# Patient Record
Sex: FEMALE | URBAN - METROPOLITAN AREA
[De-identification: names, ages, dates, MRNs, and addresses within clinical notes are randomized per-mention and may not be internally consistent; named-entity substitution may affect disease eponyms.]

---

## 2018-12-29 PROCEDURE — 84145 PROCALCITONIN (PCT): CPT

## 2018-12-30 ENCOUNTER — HOSPITAL ENCOUNTER (OUTPATIENT)
Age: 83
Discharge: HOME | End: 2019-01-01

## 2018-12-30 LAB — PROCALCITONIN: 0.53 NG/ML (ref 0–0.08)

## 2019-02-28 ENCOUNTER — HOSPITAL ENCOUNTER (OUTPATIENT)
Age: 84
Discharge: HOME | End: 2019-03-02

## 2019-02-28 LAB — PROCALCITONIN: 5.96 NG/ML (ref 0–0.08)

## 2019-02-28 PROCEDURE — 84145 PROCALCITONIN (PCT): CPT

## 2020-03-19 ENCOUNTER — HOSPITAL ENCOUNTER (OUTPATIENT)
Age: 85
Discharge: HOME | End: 2020-03-21

## 2020-03-19 PROCEDURE — 88311 DECALCIFY TISSUE: CPT

## 2020-03-19 PROCEDURE — 88307 TISSUE EXAM BY PATHOLOGIST: CPT

## 2022-04-06 ENCOUNTER — INITIAL CONSULT (OUTPATIENT)
Dept: SURGERY | Age: 87
End: 2022-04-06
Payer: COMMERCIAL

## 2022-04-06 VITALS
WEIGHT: 108 LBS | SYSTOLIC BLOOD PRESSURE: 129 MMHG | DIASTOLIC BLOOD PRESSURE: 84 MMHG | HEART RATE: 73 BPM | BODY MASS INDEX: 19.88 KG/M2 | HEIGHT: 62 IN | TEMPERATURE: 97.3 F

## 2022-04-06 DIAGNOSIS — K80.50 CHOLEDOCHOLITHIASIS: Primary | ICD-10-CM

## 2022-04-06 DIAGNOSIS — K83.8 DILATED CBD, ACQUIRED: ICD-10-CM

## 2022-04-06 PROCEDURE — 99203 OFFICE O/P NEW LOW 30 MIN: CPT | Performed by: SURGERY

## 2022-04-06 RX ORDER — A/C/E/ZINC/SOD SELENATE/COPPER 5000-60-30
1 TABLET ORAL DAILY
COMMUNITY

## 2022-04-06 RX ORDER — ACETAMINOPHEN 160 MG
TABLET,DISINTEGRATING ORAL
COMMUNITY

## 2022-04-06 RX ORDER — FLUTICASONE FUROATE AND VILANTEROL TRIFENATATE 100; 25 UG/1; UG/1
POWDER RESPIRATORY (INHALATION) DAILY
COMMUNITY

## 2022-04-06 RX ORDER — ASPIRIN 81 MG/1
81 TABLET, CHEWABLE ORAL DAILY
COMMUNITY

## 2022-04-06 RX ORDER — SIMVASTATIN 20 MG
20 TABLET ORAL NIGHTLY
COMMUNITY

## 2022-04-06 RX ORDER — CLONIDINE HYDROCHLORIDE 0.1 MG/1
0.1 TABLET ORAL 2 TIMES DAILY
COMMUNITY

## 2022-04-06 RX ORDER — OLMESARTAN MEDOXOMIL / AMLODIPINE BESYLATE / HYDROCHLOROTHIAZIDE 40; 10; 12.5 MG/1; MG/1; MG/1
TABLET, FILM COATED ORAL
COMMUNITY

## 2022-04-06 RX ORDER — POTASSIUM CHLORIDE 20 MEQ/1
20 TABLET, EXTENDED RELEASE ORAL 2 TIMES DAILY
COMMUNITY

## 2022-04-06 RX ORDER — LANSOPRAZOLE 30 MG/1
30 CAPSULE, DELAYED RELEASE ORAL DAILY
COMMUNITY

## 2022-04-08 PROBLEM — K83.8 DILATED CBD, ACQUIRED: Status: ACTIVE | Noted: 2022-04-08

## 2022-04-08 PROBLEM — K80.50 CHOLEDOCHOLITHIASIS: Status: ACTIVE | Noted: 2022-04-08

## 2022-04-08 NOTE — PROGRESS NOTES
General Surgery History and Physical  [facility]    Patient's Name/Date of Birth: Thomas Chandra / 1935    Date: April 8, 2022     Surgeon: Dianna Gandara MD    PCP: Josi Starks MD     Chief Complaint: dilated bile duct, choledocholithiasis    HPI:   Thomas Chandra is a 80 y.o. female who presents for evaluation of choledocholithiasis and dilated bile duct. She had 1 episode of RUQ pain last month. She was found to have normal liver enzymes however CTA abdomen and MRCP were performed and revealed retained CBD stone s/p lap oscar. Patient has been asymptomatic since then. She denies nausea, vomiting, constipation, diarrhea, headache, chest pain, shortness of breath, fevers, chills. There is no problem list on file for this patient. Past Medical History:   Diagnosis Date    Hypertension        History reviewed. No pertinent surgical history. No Known Allergies    The patient has a family history that is negative for severe cardiovascular or respiratory issues, negative for reaction to anesthesia. Time spent reviewing past medical, surgical, social and family history, vitals, nursing assessment and images. No changes from above documented history.     Social History     Socioeconomic History    Marital status: Unknown     Spouse name: Not on file    Number of children: Not on file    Years of education: Not on file    Highest education level: Not on file   Occupational History    Not on file   Tobacco Use    Smoking status: Former Smoker     Types: Cigarettes    Smokeless tobacco: Never Used   Substance and Sexual Activity    Alcohol use: Never    Drug use: Not on file    Sexual activity: Not on file   Other Topics Concern    Not on file   Social History Narrative    Not on file     Social Determinants of Health     Financial Resource Strain:     Difficulty of Paying Living Expenses: Not on file   Food Insecurity:     Worried About [address] in the Last Year: Not on file    Ran Out of Food in the Last Year: Not on file   Transportation Needs:     Lack of Transportation (Medical): Not on file    Lack of Transportation (Non-Medical): Not on file   Physical Activity:     Days of Exercise per Week: Not on file    Minutes of Exercise per Session: Not on file   Stress:     Feeling of Stress : Not on file   Social Connections:     Frequency of Communication with Friends and Family: Not on file    Frequency of Social Gatherings with Friends and Family: Not on file    Attends Yazidism Services: Not on file    Active Member of [address] [place] [address] or Organizations: Not on file    Attends Club or Organization Meetings: Not on file    Marital Status: Not on file   Intimate Partner Violence:     Fear of Current or Ex-Partner: Not on file    Emotionally Abused: Not on file    Physically Abused: Not on file    Sexually Abused: Not on file   Housing Stability:     Unable to Pay for Housing in the Last Year: Not on file    Number of [place] in the Last Year: Not on file    Unstable Housing in the Last Year: Not on file       I have reviewed relevant labs from this admission and interpretation is included in my assessment and plan    Review of Systems    A complete 10 system review was performed and are otherwise negative unless mentioned in the above HPI. Specific negatives are listed below but may not include all those reviewed.     General ROS: negative obtundation, AMS  ENT ROS: negative rhinorrhea, epistaxis  Allergy and Immunology ROS: negative itchy/watery eyes or nasal congestion  Hematological and Lymphatic ROS: negative spontaneous bleeding or bruising  Endocrine ROS: negative  lethargy, mood swings, palpitations or polydipsia/polyuria  Respiratory ROS: negative sputum changes, stridor, tachypnea or wheezing  Cardiovascular ROS: negative for - loss of consciousness, murmur or orthopnea  Gastrointestinal ROS: negative for - hematochezia or hematemesis  Genito-Urinary ROS: negative for -  genital discharge or hematuria  Musculoskeletal ROS: negative for - focal weakness, gangrene  Psych/Neuro ROS: negative for - visual or auditory hallucinations, suicidal ideation    Physical exam:   /84   Pulse 73   Temp 97.3 °F (36.3 °C)   Ht 5' 2\" (1.575 m)   Wt 108 lb (49 kg)   BMI 19.75 kg/m²   General appearance:  NAD, appears stated age  Head: NCAT, PERRLA, EOMI, red conjunctiva  Neck: supple, no masses, trachea midline  Lungs: Equal chest rise bilateral, no retractions, no wheezing  Heart: Reg rate  Abdomen: soft, nontender, nondistended  Skin; warm and dry, no cyanosis  Gu: no cva tenderness  Extremities: atraumatic, no focal motor deficits, no open wounds  Psych: No tremor, visual hallucinations      Radiology: I reviewed relevant abdominal imaging from this admission and that available in the EMR including CT abd/pel and MRI abdomen from 3/11/22. My assessment is choledocholithiasis    Assessment:  Carol Pulse is a 80 y.o. female with dilated CBD post cholecystectomy and choledocholithiasis, non obstructive  There is no problem list on file for this patient.         Plan:  Patient would like to hold off on ERCP at this time since she is asymptomatic and of advanced age  We discussed the signs and symptoms of biliary obstruction and I counseled her to return if she saw any of the signs  Patient and her daughter verbalized understanding  Follow-up with me as needed        Ramin Chavez MD  5:51 AM

## 2022-12-16 ENCOUNTER — OFFICE VISIT (OUTPATIENT)
Dept: SURGERY | Age: 87
End: 2022-12-16
Payer: MEDICARE

## 2022-12-16 VITALS
BODY MASS INDEX: 18.88 KG/M2 | HEART RATE: 100 BPM | SYSTOLIC BLOOD PRESSURE: 118 MMHG | DIASTOLIC BLOOD PRESSURE: 54 MMHG | OXYGEN SATURATION: 92 % | TEMPERATURE: 96.8 F | HEIGHT: 61 IN | WEIGHT: 100 LBS

## 2022-12-16 DIAGNOSIS — C44.629 SQUAMOUS CELL CANCER OF SKIN OF LEFT FOREARM: Primary | ICD-10-CM

## 2022-12-16 PROCEDURE — 99204 OFFICE O/P NEW MOD 45 MIN: CPT | Performed by: PLASTIC SURGERY

## 2022-12-16 PROCEDURE — 1123F ACP DISCUSS/DSCN MKR DOCD: CPT | Performed by: PLASTIC SURGERY

## 2022-12-16 RX ORDER — FUROSEMIDE 20 MG/1
20 TABLET ORAL DAILY
COMMUNITY

## 2022-12-16 RX ORDER — FORMOTEROL FUMARATE 20 UG/2ML
20 SOLUTION RESPIRATORY (INHALATION) 2 TIMES DAILY
COMMUNITY

## 2023-01-09 ENCOUNTER — TELEPHONE (OUTPATIENT)
Dept: SURGERY | Age: 88
End: 2023-01-09

## 2023-01-09 NOTE — TELEPHONE ENCOUNTER
Pt daughter called stating that pt tested positive for Covid on 12/25 and on 1/5 was put on Prednisone. I told daughter that since that was 2+ weeks ago we can still see her for her [facility] excision as long as she feels up to it.

## 2023-01-12 ENCOUNTER — PROCEDURE VISIT (OUTPATIENT)
Dept: SURGERY | Age: 88
End: 2023-01-12
Payer: MEDICARE

## 2023-01-12 DIAGNOSIS — C44.629 SQUAMOUS CELL CANCER OF SKIN OF LEFT FOREARM: Primary | ICD-10-CM

## 2023-01-12 PROCEDURE — 12032 INTMD RPR S/A/T/EXT 2.6-7.5: CPT | Performed by: PLASTIC SURGERY

## 2023-01-12 PROCEDURE — 11402 EXC TR-EXT B9+MARG 1.1-2 CM: CPT | Performed by: PLASTIC SURGERY

## 2023-01-12 RX ORDER — LIDOCAINE HYDROCHLORIDE AND EPINEPHRINE 10; 10 MG/ML; UG/ML
3 INJECTION, SOLUTION INFILTRATION; PERINEURAL ONCE
Status: COMPLETED | OUTPATIENT
Start: 2023-01-12 | End: 2023-01-12

## 2023-01-12 RX ADMIN — LIDOCAINE HYDROCHLORIDE AND EPINEPHRINE 3 ML: 10; 10 INJECTION, SOLUTION INFILTRATION; PERINEURAL at 12:43

## 2023-01-12 NOTE — PROGRESS NOTES
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 Denies fever, nausea, vomiting, leg pain or swelling. The patient voices understanding of the procedure they are having today and would like to proceed. Objective: There were no vitals taken for this visit. Squamous Cell Cancer of left forearm-  15mm x 15mm,  pink in color, irregular border, notraised, no signs of bleeding,drainage or infection. Non tender to palpation  Lesion- 15 mm x 15mm  Margin- 4 mm  Defect- 20 mm x 20 mm  Scar- 40 mm         Assessment:    Patient Active Problem List   Diagnosis    Choledocholithiasis    Dilated cbd, acquired       Plan:       Diagnosis  -) Squamous cell carcinoma of the left forearm      -The risks, benefits and options were discussed with the pt. The risks included but not limited to pain, bleeding, infection, heavy scarring, damage to surrounding structures, fluid collections, asymmetry, and need for further procedures. All of Her questions were answered to their satisfaction and She agrees to proceed with the procedure.    -After consent was obtained, the area was cleansed with an alcohol swab. Local anesthesia consisting of 1% lidocaine with 1:100,000 epinepherine was injected  surrounding the area. The local was allowed to work. Using a 10-blade the lesion was excised with 4 mm margins,  repair was performed. The wound(s) were closed with 4-0 monocryl for the deep dermal layer in a simple interrupted fashion and 3-0 prolene was used to close the skin via horizontal mattress sutures. Hemostasis was obtained and a bacitracin and gauze dressing was placed over the wound. The procedure was performed by Dr Jose De Jesus Herman and Hao Ellison, PGY-3. Please schedule an appointment to be seen on Follow-up with our office in 7-14 days  Diet: regular diet  Activity: no heavy lifting for 7 days. Shower/Bathing: OK to shower over the wound site in 48 hours.   No baths, hot tubs or soaking of the wound site at this time. Pt voices understanding. Wound care:   Bacitracin will need to be placed over the wound site twice daily and covered with a gauze pad. The gauze pad can be changed as needed for saturation    Medications: As prescribed  Educated to contact physician with concerns or signs of infection (redness, increasing pain, discharge, odor, fever). The entirety of the procedure was performed by Dr. Salvatore Barnes with first assistance by Allan Bautista, PGY-3    Please note that the medical decision making for the patient as well as the subjective, objective, assessment and plan were reviewed and performed by Dr Salvatore Barnes    Electronically signed by Dianne Velazco DO on 1/12/2023 at 11:39 AM      Attending Physician Statement  I have discussed the case, including pertinent history and exam findings with the resident. I have seen and examined the patient and the key elements of all parts of the encounter have been performed by me. I agree with the assessment, exam, plan and orders as documented by the resident. I attest that the patient was seen and examined by me, and concur with the documentation above. I agree with the assessment and the plan outlined. This document is generated, in part, by voice recognition software and thus  syntax and grammatical errors are possible.     Shanti Aponte

## 2023-01-25 ENCOUNTER — OFFICE VISIT (OUTPATIENT)
Dept: SURGERY | Age: 88
End: 2023-01-25

## 2023-01-25 VITALS — TEMPERATURE: 97.1 F | OXYGEN SATURATION: 88 % | HEART RATE: 78 BPM

## 2023-01-25 DIAGNOSIS — C44.629 SQUAMOUS CELL CANCER OF SKIN OF LEFT FOREARM: Primary | ICD-10-CM

## 2023-01-25 PROCEDURE — 99024 POSTOP FOLLOW-UP VISIT: CPT | Performed by: PHYSICIAN ASSISTANT

## 2023-01-25 NOTE — PROGRESS NOTES
Subjective: Follow up today from vision of left forearm squamous cell carcinoma. Denies fever, nausea, vomiting, leg pain or swelling, pain is absent. The pt states that they have  kept the wound site(s) covered with bacitracin. The patient states that they have  finished their antibiotic. They state that their pain is absent. Objective: There were no vitals taken for this visit. Left forearm wound: Clean, dry, and intact, no drainage. No signs of infection, wound healing well  Sutures intact  Neuro- Sensation  intact to carol incisional site with light touch .      Assessment:    Patient Active Problem List   Diagnosis    Choledocholithiasis    Dilated cbd, acquired       Labs: CBC: No results found for: WBC, RBC, HGB, HCT, MCV, MCH, MCHC, RDW, PLT, MPV  BMP:  No results found for: NA, K, CL, CO2, BUN, LABALBU, CREATININE, CALCIUM, GFRAA, LABGLOM, GLUCOSE, GLU      Pathology Report- [address] [facility] [address]      [address], [address] [place] [address]               FINAL SURGICAL PATHOLOGY REPORT     NAME:            Gopal Sharma           Date of       01/12/2023                                            Collection:   Medical Record   [id number]              Date of       01/13/2023   Number:                                  Receipt:   Age:  80 Y        Sex:  F                Date          01/19/2023 10:40                                            Reported:   YOB: 1935   Financial        [id number]            Admitting     JOHNNY GUEVARA   Number:                                  Physician:   Patient          Henry Whittington Steven GUEVARA   Location:                                Physician:     Accession Number:  [id number]                                          Additional Physicians:ANGEL STEWART       Diagnosis:   Skin, left forearm: Cicatrix and adjacent actinic keratosis and   incidental solar lentigo. Negative for invasive or in situ carcinoma                                               Jean Olivares M.D.                                        (Electronic Signature)     Plan:     Educated the pt on their pathology report. Pt voices understanding      Dressing -discontinue bacitracin, sutures removed today. Showering at this time -ok    No Restrictions    Pt was instructed on scar massage  Scar Care Instructions      Sunscreen Recommendations for Scars  We recommend that all patients use a sunscreen when outside but especially on new scars (that are exposed to sunlight) for a year after their procedure. The SPF should be at least 28. Follow the application directions on the bottle. Why is it so Important to Use Sunscreen on Scars? A scar is new and more fragile than the surrounding skin. If you do not use sunscreen, the scar line will react differently to the sun than the surrounding skin. If you don't use sunscreen, the scar tissue will become darker than surrounding skin. This is a hyper-pigmented scar and will remain darker than the other skin. After one year, the scar and surrounding skin should react equally to sun. Superficial Scar Massage  Scar massage desensitizes and reduces scar adhesions so skin glides freely. Rub in a circular motion on and around the scar with firm, even pressure for 5 minutes four times per day   You can start scar massage once incision is completely healed and strong enough to handle the motion (usually 10 - 14 days post operatively). You use lotion to do the scar massage to allow ease with motion over the scar and prevent friction at the area. Patient had no further questions. F/U PRN  Call office with concerns or signs of infection.     Judd Garcia   10:49 AM  1/25/2023

## 2023-07-14 ENCOUNTER — TELEPHONE (OUTPATIENT)
Dept: ADMINISTRATIVE | Age: 88
End: 2023-07-14

## 2023-07-21 ENCOUNTER — OFFICE VISIT (OUTPATIENT)
Dept: CARDIOLOGY CLINIC | Age: 88
End: 2023-07-21
Payer: MEDICARE

## 2023-07-21 VITALS
HEART RATE: 80 BPM | WEIGHT: 104.2 LBS | DIASTOLIC BLOOD PRESSURE: 58 MMHG | SYSTOLIC BLOOD PRESSURE: 138 MMHG | HEIGHT: 60 IN | RESPIRATION RATE: 16 BRPM | OXYGEN SATURATION: 96 % | BODY MASS INDEX: 20.46 KG/M2

## 2023-07-21 DIAGNOSIS — R01.1 HEART MURMUR: Primary | ICD-10-CM

## 2023-07-21 PROCEDURE — 1123F ACP DISCUSS/DSCN MKR DOCD: CPT | Performed by: INTERNAL MEDICINE

## 2023-07-21 PROCEDURE — 93000 ELECTROCARDIOGRAM COMPLETE: CPT | Performed by: INTERNAL MEDICINE

## 2023-07-21 PROCEDURE — 99203 OFFICE O/P NEW LOW 30 MIN: CPT | Performed by: INTERNAL MEDICINE

## 2023-07-21 RX ORDER — ACETAMINOPHEN AND CODEINE PHOSPHATE 300; 30 MG/1; MG/1
1 TABLET ORAL PRN
COMMUNITY
Start: 2023-07-20

## 2023-07-21 RX ORDER — DILTIAZEM HYDROCHLORIDE 60 MG/1
2 TABLET, FILM COATED ORAL 2 TIMES DAILY
COMMUNITY
Start: 2023-07-05

## 2023-07-21 ASSESSMENT — ENCOUNTER SYMPTOMS
NAUSEA: 0
DIARRHEA: 0
SHORTNESS OF BREATH: 0
WHEEZING: 0
BACK PAIN: 0
ABDOMINAL PAIN: 0
CONSTIPATION: 0
COUGH: 0
BLOOD IN STOOL: 0
VOMITING: 0

## 2023-07-21 NOTE — PROGRESS NOTES
OUTPATIENT CARDIOLOGY CONSULT    Name: Sean De    Age: 80 y.o. Date of Service: 7/21/2023    Reason for Consultation:   Chief Complaint   Patient presents with    Heart Murmur     Consult, per Dr Emily Philippe, d/t heart murmur. No cardiac complaints. Referring Physician: Abi Darby MD    History of Present Illness:  80-year-old ex-smoker female who is referred for cardiac evaluation due to heart murmur. Patient is accompanied by her daughter and her granddaughter who works in our office nuclear stress lab. 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 Patient lives alone. She takes care of her house. She can go up 12 steps once or twice a day with no chest discomfort or dyspnea on exertion. She denies palpitations, dizziness or syncope. She denies lower extremity edema. EKG done today reviewed sinus rhythm at 80 bpm, left atrial enlargement, left anterior fascicular block, low voltage frontal leads, poor R wave progression and artifacts. Review of Systems:  Review of Systems   Constitutional:  Negative for chills, fatigue and fever. HENT:  Negative for nosebleeds. Respiratory:  Negative for cough, shortness of breath and wheezing. Gastrointestinal:  Negative for abdominal pain, blood in stool, constipation, diarrhea, nausea and vomiting. GERD. Genitourinary:  Negative for dysuria and hematuria. Musculoskeletal:  Negative for back pain, joint swelling and myalgias. Aching attributed to arthritis. Neurological:  Negative for syncope and light-headedness. Psychiatric/Behavioral:  The patient is not nervous/anxious.          Past Medical History:  Past Medical History:   Diagnosis Date    Asthma     COVID 12/2022    Hearing aid worn     Hyperlipidemia     Hypertension        Past Surgical History:  Past Surgical History:

## 2023-07-28 ENCOUNTER — TELEPHONE (OUTPATIENT)
Dept: CARDIOLOGY CLINIC | Age: 88
End: 2023-07-28

## 2023-07-28 RX ORDER — CARVEDILOL 3.12 MG/1
3.12 TABLET ORAL 2 TIMES DAILY WITH MEALS
COMMUNITY
End: 2023-07-28 | Stop reason: SDUPTHER

## 2023-07-28 RX ORDER — CARVEDILOL 3.12 MG/1
3.12 TABLET ORAL 2 TIMES DAILY WITH MEALS
Qty: 180 TABLET | Refills: 3 | Status: SHIPPED | OUTPATIENT
Start: 2023-07-28

## 2023-07-28 NOTE — TELEPHONE ENCOUNTER
Patient's daughter called with patient's BP results since office visit. She says they have been quite higher than usual. She states she has taken the Clonidine but still elevated.  Some readings as follows  179/97   171/84   149/71   158/87   Please advise

## 2023-07-30 ENCOUNTER — HOSPITAL ENCOUNTER (EMERGENCY)
Age: 88
Discharge: HOME | End: 2023-07-31
Attending: STUDENT IN AN ORGANIZED HEALTH CARE EDUCATION/TRAINING PROGRAM
Payer: MEDICARE

## 2023-07-30 DIAGNOSIS — K80.50 CALCULUS OF BILE DUCT WITHOUT CHOLECYSTITIS AND WITHOUT OBSTRUCTION: ICD-10-CM

## 2023-07-30 DIAGNOSIS — R03.0 ELEVATED BLOOD PRESSURE READING: Primary | ICD-10-CM

## 2023-07-30 DIAGNOSIS — J43.9 PULMONARY EMPHYSEMA, UNSPECIFIED EMPHYSEMA TYPE (HCC): ICD-10-CM

## 2023-07-30 DIAGNOSIS — Z99.81 OXYGEN DEPENDENT: ICD-10-CM

## 2023-07-30 LAB
ALBUMIN SERPL-MCNC: 4.4 G/DL (ref 3.5–5.2)
ALP SERPL-CCNC: 168 U/L (ref 35–104)
ALT SERPL-CCNC: 21 U/L (ref 0–32)
ANION GAP SERPL CALCULATED.3IONS-SCNC: 13 MMOL/L (ref 7–16)
AST SERPL-CCNC: 28 U/L (ref 0–31)
BASOPHILS # BLD: 0.03 K/UL (ref 0–0.2)
BASOPHILS NFR BLD: 0 % (ref 0–2)
BILIRUB SERPL-MCNC: 0.4 MG/DL (ref 0–1.2)
BUN SERPL-MCNC: 18 MG/DL (ref 6–23)
CALCIUM SERPL-MCNC: 9.5 MG/DL (ref 8.6–10.2)
CHLORIDE SERPL-SCNC: 99 MMOL/L (ref 98–107)
CO2 SERPL-SCNC: 30 MMOL/L (ref 22–29)
CREAT SERPL-MCNC: 0.8 MG/DL (ref 0.5–1)
EOSINOPHIL # BLD: 0.19 K/UL (ref 0.05–0.5)
EOSINOPHILS RELATIVE PERCENT: 2 % (ref 0–6)
ERYTHROCYTE [DISTWIDTH] IN BLOOD BY AUTOMATED COUNT: 13.8 % (ref 11.5–15)
GFR SERPL CREATININE-BSD FRML MDRD: >60 ML/MIN/1.73M2
GLUCOSE SERPL-MCNC: 119 MG/DL (ref 74–99)
HCT VFR BLD AUTO: 47 % (ref 34–48)
HGB BLD-MCNC: 15.4 G/DL (ref 11.5–15.5)
IMM GRANULOCYTES # BLD AUTO: <0.03 K/UL (ref 0–0.58)
IMM GRANULOCYTES NFR BLD: 0 % (ref 0–5)
LYMPHOCYTES NFR BLD: 1.73 K/UL (ref 1.5–4)
LYMPHOCYTES RELATIVE PERCENT: 22 % (ref 20–42)
MCH RBC QN AUTO: 30.7 PG (ref 26–35)
MCHC RBC AUTO-ENTMCNC: 32.8 G/DL (ref 32–34.5)
MCV RBC AUTO: 93.6 FL (ref 80–99.9)
MONOCYTES NFR BLD: 0.72 K/UL (ref 0.1–0.95)
MONOCYTES NFR BLD: 9 % (ref 2–12)
NEUTROPHILS NFR BLD: 66 % (ref 43–80)
NEUTS SEG NFR BLD: 5.11 K/UL (ref 1.8–7.3)
PLATELET # BLD AUTO: 187 K/UL (ref 130–450)
PMV BLD AUTO: 9.9 FL (ref 7–12)
POTASSIUM SERPL-SCNC: 4 MMOL/L (ref 3.5–5)
PROT SERPL-MCNC: 7.2 G/DL (ref 6.4–8.3)
RBC # BLD AUTO: 5.02 M/UL (ref 3.5–5.5)
SODIUM SERPL-SCNC: 142 MMOL/L (ref 132–146)
TROPONIN I SERPL HS-MCNC: 13 NG/L (ref 0–9)
WBC OTHER # BLD: 7.8 K/UL (ref 4.5–11.5)

## 2023-07-30 PROCEDURE — 85027 COMPLETE CBC AUTOMATED: CPT

## 2023-07-30 PROCEDURE — 83880 ASSAY OF NATRIURETIC PEPTIDE: CPT

## 2023-07-30 PROCEDURE — 93005 ELECTROCARDIOGRAM TRACING: CPT | Performed by: STUDENT IN AN ORGANIZED HEALTH CARE EDUCATION/TRAINING PROGRAM

## 2023-07-30 PROCEDURE — 84484 ASSAY OF TROPONIN QUANT: CPT

## 2023-07-30 PROCEDURE — 80053 COMPREHEN METABOLIC PANEL: CPT

## 2023-07-30 PROCEDURE — 87636 SARSCOV2 & INF A&B AMP PRB: CPT

## 2023-07-30 PROCEDURE — 99285 EMERGENCY DEPT VISIT HI MDM: CPT

## 2023-07-30 ASSESSMENT — ENCOUNTER SYMPTOMS
NAUSEA: 0
COUGH: 0
DIARRHEA: 0
VOMITING: 0
BACK PAIN: 0
COLOR CHANGE: 0
ABDOMINAL PAIN: 0
SHORTNESS OF BREATH: 0

## 2023-07-30 ASSESSMENT — LIFESTYLE VARIABLES
HOW OFTEN DO YOU HAVE A DRINK CONTAINING ALCOHOL: NEVER
HOW MANY STANDARD DRINKS CONTAINING ALCOHOL DO YOU HAVE ON A TYPICAL DAY: PATIENT DOES NOT DRINK

## 2023-07-31 ENCOUNTER — APPOINTMENT (OUTPATIENT)
Dept: CT IMAGING | Age: 88
End: 2023-07-31
Payer: MEDICARE

## 2023-07-31 ENCOUNTER — APPOINTMENT (OUTPATIENT)
Dept: GENERAL RADIOLOGY | Age: 88
End: 2023-07-31
Payer: MEDICARE

## 2023-07-31 VITALS
TEMPERATURE: 98.6 F | RESPIRATION RATE: 16 BRPM | SYSTOLIC BLOOD PRESSURE: 172 MMHG | OXYGEN SATURATION: 93 % | DIASTOLIC BLOOD PRESSURE: 94 MMHG | HEART RATE: 91 BPM

## 2023-07-31 LAB
BNP SERPL-MCNC: 1021 PG/ML (ref 0–450)
EKG ATRIAL RATE: 103 BPM
EKG ATRIAL RATE: 93 BPM
EKG P AXIS: 57 DEGREES
EKG P AXIS: 67 DEGREES
EKG P-R INTERVAL: 186 MS
EKG P-R INTERVAL: 186 MS
EKG Q-T INTERVAL: 318 MS
EKG Q-T INTERVAL: 354 MS
EKG QRS DURATION: 66 MS
EKG QRS DURATION: 68 MS
EKG QTC CALCULATION (BAZETT): 416 MS
EKG QTC CALCULATION (BAZETT): 440 MS
EKG R AXIS: -33 DEGREES
EKG R AXIS: -38 DEGREES
EKG T AXIS: 51 DEGREES
EKG T AXIS: 59 DEGREES
EKG VENTRICULAR RATE: 103 BPM
EKG VENTRICULAR RATE: 93 BPM
FLUAV RNA RESP QL NAA+PROBE: NOT DETECTED
FLUBV RNA RESP QL NAA+PROBE: NOT DETECTED
SARS-COV-2 RNA RESP QL NAA+PROBE: NOT DETECTED
SOURCE: NORMAL
SPECIMEN DESCRIPTION: NORMAL
TROPONIN I SERPL HS-MCNC: 12 NG/L (ref 0–9)

## 2023-07-31 PROCEDURE — 71045 X-RAY EXAM CHEST 1 VIEW: CPT

## 2023-07-31 PROCEDURE — 84484 ASSAY OF TROPONIN QUANT: CPT

## 2023-07-31 PROCEDURE — 70450 CT HEAD/BRAIN W/O DYE: CPT

## 2023-07-31 PROCEDURE — 71275 CT ANGIOGRAPHY CHEST: CPT

## 2023-07-31 PROCEDURE — 6360000004 HC RX CONTRAST MEDICATION: Performed by: RADIOLOGY

## 2023-07-31 PROCEDURE — 93010 ELECTROCARDIOGRAM REPORT: CPT | Performed by: INTERNAL MEDICINE

## 2023-07-31 RX ADMIN — IOPAMIDOL 75 ML: 755 INJECTION, SOLUTION INTRAVENOUS at 02:02

## 2023-07-31 ASSESSMENT — PAIN - FUNCTIONAL ASSESSMENT: PAIN_FUNCTIONAL_ASSESSMENT: NONE - DENIES PAIN

## 2023-07-31 NOTE — DISCHARGE INSTRUCTIONS
Use your oxygen as needed. Continue taking all medications as prescribed. If anything changes if you develop chest pain shortness of breath fevers please return back to emergency department.

## 2023-07-31 NOTE — ED NOTES
Patient ambulated to restroom. Upon returning to room, SpO2 77% on RA  & patient c/o SOB . Per patient she had no SOB prior to this. Patient placed on 2 lpm NC, SpO2 increased to 98% DAYNE Schreiber notified.       Padmini [facility]  07/30/23 3246

## 2023-07-31 NOTE — ED PROVIDER NOTES
HPI   63-year-old female patient presents to emergency department for evaluation of elevated blood pressure. Patient on multiple antihypertensive medications including Coreg, olmesartan, atenolol and clonidine as needed. Patient has been compliant taking all medications as scheduled. She was recently started seeing cardiology due to history of aortic stenosis. Over the last 3 days blood pressure has been very high in the 170s and 180s. No chest pain or shortness of breath but she has been having intermittent headaches behind her eyes they are coming and going. Headache restarted again once she had arrived. Not sudden onset, not maximal intensity at time of onset. It is currently mild no associated blurred vision no numbness or weakness no slurred speech. No chest pain or shortness of breath. Review of Systems   Constitutional:  Negative for chills and fever. HENT:  Negative for congestion. Respiratory:  Negative for cough and shortness of breath. Cardiovascular:  Negative for chest pain. Gastrointestinal:  Negative for abdominal pain, diarrhea, nausea and vomiting. Genitourinary:  Negative for difficulty urinating, dysuria and hematuria. Musculoskeletal:  Negative for back pain. Skin:  Negative for color change. Neurological:  Positive for headaches. All other systems reviewed and are negative. Physical Exam  Vitals and nursing note reviewed. Constitutional:       Appearance: Normal appearance. HENT:      Head: Normocephalic and atraumatic. Nose: Nose normal. No congestion. Mouth/Throat:      Mouth: Mucous membranes are moist.      Pharynx: Oropharynx is clear. Eyes:      Conjunctiva/sclera: Conjunctivae normal.      Pupils: Pupils are equal, round, and reactive to light. Cardiovascular:      Rate and Rhythm: Normal rate and regular rhythm. Pulses: Normal pulses. Heart sounds: Murmur heard.    Pulmonary:      Effort: Pulmonary effort is normal. No

## 2023-08-16 ENCOUNTER — HOSPITAL ENCOUNTER (OUTPATIENT)
Dept: CARDIOLOGY | Age: 88
Discharge: HOME | End: 2023-08-16
Attending: INTERNAL MEDICINE
Payer: MEDICARE

## 2023-08-16 DIAGNOSIS — R01.1 HEART MURMUR: ICD-10-CM

## 2023-08-16 LAB
LV EF: 70 %
LVEF MODALITY: NORMAL

## 2023-08-16 PROCEDURE — 93306 TTE W/DOPPLER COMPLETE: CPT

## 2023-08-22 ENCOUNTER — TELEPHONE (OUTPATIENT)
Dept: CARDIOLOGY CLINIC | Age: 88
End: 2023-08-22

## 2023-08-22 NOTE — TELEPHONE ENCOUNTER
Please inform patient that her echocardiogram revealed significant amount of calcium on the mitral valve with mild mitral valve stenosis and mild aortic valve stenosis with significant leaking of the tricuspid valve but an ejection fraction of more than 70%. The stenosis of the mitral and aortic valve at her age is not uncommon.

## 2023-08-30 RX ORDER — CARVEDILOL 3.12 MG/1
6.25 TABLET ORAL 2 TIMES DAILY WITH MEALS
Qty: 180 TABLET | Refills: 3 | Status: CANCELLED | OUTPATIENT
Start: 2023-08-30

## 2023-08-31 RX ORDER — CARVEDILOL 6.25 MG/1
6.25 TABLET ORAL 2 TIMES DAILY
Qty: 180 TABLET | Refills: 3 | Status: SHIPPED | OUTPATIENT
Start: 2023-08-31

## 2024-04-18 ENCOUNTER — OFFICE VISIT (OUTPATIENT)
Dept: CARDIOLOGY CLINIC | Age: 89
End: 2024-04-18
Payer: MEDICARE

## 2024-04-18 VITALS
BODY MASS INDEX: 18.02 KG/M2 | HEIGHT: 60 IN | DIASTOLIC BLOOD PRESSURE: 62 MMHG | HEART RATE: 84 BPM | SYSTOLIC BLOOD PRESSURE: 116 MMHG | RESPIRATION RATE: 16 BRPM | WEIGHT: 91.8 LBS

## 2024-04-18 DIAGNOSIS — R01.1 HEART MURMUR: Primary | ICD-10-CM

## 2024-04-18 PROCEDURE — 93000 ELECTROCARDIOGRAM COMPLETE: CPT | Performed by: INTERNAL MEDICINE

## 2024-04-18 PROCEDURE — 99214 OFFICE O/P EST MOD 30 MIN: CPT | Performed by: INTERNAL MEDICINE

## 2024-04-18 PROCEDURE — 1123F ACP DISCUSS/DSCN MKR DOCD: CPT | Performed by: INTERNAL MEDICINE

## 2024-04-18 RX ORDER — LOSARTAN POTASSIUM 25 MG/1
25 TABLET ORAL DAILY
COMMUNITY

## 2024-04-18 RX ORDER — APIXABAN 2.5 MG/1
2.5 TABLET, FILM COATED ORAL 2 TIMES DAILY
COMMUNITY
Start: 2024-03-29

## 2024-04-18 ASSESSMENT — ENCOUNTER SYMPTOMS
ABDOMINAL PAIN: 0
BACK PAIN: 0
SHORTNESS OF BREATH: 0
BLOOD IN STOOL: 0
DIARRHEA: 0
COUGH: 0
WHEEZING: 0
NAUSEA: 0
CONSTIPATION: 0
VOMITING: 0

## 2024-04-18 NOTE — PROGRESS NOTES
the lungs in the morning and 2 puffs in the evening.      acetaminophen-codeine (TYLENOL #3) 300-30 MG per tablet Take 1 tablet by mouth as needed.      furosemide (LASIX) 20 MG tablet Take 1 tablet by mouth daily      formoterol (PERFOROMIST) 20 MCG/2ML nebulizer solution Take 20 mcg by nebulization in the morning and at bedtime (Patient not taking: Reported on 7/21/2023)      Acetaminophen (TYLENOL) 325 MG CAPS Take 2 tablets by mouth daily      simvastatin (ZOCOR) 20 MG tablet Take 1 tablet by mouth nightly      cloNIDine (CATAPRES) 0.1 MG tablet Take 1 tablet by mouth daily as needed      fluticasone-vilanterol (BREO ELLIPTA) 100-25 MCG/INH AEPB inhaler Inhale into the lungs daily (Patient not taking: Reported on 12/16/2022)      Cholecalciferol (VITAMIN D3) 50 MCG (2000 UT) CAPS Take by mouth daily      Multiple Vitamins-Minerals (PROSIGHT) TABS Take 1 tablet by mouth daily      Probiotic Product (PROBIOTIC-10 PO) Take by mouth daily      olmesartan-amLODIPine-HCTZ 40-10-12.5 MG TABS Take 20 mg by mouth daily      albuterol (PROVENTIL) (2.5 MG/3ML) 0.083% nebulizer solution Take 3 mLs by nebulization in the morning and at bedtime      atenolol (TENORMIN) 25 MG tablet Take 1 tablet by mouth daily       No current facility-administered medications for this visit.       Physical Exam:  Ht 1.524 m (5')   BMI 20.35 kg/m²   Wt Readings from Last 3 Encounters:   07/21/23 47.3 kg (104 lb 3.2 oz)   12/16/22 45.4 kg (100 lb)   04/06/22 49 kg (108 lb)       Physical Exam  Constitutional:       General: She is not in acute distress.     Appearance: She is well-developed.   HENT:      Head: Normocephalic and atraumatic.   Neck:      Vascular: No carotid bruit or JVD.   Cardiovascular:      Rate and Rhythm: Normal rate and regular rhythm.      Heart sounds: No murmur heard.     No friction rub. No gallop.      Comments: 2/6 systolic murmur heard all over the precordial area.  Pulmonary:      Breath sounds: No wheezing or

## 2024-05-06 ENCOUNTER — TELEPHONE (OUTPATIENT)
Dept: CARDIOLOGY CLINIC | Age: 89
End: 2024-05-06

## 2024-05-06 NOTE — TELEPHONE ENCOUNTER
Please inform patient daughter to increase her Lasix to 40 mg for few days then cut down back to 20 mg.  Patient is to have low-salt diet and leg elevation.  She was already referred to see Dr. Chavira due to her permanent pacemaker.

## 2024-05-06 NOTE — TELEPHONE ENCOUNTER
Dr. Tripathi,    Patients daughter is calling because swelling mid calf area down into her feet, she is also experiencing extreme hair loss?   Patient is also inquiring about a referral for Dr. Chavira?     Please Advise.

## 2024-05-13 ENCOUNTER — TELEPHONE (OUTPATIENT)
Dept: OTHER | Age: 89
End: 2024-05-13

## 2024-05-13 ENCOUNTER — TELEPHONE (OUTPATIENT)
Dept: CARDIOLOGY CLINIC | Age: 89
End: 2024-05-13

## 2024-05-13 DIAGNOSIS — R60.0 LOWER EXTREMITY EDEMA: Primary | ICD-10-CM

## 2024-05-13 RX ORDER — POTASSIUM CHLORIDE 20 MEQ/1
20 TABLET, EXTENDED RELEASE ORAL DAILY
Qty: 30 TABLET | Refills: 5 | Status: SHIPPED | OUTPATIENT
Start: 2024-05-13

## 2024-05-13 NOTE — TELEPHONE ENCOUNTER
Received consult from 's office. Called and spoke with patient's daughter Luzmaria to schedule consult appointment. Patient scheduled for May 29th and notified Kierra Rivera of patient's scheduled appointment and asked Kierra to please make sure  is ok with appointment since it is a few weeks away.

## 2024-05-13 NOTE — TELEPHONE ENCOUNTER
Please inform patient daughter that the lower extremity edema is probably due to severe leaking of the tricuspid valve and moderate elevation of the patient to her lungs.  I increase her Lasix to 40 mg twice daily for the next few days then cut down to 40 mg daily  I will send to her pharmacy potassium supplementation to take with Lasix  She will need to have her kidney function and electrolytes checked in 3 days.  I will arrange for her to be followed at the congestive heart failure clinic in a week.

## 2024-05-13 NOTE — TELEPHONE ENCOUNTER
Dr. Tripathi,    Patients daughter called about her mothers' legs still swelling. The left leg seems to swell more than the right leg, up to mid calf and leg looks shiny because of how swollen it gets. For the past week, she has been taking the 40mg of lasix and also elevated her feet as much as possible.    Please Advise.

## 2024-05-16 ENCOUNTER — HOSPITAL ENCOUNTER (OUTPATIENT)
Age: 89
Discharge: HOME | End: 2024-05-16
Payer: MEDICARE

## 2024-05-16 DIAGNOSIS — R60.0 LOWER EXTREMITY EDEMA: ICD-10-CM

## 2024-05-16 LAB
ANION GAP SERPL CALCULATED.3IONS-SCNC: 11 MMOL/L (ref 7–16)
BNP SERPL-MCNC: 482 PG/ML (ref 0–450)
BUN SERPL-MCNC: 17 MG/DL (ref 6–23)
CALCIUM SERPL-MCNC: 9.4 MG/DL (ref 8.6–10.2)
CHLORIDE SERPL-SCNC: 97 MMOL/L (ref 98–107)
CO2 SERPL-SCNC: 33 MMOL/L (ref 22–29)
CREAT SERPL-MCNC: 0.7 MG/DL (ref 0.5–1)
GFR, ESTIMATED: 77 ML/MIN/1.73M2
GLUCOSE SERPL-MCNC: 82 MG/DL (ref 74–99)
POTASSIUM SERPL-SCNC: 4 MMOL/L (ref 3.5–5)
SODIUM SERPL-SCNC: 141 MMOL/L (ref 132–146)

## 2024-05-16 PROCEDURE — 83880 ASSAY OF NATRIURETIC PEPTIDE: CPT

## 2024-05-16 PROCEDURE — 36415 COLL VENOUS BLD VENIPUNCTURE: CPT

## 2024-05-16 PROCEDURE — 80048 BASIC METABOLIC PNL TOTAL CA: CPT

## 2024-05-24 ENCOUNTER — TELEPHONE (OUTPATIENT)
Dept: CARDIOLOGY CLINIC | Age: 89
End: 2024-05-24

## 2024-05-24 NOTE — TELEPHONE ENCOUNTER
PER JASON I ASKED DR HOFFMAN IF [facility] LABS WERE OK AND IF SHE STILL NEEDED TO TAKE THE POTASSIUM.    PER DR HOFFMAN NO ISSUES WITH THE LAB AND YES SHE SHOULD REMAIN ON THE POTASSIUM.    I CALLED JASON BACK AND GAVE HER THE ABOVE INFORMATION.  RLL

## 2024-05-28 RX ORDER — APIXABAN 2.5 MG/1
2.5 TABLET, FILM COATED ORAL 2 TIMES DAILY
Qty: 60 TABLET | Refills: 2 | Status: SHIPPED | OUTPATIENT
Start: 2024-05-28

## 2024-05-30 ENCOUNTER — HOSPITAL ENCOUNTER (EMERGENCY)
Age: 89
Discharge: HOME | End: 2024-05-30
Payer: MEDICARE

## 2024-05-30 VITALS
TEMPERATURE: 97.5 F | HEART RATE: 66 BPM | SYSTOLIC BLOOD PRESSURE: 129 MMHG | OXYGEN SATURATION: 99 % | RESPIRATION RATE: 18 BRPM | DIASTOLIC BLOOD PRESSURE: 52 MMHG

## 2024-05-30 DIAGNOSIS — Z51.89 ENCOUNTER FOR POST-TRAUMATIC WOUND CHECK: Primary | ICD-10-CM

## 2024-05-30 PROCEDURE — 99284 EMERGENCY DEPT VISIT MOD MDM: CPT

## 2024-05-30 RX ORDER — DOXYCYCLINE HYCLATE 100 MG
100 TABLET ORAL 2 TIMES DAILY
Qty: 20 TABLET | Refills: 0 | Status: SHIPPED | OUTPATIENT
Start: 2024-05-30 | End: 2024-06-09

## 2024-05-30 ASSESSMENT — PAIN SCALES - GENERAL: PAINLEVEL_OUTOF10: 0

## 2024-05-30 ASSESSMENT — PAIN - FUNCTIONAL ASSESSMENT: PAIN_FUNCTIONAL_ASSESSMENT: NONE - DENIES PAIN

## 2024-05-30 NOTE — ED PROVIDER NOTES
Normal.        ED Triage Vitals [05/30/24 1056]   BP Temp Temp Source Pulse Respirations SpO2 Height Weight   (!) 129/52 97.5 °F (36.4 °C) Oral 74 18 (!) 89 % -- --         Constitutional:  Alert, development consistent with age.  HEENT:  NC/NT.  Airway patent.  Neck:  Normal ROM.  Supple.  Physical Exam  Right Lower Extremity(s): anterior lower leg              Tenderness:  mild.               Swelling: None.        Calf:  No evidence of DVT seen on physical exam..             Deformity: no deformity observed/palpated.               ROM: full range of motion.               Skin:  see wound photo below.     Neurovascular:               Motor deficit: none.               Sensory deficit: none.                Pulse deficit: none.               Capillary refill: normal.  Gait:  normal.  Lymphatics: No lymphangitis or adenopathy noted.  Neurological:  Oriented x3.  Motor functions intact.    Lab / Imaging Results   (All laboratory and radiology results have been personally reviewed by myself)  Labs:  No results found for this visit on 05/30/24.  Imaging:  All Radiology results interpreted by Radiologist unless otherwise noted.  No orders to display     ED Course / Medical Decision Making   Medications - No data to display       MDM:   Patient is well-appearing, afebrile presents to ED today for a wound check.  Approximately 1 week ago she was scratched by a dog.  Exam she has a large skin tear with what appears to be a hematoma that has spontaneously drained there is no surrounding erythema or purulent drainage.  She has no lymphatic streaking no underlying bony tenderness.  Therefore we will start doxycycline for infection prophylaxis and patient was referred to the wound care center as this likely needs debridement.  Patient and daughter advised on home wound care outpatient follow-up as well as return precautions, verbalized understanding and agreeable with plan of care.    Plan of Care/Counseling:  Kerrie Underwood

## 2024-06-03 ENCOUNTER — HOSPITAL ENCOUNTER (INPATIENT)
Age: 89
LOS: 8 days | Discharge: NURSING, REHAB OR LONG-TERM CARE | DRG: 854 | End: 2024-06-11
Attending: EMERGENCY MEDICINE | Admitting: INTERNAL MEDICINE
Payer: MEDICARE

## 2024-06-03 ENCOUNTER — APPOINTMENT (OUTPATIENT)
Dept: GENERAL RADIOLOGY | Age: 89
DRG: 854 | End: 2024-06-03
Payer: MEDICARE

## 2024-06-03 ENCOUNTER — HOSPITAL ENCOUNTER (OUTPATIENT)
Dept: WOUND CARE | Age: 89
Discharge: HOME | End: 2024-06-03
Payer: MEDICARE

## 2024-06-03 VITALS
BODY MASS INDEX: 17.58 KG/M2 | SYSTOLIC BLOOD PRESSURE: 125 MMHG | WEIGHT: 90 LBS | HEART RATE: 63 BPM | DIASTOLIC BLOOD PRESSURE: 52 MMHG | RESPIRATION RATE: 20 BRPM | TEMPERATURE: 98.1 F

## 2024-06-03 DIAGNOSIS — L03.115 CELLULITIS OF RIGHT LEG: Primary | ICD-10-CM

## 2024-06-03 DIAGNOSIS — S81.801A WOUND OF RIGHT LOWER EXTREMITY, INITIAL ENCOUNTER: Primary | ICD-10-CM

## 2024-06-03 DIAGNOSIS — L03.90 CELLULITIS, UNSPECIFIED CELLULITIS SITE: ICD-10-CM

## 2024-06-03 DIAGNOSIS — L03.115 CELLULITIS OF RIGHT LOWER EXTREMITY: ICD-10-CM

## 2024-06-03 LAB
ANION GAP SERPL CALCULATED.3IONS-SCNC: 11 MMOL/L (ref 7–16)
BASOPHILS # BLD: 0.05 K/UL (ref 0–0.2)
BASOPHILS NFR BLD: 1 % (ref 0–2)
BUN SERPL-MCNC: 17 MG/DL (ref 6–23)
CALCIUM SERPL-MCNC: 9.4 MG/DL (ref 8.6–10.2)
CHLORIDE SERPL-SCNC: 99 MMOL/L (ref 98–107)
CO2 SERPL-SCNC: 30 MMOL/L (ref 22–29)
CREAT SERPL-MCNC: 0.8 MG/DL (ref 0.5–1)
EOSINOPHIL # BLD: 0.25 K/UL (ref 0.05–0.5)
EOSINOPHILS RELATIVE PERCENT: 3 % (ref 0–6)
ERYTHROCYTE [DISTWIDTH] IN BLOOD BY AUTOMATED COUNT: 13.2 % (ref 11.5–15)
GFR, ESTIMATED: 75 ML/MIN/1.73M2
GLUCOSE SERPL-MCNC: 82 MG/DL (ref 74–99)
HCT VFR BLD AUTO: 40.4 % (ref 34–48)
HGB BLD-MCNC: 12.8 G/DL (ref 11.5–15.5)
IMM GRANULOCYTES # BLD AUTO: <0.03 K/UL (ref 0–0.58)
IMM GRANULOCYTES NFR BLD: 0 % (ref 0–5)
LYMPHOCYTES NFR BLD: 2.37 K/UL (ref 1.5–4)
LYMPHOCYTES RELATIVE PERCENT: 29 % (ref 20–42)
MCHC RBC AUTO-ENTMCNC: 31.7 G/DL (ref 32–34.5)
MCV RBC AUTO: 93.5 FL (ref 80–99.9)
MONOCYTES NFR BLD: 0.75 K/UL (ref 0.1–0.95)
MONOCYTES NFR BLD: 9 % (ref 2–12)
NEUTROPHILS NFR BLD: 58 % (ref 43–80)
NEUTS SEG NFR BLD: 4.77 K/UL (ref 1.8–7.3)
PLATELET # BLD AUTO: 231 K/UL (ref 130–450)
PMV BLD AUTO: 10.1 FL (ref 7–12)
POTASSIUM SERPL-SCNC: 3.9 MMOL/L (ref 3.5–5)
RBC # BLD AUTO: 4.32 M/UL (ref 3.5–5.5)
SODIUM SERPL-SCNC: 140 MMOL/L (ref 132–146)
WBC OTHER # BLD: 8.2 K/UL (ref 4.5–11.5)

## 2024-06-03 PROCEDURE — 99285 EMERGENCY DEPT VISIT HI MDM: CPT

## 2024-06-03 PROCEDURE — 87070 CULTURE OTHR SPECIMN AEROBIC: CPT

## 2024-06-03 PROCEDURE — 99213 OFFICE O/P EST LOW 20 MIN: CPT

## 2024-06-03 PROCEDURE — 85025 COMPLETE CBC W/AUTO DIFF WBC: CPT

## 2024-06-03 PROCEDURE — 6360000002 HC RX W HCPCS: Performed by: EMERGENCY MEDICINE

## 2024-06-03 PROCEDURE — 80048 BASIC METABOLIC PNL TOTAL CA: CPT

## 2024-06-03 PROCEDURE — 1200000000 HC SEMI PRIVATE

## 2024-06-03 PROCEDURE — 96374 THER/PROPH/DIAG INJ IV PUSH: CPT

## 2024-06-03 PROCEDURE — 73590 X-RAY EXAM OF LOWER LEG: CPT

## 2024-06-03 PROCEDURE — 2580000003 HC RX 258: Performed by: EMERGENCY MEDICINE

## 2024-06-03 PROCEDURE — 6370000000 HC RX 637 (ALT 250 FOR IP): Performed by: EMERGENCY MEDICINE

## 2024-06-03 PROCEDURE — 87205 SMEAR GRAM STAIN: CPT

## 2024-06-03 PROCEDURE — 87077 CULTURE AEROBIC IDENTIFY: CPT

## 2024-06-03 PROCEDURE — 87040 BLOOD CULTURE FOR BACTERIA: CPT

## 2024-06-03 RX ORDER — BUDESONIDE 0.25 MG/2ML
0.25 INHALANT ORAL
Status: DISCONTINUED | OUTPATIENT
Start: 2024-06-04 | End: 2024-06-11 | Stop reason: HOSPADM

## 2024-06-03 RX ORDER — BUDESONIDE AND FORMOTEROL FUMARATE DIHYDRATE 80; 4.5 UG/1; UG/1
2 AEROSOL RESPIRATORY (INHALATION)
Status: DISCONTINUED | OUTPATIENT
Start: 2024-06-03 | End: 2024-06-03 | Stop reason: CLARIF

## 2024-06-03 RX ORDER — LIDOCAINE 50 MG/G
OINTMENT TOPICAL ONCE
OUTPATIENT
Start: 2024-06-03 | End: 2024-06-03

## 2024-06-03 RX ORDER — LIDOCAINE HYDROCHLORIDE 40 MG/ML
SOLUTION TOPICAL ONCE
OUTPATIENT
Start: 2024-06-03 | End: 2024-06-03

## 2024-06-03 RX ORDER — POLYETHYLENE GLYCOL 3350 17 G/17G
17 POWDER, FOR SOLUTION ORAL DAILY PRN
Status: DISCONTINUED | OUTPATIENT
Start: 2024-06-03 | End: 2024-06-11 | Stop reason: HOSPADM

## 2024-06-03 RX ORDER — IBUPROFEN 200 MG
TABLET ORAL ONCE
OUTPATIENT
Start: 2024-06-03 | End: 2024-06-03

## 2024-06-03 RX ORDER — TRIAMCINOLONE ACETONIDE 1 MG/G
OINTMENT TOPICAL ONCE
OUTPATIENT
Start: 2024-06-03 | End: 2024-06-03

## 2024-06-03 RX ORDER — SODIUM CHLOR/HYPOCHLOROUS ACID 0.033 %
SOLUTION, IRRIGATION IRRIGATION ONCE
OUTPATIENT
Start: 2024-06-03 | End: 2024-06-03

## 2024-06-03 RX ORDER — LIDOCAINE HYDROCHLORIDE 20 MG/ML
JELLY TOPICAL ONCE
OUTPATIENT
Start: 2024-06-03 | End: 2024-06-03

## 2024-06-03 RX ORDER — GLUCAGON 1 MG/ML
1 KIT INJECTION PRN
Status: DISCONTINUED | OUTPATIENT
Start: 2024-06-03 | End: 2024-06-04

## 2024-06-03 RX ORDER — POTASSIUM CHLORIDE 7.45 MG/ML
10 INJECTION INTRAVENOUS PRN
Status: DISCONTINUED | OUTPATIENT
Start: 2024-06-03 | End: 2024-06-11 | Stop reason: HOSPADM

## 2024-06-03 RX ORDER — ENOXAPARIN SODIUM 100 MG/ML
40 INJECTION SUBCUTANEOUS DAILY
Status: DISCONTINUED | OUTPATIENT
Start: 2024-06-04 | End: 2024-06-03

## 2024-06-03 RX ORDER — CLOBETASOL PROPIONATE 0.5 MG/G
OINTMENT TOPICAL ONCE
OUTPATIENT
Start: 2024-06-03 | End: 2024-06-03

## 2024-06-03 RX ORDER — GENTAMICIN SULFATE 1 MG/G
OINTMENT TOPICAL ONCE
OUTPATIENT
Start: 2024-06-03 | End: 2024-06-03

## 2024-06-03 RX ORDER — LIDOCAINE 40 MG/G
CREAM TOPICAL ONCE
OUTPATIENT
Start: 2024-06-03 | End: 2024-06-03

## 2024-06-03 RX ORDER — GINSENG 100 MG
CAPSULE ORAL ONCE
OUTPATIENT
Start: 2024-06-03 | End: 2024-06-03

## 2024-06-03 RX ORDER — METOPROLOL TARTRATE 50 MG/1
50 TABLET, FILM COATED ORAL 2 TIMES DAILY PRN
Status: DISCONTINUED | OUTPATIENT
Start: 2024-06-03 | End: 2024-06-04

## 2024-06-03 RX ORDER — METOPROLOL TARTRATE 50 MG/1
50 TABLET, FILM COATED ORAL 2 TIMES DAILY PRN
Status: ON HOLD | COMMUNITY

## 2024-06-03 RX ORDER — DEXTROSE MONOHYDRATE 100 MG/ML
INJECTION, SOLUTION INTRAVENOUS CONTINUOUS PRN
Status: DISCONTINUED | OUTPATIENT
Start: 2024-06-03 | End: 2024-06-04

## 2024-06-03 RX ORDER — DOCUSATE SODIUM 100 MG/1
100 CAPSULE, LIQUID FILLED ORAL NIGHTLY
Status: ON HOLD | COMMUNITY

## 2024-06-03 RX ORDER — BETAMETHASONE DIPROPIONATE 0.5 MG/G
CREAM TOPICAL ONCE
OUTPATIENT
Start: 2024-06-03 | End: 2024-06-03

## 2024-06-03 RX ORDER — ACETAMINOPHEN AND CODEINE PHOSPHATE 300; 30 MG/1; MG/1
1 TABLET ORAL ONCE
Status: COMPLETED | OUTPATIENT
Start: 2024-06-03 | End: 2024-06-03

## 2024-06-03 RX ORDER — ARFORMOTEROL TARTRATE 15 UG/2ML
15 SOLUTION RESPIRATORY (INHALATION)
Status: DISCONTINUED | OUTPATIENT
Start: 2024-06-04 | End: 2024-06-11 | Stop reason: HOSPADM

## 2024-06-03 RX ORDER — SODIUM CHLORIDE 0.9 % (FLUSH) 0.9 %
5-40 SYRINGE (ML) INJECTION EVERY 12 HOURS SCHEDULED
Status: DISCONTINUED | OUTPATIENT
Start: 2024-06-03 | End: 2024-06-11 | Stop reason: HOSPADM

## 2024-06-03 RX ORDER — POTASSIUM CHLORIDE 20 MEQ/1
40 TABLET, EXTENDED RELEASE ORAL PRN
Status: DISCONTINUED | OUTPATIENT
Start: 2024-06-03 | End: 2024-06-11 | Stop reason: HOSPADM

## 2024-06-03 RX ORDER — SODIUM CHLORIDE 0.9 % (FLUSH) 0.9 %
5-40 SYRINGE (ML) INJECTION PRN
Status: DISCONTINUED | OUTPATIENT
Start: 2024-06-03 | End: 2024-06-11 | Stop reason: HOSPADM

## 2024-06-03 RX ORDER — ACETAMINOPHEN 650 MG/1
650 SUPPOSITORY RECTAL EVERY 6 HOURS PRN
Status: DISCONTINUED | OUTPATIENT
Start: 2024-06-03 | End: 2024-06-11 | Stop reason: HOSPADM

## 2024-06-03 RX ORDER — ACETAMINOPHEN 325 MG/1
650 TABLET ORAL EVERY 6 HOURS PRN
Status: DISCONTINUED | OUTPATIENT
Start: 2024-06-03 | End: 2024-06-11 | Stop reason: HOSPADM

## 2024-06-03 RX ORDER — SODIUM CHLORIDE 9 MG/ML
INJECTION, SOLUTION INTRAVENOUS PRN
Status: DISCONTINUED | OUTPATIENT
Start: 2024-06-03 | End: 2024-06-11 | Stop reason: HOSPADM

## 2024-06-03 RX ORDER — LIDOCAINE HYDROCHLORIDE 40 MG/ML
SOLUTION TOPICAL ONCE
Status: COMPLETED | OUTPATIENT
Start: 2024-06-03 | End: 2024-06-03

## 2024-06-03 RX ORDER — MAGNESIUM SULFATE IN WATER 40 MG/ML
2000 INJECTION, SOLUTION INTRAVENOUS PRN
Status: DISCONTINUED | OUTPATIENT
Start: 2024-06-03 | End: 2024-06-11 | Stop reason: HOSPADM

## 2024-06-03 RX ORDER — BACITRACIN ZINC AND POLYMYXIN B SULFATE 500; 1000 [USP'U]/G; [USP'U]/G
OINTMENT TOPICAL ONCE
OUTPATIENT
Start: 2024-06-03 | End: 2024-06-03

## 2024-06-03 RX ADMIN — LIDOCAINE HYDROCHLORIDE 10 ML: 40 SOLUTION TOPICAL at 15:28

## 2024-06-03 RX ADMIN — WATER 1000 MG: 1 INJECTION INTRAMUSCULAR; INTRAVENOUS; SUBCUTANEOUS at 18:54

## 2024-06-03 RX ADMIN — ACETAMINOPHEN AND CODEINE PHOSPHATE 1 TABLET: 300; 30 TABLET ORAL at 20:59

## 2024-06-03 ASSESSMENT — PAIN DESCRIPTION - ORIENTATION
ORIENTATION: RIGHT
ORIENTATION: RIGHT

## 2024-06-03 ASSESSMENT — PAIN SCALES - GENERAL
PAINLEVEL_OUTOF10: 5
PAINLEVEL_OUTOF10: 6

## 2024-06-03 ASSESSMENT — PAIN DESCRIPTION - LOCATION
LOCATION: LEG;GENERALIZED
LOCATION: LEG

## 2024-06-03 ASSESSMENT — PAIN DESCRIPTION - DESCRIPTORS
DESCRIPTORS: ACHING
DESCRIPTORS: THROBBING

## 2024-06-03 NOTE — ED PROVIDER NOTES
[facility] EMERGENCY DEPARTMENT  EMERGENCY DEPARTMENT ENCOUNTER        Pt Name: Shauna Gaines  MRN: [id number]  Birthdate 1935  Date of evaluation: 6/3/2024  Provider: Ross Ellison DO  PCP: Ross Mercer MD  Note Started: 5:31 PM EDT 6/3/24    CHIEF COMPLAINT       Chief Complaint   Patient presents with    Wound Check     Sent from admission by wound care clinic       HISTORY OF PRESENT ILLNESS: 1 or more Elements   History From: patient    Limitations to history : None    Shauna Gaines is a 89 y.o. female who presents to the ED for evaluation of a wound on her right lower extremity.  She states the dog had brushed up against her and caused a skin tear approximately week ago.  She states that she was not bitten by the dog.  She was started on antibiotics and has been following with the outpatient wound clinic.  She was seen today for the wound not progressing.  Was advised to come here for further treatment including IV antibiotics.  Patient denies any fevers or chills.  She has noted increased pain as well as swelling and increased redness around the wound.  Patient states she has been taking antibiotics as prescribed.    Nursing Notes were all reviewed and agreed with or any disagreements were addressed in the HPI.      REVIEW OF EXTERNAL NOTE :       Chart review shows that patient was seen at the wound clinic today by Dr. Dexter.  Patient has a wound on right lower extremity that has been present for a week.  Treated with outpatient antibiotics.  Patient was seen for wound not healing.  Patient currently on doxycycline    REVIEW OF SYSTEMS :           Positives and Pertinent negatives as per HPI.     SURGICAL HISTORY     Past Surgical History:   Procedure Laterality Date    APPENDECTOMY      BACK SURGERY      x3    CHOLECYSTECTOMY      HERNIA REPAIR  2021    HIP ARTHROPLASTY Bilateral     PACEMAKER INSERTION  03/18/2024    TONSILLECTOMY         CURRENTMEDICATIONS

## 2024-06-03 NOTE — PROGRESS NOTES
Wound Healing Center  History and Physical/Consultation  Podiatry    Referring Physician : Ross Mercer MD  Shauna Gaines  MEDICAL RECORD NUMBER:  [id number]  AGE: 89 y.o.   GENDER: female  : 1935  EPISODE DATE:  6/3/2024  Subjective:     Chief Complaint   Patient presents with    Wound Check     Right leg         HISTORY of PRESENT ILLNESS HPI     Shauna Gaines is a 89 y.o. female who presents today for wound/ulcer evaluation.   History of Wound Context:  The patient has had a wound of right leg which was first noted approximately >1 week.  This has been treated with PO abx. On their initial visit to the wound healing center, 24 ,  the patient has noted that the wound has not been improving.  The patient has not had similar previous wounds in the past.      Pt is on abx at time of initial visit.      Wound/Ulcer Pain Timing/Severity: constant  Quality of pain: sharp  Severity:  8 / 10   Modifying Factors: Pain worsens with walking  Associated Signs/Symptoms: erythema    Ulcer Identification:  Ulcer Type: skin tear  Contributing Factors: shear force    Diabetic/Pressure/Non Pressure Ulcers onl y:  Ulcer: Non-Pressure ulcer, muscle necrosis    If patient has diabetic lower extremity wounds  Pulido Classification of diabetic lower extremity wounds:    Grade Description   []  0 No open wound   []  1 Superficial ulcer involving the full skin thickness   [x]  2 Deep ulcer involves ligament, tendon, joint capsule, or fascia  No bone involvement or abscess presence   []  3 Deep Ulcer with abcess formation and/or osteomyelitis   []  4 Localized gangrene   []  5 Extensive gangrene of the foot     Wound: Open bite        PAST MEDICAL HISTORY      Diagnosis Date    Aortic valve stenosis     Asthma     COPD (chronic obstructive pulmonary disease) ([facility])     COVID 2022    Hearing aid worn     Heart murmur     Hyperlipidemia     Hypertension      Past Surgical History:   Procedure Laterality Date

## 2024-06-03 NOTE — PLAN OF CARE
Problem: Pain  Goal: Verbalizes/displays adequate comfort level or baseline comfort level  Outcome: Progressing     Problem: ABCDS Injury Assessment  Goal: Absence of physical injury  Outcome: Progressing     Problem: Cognitive:  Goal: Knowledge of wound care  Description: Knowledge of wound care  Outcome: Progressing  Goal: Understands risk factors for wounds  Description: Understands risk factors for wounds  Outcome: Progressing     Problem: Wound:  Goal: Will show signs of wound healing; wound closure and no evidence of infection  Description: Will show signs of wound healing; wound closure and no evidence of infection  Outcome: Progressing     Problem: Venous:  Goal: Signs of wound healing will improve  Description: Signs of wound healing will improve  Outcome: Progressing

## 2024-06-03 NOTE — DISCHARGE INSTRUCTIONS
Visit Discharge/Physician Orders    Discharge condition: Stable    Assessment of pain at discharge: moderate    Anesthetic used: 4% lidocaine    Discharge to: Home    Left via:Private automobile    Accompanied by: family    ECF/HHA:     Dressing Orders: Right leg: cleanse wound with normal saline, apply wet to dry dressing    Treatment Orders:    [facility] followup visit _____to ER for admission________________________  (Please note your next appointment above and if you are unable to keep, kindly give a 24 hour notice. Thank you.)    Physician signature:__________________________      If you experience any of the following, please call the Wound Care Center during business hours:    * Increase in Pain  * Temperature over 101  * Increase in drainage from your wound  * Drainage with a foul odor  * Bleeding  * Increase in swelling  * Need for compression bandage changes due to slippage, breakthrough drainage.    If you need medical attention outside of the business hours of the Wound Care Centers please contact your PCP or go to the nearest emergency room.

## 2024-06-04 ENCOUNTER — ANESTHESIA (OUTPATIENT)
Dept: OPERATING ROOM | Age: 89
End: 2024-06-04
Payer: MEDICARE

## 2024-06-04 ENCOUNTER — ANESTHESIA EVENT (OUTPATIENT)
Dept: OPERATING ROOM | Age: 89
End: 2024-06-04
Payer: MEDICARE

## 2024-06-04 PROBLEM — S81.801A WOUND OF RIGHT LEG: Status: ACTIVE | Noted: 2024-06-04

## 2024-06-04 LAB
ALBUMIN SERPL-MCNC: 3.7 G/DL (ref 3.5–5.2)
ALP SERPL-CCNC: 100 U/L (ref 35–104)
ALT SERPL-CCNC: 7 U/L (ref 0–32)
ANION GAP SERPL CALCULATED.3IONS-SCNC: 10 MMOL/L (ref 7–16)
ASO AB SERPL-ACNC: <20 IU/ML (ref 0–200)
AST SERPL-CCNC: 20 U/L (ref 0–31)
BILIRUB SERPL-MCNC: 0.3 MG/DL (ref 0–1.2)
BUN SERPL-MCNC: 16 MG/DL (ref 6–23)
CALCIUM SERPL-MCNC: 9.4 MG/DL (ref 8.6–10.2)
CHLORIDE SERPL-SCNC: 103 MMOL/L (ref 98–107)
CO2 SERPL-SCNC: 29 MMOL/L (ref 22–29)
CREAT SERPL-MCNC: 0.7 MG/DL (ref 0.5–1)
CRP SERPL HS-MCNC: 15 MG/L (ref 0–5)
ERYTHROCYTE [SEDIMENTATION RATE] IN BLOOD BY WESTERGREN METHOD: 19 MM/HR (ref 0–20)
GFR, ESTIMATED: 83 ML/MIN/1.73M2
GLUCOSE SERPL-MCNC: 82 MG/DL (ref 74–99)
INR PPP: 1.5
MAGNESIUM SERPL-MCNC: 1.7 MG/DL (ref 1.6–2.6)
PHOSPHATE SERPL-MCNC: 3.5 MG/DL (ref 2.5–4.5)
POTASSIUM SERPL-SCNC: 3.9 MMOL/L (ref 3.5–5)
PROCALCITONIN SERPL-MCNC: 0.03 NG/ML (ref 0–0.08)
PROT SERPL-MCNC: 6.8 G/DL (ref 6.4–8.3)
PROTHROMBIN TIME: 16.7 SEC (ref 9.3–12.4)
SODIUM SERPL-SCNC: 142 MMOL/L (ref 132–146)
T4 FREE SERPL-MCNC: 1.2 NG/DL (ref 0.9–1.7)
TSH SERPL DL<=0.05 MIU/L-ACNC: 1.02 UIU/ML (ref 0.27–4.2)

## 2024-06-04 PROCEDURE — 86063 ANTISTREPTOLYSIN O SCREEN: CPT

## 2024-06-04 PROCEDURE — 84439 ASSAY OF FREE THYROXINE: CPT

## 2024-06-04 PROCEDURE — 6360000002 HC RX W HCPCS: Performed by: INTERNAL MEDICINE

## 2024-06-04 PROCEDURE — 93005 ELECTROCARDIOGRAM TRACING: CPT

## 2024-06-04 PROCEDURE — 36415 COLL VENOUS BLD VENIPUNCTURE: CPT

## 2024-06-04 PROCEDURE — 86140 C-REACTIVE PROTEIN: CPT

## 2024-06-04 PROCEDURE — 2580000003 HC RX 258: Performed by: INTERNAL MEDICINE

## 2024-06-04 PROCEDURE — 6370000000 HC RX 637 (ALT 250 FOR IP): Performed by: INTERNAL MEDICINE

## 2024-06-04 PROCEDURE — 80053 COMPREHEN METABOLIC PANEL: CPT

## 2024-06-04 PROCEDURE — 84145 PROCALCITONIN (PCT): CPT

## 2024-06-04 PROCEDURE — 94640 AIRWAY INHALATION TREATMENT: CPT

## 2024-06-04 PROCEDURE — 6360000002 HC RX W HCPCS: Performed by: NURSE PRACTITIONER

## 2024-06-04 PROCEDURE — 85652 RBC SED RATE AUTOMATED: CPT

## 2024-06-04 PROCEDURE — 99223 1ST HOSP IP/OBS HIGH 75: CPT | Performed by: INTERNAL MEDICINE

## 2024-06-04 PROCEDURE — 6370000000 HC RX 637 (ALT 250 FOR IP): Performed by: NURSE PRACTITIONER

## 2024-06-04 PROCEDURE — 84100 ASSAY OF PHOSPHORUS: CPT

## 2024-06-04 PROCEDURE — 83735 ASSAY OF MAGNESIUM: CPT

## 2024-06-04 PROCEDURE — APPSS60 APP SPLIT SHARED TIME 46-60 MINUTES

## 2024-06-04 PROCEDURE — 84443 ASSAY THYROID STIM HORMONE: CPT

## 2024-06-04 PROCEDURE — 97161 PT EVAL LOW COMPLEX 20 MIN: CPT | Performed by: PHYSICAL THERAPIST

## 2024-06-04 PROCEDURE — 85610 PROTHROMBIN TIME: CPT

## 2024-06-04 PROCEDURE — 2580000003 HC RX 258: Performed by: NURSE PRACTITIONER

## 2024-06-04 PROCEDURE — 1200000000 HC SEMI PRIVATE

## 2024-06-04 RX ORDER — ENOXAPARIN SODIUM 100 MG/ML
40 INJECTION SUBCUTANEOUS DAILY
Status: DISCONTINUED | OUTPATIENT
Start: 2024-06-04 | End: 2024-06-04 | Stop reason: DRUGHIGH

## 2024-06-04 RX ORDER — FUROSEMIDE 40 MG/1
40 TABLET ORAL DAILY
Status: DISCONTINUED | OUTPATIENT
Start: 2024-06-04 | End: 2024-06-11 | Stop reason: HOSPADM

## 2024-06-04 RX ORDER — ATORVASTATIN CALCIUM 10 MG/1
10 TABLET, FILM COATED ORAL DAILY
Status: DISCONTINUED | OUTPATIENT
Start: 2024-06-04 | End: 2024-06-11 | Stop reason: HOSPADM

## 2024-06-04 RX ORDER — ALBUTEROL SULFATE 2.5 MG/3ML
2.5 SOLUTION RESPIRATORY (INHALATION)
Status: DISCONTINUED | OUTPATIENT
Start: 2024-06-04 | End: 2024-06-04

## 2024-06-04 RX ORDER — HYDROCODONE BITARTRATE AND ACETAMINOPHEN 5; 325 MG/1; MG/1
1 TABLET ORAL EVERY 6 HOURS PRN
Status: DISCONTINUED | OUTPATIENT
Start: 2024-06-04 | End: 2024-06-06

## 2024-06-04 RX ORDER — ALBUTEROL SULFATE 2.5 MG/3ML
2.5 SOLUTION RESPIRATORY (INHALATION) ONCE
Status: COMPLETED | OUTPATIENT
Start: 2024-06-04 | End: 2024-06-04

## 2024-06-04 RX ORDER — METOPROLOL TARTRATE 50 MG/1
50 TABLET, FILM COATED ORAL 2 TIMES DAILY
Status: DISCONTINUED | OUTPATIENT
Start: 2024-06-04 | End: 2024-06-11 | Stop reason: HOSPADM

## 2024-06-04 RX ORDER — POTASSIUM CHLORIDE 20 MEQ/1
20 TABLET, EXTENDED RELEASE ORAL DAILY
Status: DISCONTINUED | OUTPATIENT
Start: 2024-06-04 | End: 2024-06-04

## 2024-06-04 RX ORDER — DOCUSATE SODIUM 100 MG/1
100 CAPSULE, LIQUID FILLED ORAL NIGHTLY
Status: DISCONTINUED | OUTPATIENT
Start: 2024-06-04 | End: 2024-06-11 | Stop reason: HOSPADM

## 2024-06-04 RX ORDER — SODIUM CHLORIDE, SODIUM LACTATE, POTASSIUM CHLORIDE, CALCIUM CHLORIDE 600; 310; 30; 20 MG/100ML; MG/100ML; MG/100ML; MG/100ML
INJECTION, SOLUTION INTRAVENOUS CONTINUOUS
Status: ACTIVE | OUTPATIENT
Start: 2024-06-04 | End: 2024-06-06

## 2024-06-04 RX ORDER — ENOXAPARIN SODIUM 100 MG/ML
30 INJECTION SUBCUTANEOUS DAILY
Status: DISCONTINUED | OUTPATIENT
Start: 2024-06-04 | End: 2024-06-06

## 2024-06-04 RX ORDER — HYDRALAZINE HYDROCHLORIDE 20 MG/ML
5 INJECTION INTRAMUSCULAR; INTRAVENOUS EVERY 4 HOURS PRN
Status: DISCONTINUED | OUTPATIENT
Start: 2024-06-04 | End: 2024-06-11 | Stop reason: HOSPADM

## 2024-06-04 RX ADMIN — BUDESONIDE 250 MCG: 0.25 SUSPENSION RESPIRATORY (INHALATION) at 16:30

## 2024-06-04 RX ADMIN — VANCOMYCIN HYDROCHLORIDE 750 MG: 5 INJECTION, POWDER, LYOPHILIZED, FOR SOLUTION INTRAVENOUS at 12:03

## 2024-06-04 RX ADMIN — WATER 1000 MG: 1 INJECTION INTRAMUSCULAR; INTRAVENOUS; SUBCUTANEOUS at 20:42

## 2024-06-04 RX ADMIN — METOPROLOL TARTRATE 50 MG: 50 TABLET, FILM COATED ORAL at 10:01

## 2024-06-04 RX ADMIN — DOCUSATE SODIUM 100 MG: 100 CAPSULE, LIQUID FILLED ORAL at 20:43

## 2024-06-04 RX ADMIN — ALBUTEROL SULFATE 2.5 MG: 2.5 SOLUTION RESPIRATORY (INHALATION) at 04:22

## 2024-06-04 RX ADMIN — ARFORMOTEROL TARTRATE 15 MCG: 15 SOLUTION RESPIRATORY (INHALATION) at 04:22

## 2024-06-04 RX ADMIN — ALBUTEROL SULFATE 2.5 MG: 2.5 SOLUTION RESPIRATORY (INHALATION) at 08:42

## 2024-06-04 RX ADMIN — WATER 1000 MG: 1 INJECTION INTRAMUSCULAR; INTRAVENOUS; SUBCUTANEOUS at 10:01

## 2024-06-04 RX ADMIN — BUDESONIDE 250 MCG: 0.25 SUSPENSION RESPIRATORY (INHALATION) at 04:22

## 2024-06-04 RX ADMIN — SODIUM CHLORIDE, POTASSIUM CHLORIDE, SODIUM LACTATE AND CALCIUM CHLORIDE: 600; 310; 30; 20 INJECTION, SOLUTION INTRAVENOUS at 12:03

## 2024-06-04 RX ADMIN — ENOXAPARIN SODIUM 30 MG: 100 INJECTION SUBCUTANEOUS at 10:01

## 2024-06-04 RX ADMIN — Medication 10 ML: at 10:04

## 2024-06-04 RX ADMIN — HYDROCODONE BITARTRATE AND ACETAMINOPHEN 1 TABLET: 5; 325 TABLET ORAL at 10:01

## 2024-06-04 RX ADMIN — ATORVASTATIN CALCIUM 10 MG: 10 TABLET, FILM COATED ORAL at 10:01

## 2024-06-04 RX ADMIN — HYDROCODONE BITARTRATE AND ACETAMINOPHEN 1 TABLET: 5; 325 TABLET ORAL at 20:42

## 2024-06-04 RX ADMIN — ARFORMOTEROL TARTRATE 15 MCG: 15 SOLUTION RESPIRATORY (INHALATION) at 16:29

## 2024-06-04 RX ADMIN — Medication 10 ML: at 20:46

## 2024-06-04 ASSESSMENT — PAIN DESCRIPTION - ORIENTATION
ORIENTATION: RIGHT
ORIENTATION: RIGHT

## 2024-06-04 ASSESSMENT — PAIN DESCRIPTION - DESCRIPTORS
DESCRIPTORS: THROBBING
DESCRIPTORS: THROBBING

## 2024-06-04 ASSESSMENT — PAIN DESCRIPTION - LOCATION
LOCATION: LEG
LOCATION: LEG

## 2024-06-04 ASSESSMENT — PAIN SCALES - GENERAL
PAINLEVEL_OUTOF10: 5
PAINLEVEL_OUTOF10: 6

## 2024-06-04 NOTE — H&P
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  Patient follows with Dr. Liu podiatry.  She was on trial at outpatient antibiotics without improvement.  In the ED cultures have been ordered.  Podiatry consulted with plans for debridement tomorrow.  Patient will be continued on IV antibiotics as well as IV fluids.    Gay Garza DO  11:01 PM  6/3/2024    Electronically signed by aGy Garza DO on 6/3/24 at 11:01 PM EDT

## 2024-06-04 NOTE — ED NOTES
Pt had run of tachycardia. Dr holloway given strip that was printed for evaluation. Primary RN checked on pt and pt was in no acute distress

## 2024-06-04 NOTE — ACP (ADVANCE CARE PLANNING)
Advance Care Planning   Healthcare Decision Maker:    Primary Decision Maker: Luzmaria Keller - Child - [phone]    Primary Decision Maker: Irena Edouard - Child - [phone]

## 2024-06-04 NOTE — ANESTHESIA PRE PROCEDURE
Department of Anesthesiology  Preprocedure Note       Name:  Shauna Gaines   Age:  89 y.o.  :  1935                                          MRN:  [id number]         Date:  2024      Surgeon: Surgeon(s):  Jared Dexter DPM    Procedure: Procedure(s):  RIGHT LEG INCISION AND DRAINAGE POSSIBLE WOUND VAC APPLICATION    Medications prior to admission:   Prior to Admission medications    Medication Sig Start Date End Date Taking? Authorizing Provider   docusate sodium (COLACE) 100 MG capsule Take 1 capsule by mouth nightly   Yes Stephania Hardy MD   OXYGEN Inhale 1.5-2 L into the lungs as needed for Shortness of Breath   Yes Stephania Hardy MD   metoprolol tartrate (LOPRESSOR) 50 MG tablet Take 1 tablet by mouth 2 times daily as needed (heart rate >80)    Stephania Hardy MD   doxycycline hyclate (VIBRA-TABS) 100 MG tablet Take 1 tablet by mouth 2 times daily for 10 days 24  Kerrie Underwood, APRN - CNP   ELIQUIS 2.5 MG TABS tablet Take 1 tablet by mouth 2 times daily 24   Ritika Tripathi MD   potassium chloride (KLOR-CON M) 20 MEQ extended release tablet Take 1 tablet by mouth daily 24   Ritika Tripathi MD   SYMBICORT 80-4.5 MCG/ACT AERO Inhale 2 puffs into the lungs in the morning and 2 puffs in the evening. 23   Stephania Hardy MD   acetaminophen-codeine (TYLENOL #3) 300-30 MG per tablet Take 1 tablet by mouth every 8 hours as needed for Pain.    Stephania Hardy MD   furosemide (LASIX) 20 MG tablet Take 2 tablets by mouth daily    Stephania Hardy MD   simvastatin (ZOCOR) 20 MG tablet Take 1 tablet by mouth daily    Stephania Hardy MD   Cholecalciferol (VITAMIN D3) 50 MCG ( UT) CAPS Take 1 capsule by mouth daily    Stephania Hardy MD   Multiple Vitamins-Minerals (PROSIGHT) TABS Take 1 tablet by mouth daily    Stephania Hardy MD   albuterol (PROVENTIL) (2.5 MG/3ML) 0.083% nebulizer solution Take 3 mLs by nebulization in the

## 2024-06-04 NOTE — CARE COORDINATION
Case Management Assessment  Initial Evaluation    Date/Time of Evaluation: 6/4/2024 3:31 PM  Assessment Completed by: Mihaela Santos RN    If patient is discharged prior to next notation, then this note serves as note for discharge by case management.    Patient Name: Shauna Gaines                   YOB: 1935  Diagnosis: Cellulitis [L03.90]  Cellulitis of right lower extremity [L03.115]  Wound of right lower extremity, initial encounter [S81.801A]                   Date / Time: 6/3/2024  5:21 PM    Patient Admission Status: Inpatient   Readmission Risk (Low < 19, Mod (19-27), High > 27): Readmission Risk Score: 9.3    Current PCP: Ross Mercer MD  PCP verified by CM? Yes    Chart Reviewed: Yes      History Provided by: Patient  Patient Orientation: Alert and Oriented, Person, Place, Situation    Patient Cognition: Alert    Hospitalization in the last 30 days (Readmission):  No    If yes, Readmission Assessment in  Navigator will be completed.    Advance Directives:      Code Status: Full Code   Patient's Primary Decision Maker is: Legal Next of Kin    Primary Decision Maker: AriannaLuzmaria - Child - [phone]    Primary Decision Maker: Irena Edouard  Child - [phone]    Discharge Planning:    Patient lives with: Children Type of Home: House  Primary Care Giver: Self  Patient Support Systems include: Children, Family Members   Current Financial resources:    Current community resources:    Current services prior to admission: Other (Comment) (wound care)            Current DME:              Type of Home Care services:  None    ADLS  Prior functional level: Independent in ADLs/IADLs  Current functional level: Independent in ADLs/IADLs    PT AM-PAC: 18 /24  OT AM-PAC:   /24    Family can provide assistance at DC: Yes  Would you like Case Management to discuss the discharge plan with any other family members/significant others, and if so, who? Yes (dtr Luzmaria at bedside)  Plans to Return to Present

## 2024-06-05 LAB
ALBUMIN SERPL-MCNC: 3 G/DL (ref 3.5–5.2)
ALP SERPL-CCNC: 80 U/L (ref 35–104)
ALT SERPL-CCNC: <5 U/L (ref 0–32)
ANION GAP SERPL CALCULATED.3IONS-SCNC: 9 MMOL/L (ref 7–16)
AST SERPL-CCNC: 16 U/L (ref 0–31)
BASOPHILS # BLD: 0.04 K/UL (ref 0–0.2)
BASOPHILS NFR BLD: 1 % (ref 0–2)
BILIRUB SERPL-MCNC: 0.4 MG/DL (ref 0–1.2)
BUN SERPL-MCNC: 11 MG/DL (ref 6–23)
CALCIUM SERPL-MCNC: 9.3 MG/DL (ref 8.6–10.2)
CHLORIDE SERPL-SCNC: 103 MMOL/L (ref 98–107)
CO2 SERPL-SCNC: 29 MMOL/L (ref 22–29)
CREAT SERPL-MCNC: 0.6 MG/DL (ref 0.5–1)
EOSINOPHIL # BLD: 0.25 K/UL (ref 0.05–0.5)
EOSINOPHILS RELATIVE PERCENT: 4 % (ref 0–6)
ERYTHROCYTE [DISTWIDTH] IN BLOOD BY AUTOMATED COUNT: 13.3 % (ref 11.5–15)
ERYTHROCYTE [SEDIMENTATION RATE] IN BLOOD BY WESTERGREN METHOD: 18 MM/HR (ref 0–20)
GFR, ESTIMATED: 85 ML/MIN/1.73M2
GLUCOSE SERPL-MCNC: 87 MG/DL (ref 74–99)
HCT VFR BLD AUTO: 35.6 % (ref 34–48)
HGB BLD-MCNC: 11 G/DL (ref 11.5–15.5)
IMM GRANULOCYTES # BLD AUTO: <0.03 K/UL (ref 0–0.58)
IMM GRANULOCYTES NFR BLD: 0 % (ref 0–5)
LYMPHOCYTES NFR BLD: 1.64 K/UL (ref 1.5–4)
LYMPHOCYTES RELATIVE PERCENT: 28 % (ref 20–42)
MAGNESIUM SERPL-MCNC: 1.6 MG/DL (ref 1.6–2.6)
MCH RBC QN AUTO: 28.9 PG (ref 26–35)
MCHC RBC AUTO-ENTMCNC: 30.9 G/DL (ref 32–34.5)
MCV RBC AUTO: 93.7 FL (ref 80–99.9)
MONOCYTES NFR BLD: 0.72 K/UL (ref 0.1–0.95)
MONOCYTES NFR BLD: 12 % (ref 2–12)
NEUTROPHILS NFR BLD: 55 % (ref 43–80)
NEUTS SEG NFR BLD: 3.24 K/UL (ref 1.8–7.3)
PHOSPHATE SERPL-MCNC: 3.4 MG/DL (ref 2.5–4.5)
PLATELET # BLD AUTO: 179 K/UL (ref 130–450)
PMV BLD AUTO: 10.1 FL (ref 7–12)
POTASSIUM SERPL-SCNC: 3.7 MMOL/L (ref 3.5–5)
PROT SERPL-MCNC: 5.6 G/DL (ref 6.4–8.3)
RBC # BLD AUTO: 3.8 M/UL (ref 3.5–5.5)
SODIUM SERPL-SCNC: 141 MMOL/L (ref 132–146)
WBC OTHER # BLD: 5.9 K/UL (ref 4.5–11.5)

## 2024-06-05 PROCEDURE — 97535 SELF CARE MNGMENT TRAINING: CPT

## 2024-06-05 PROCEDURE — 87102 FUNGUS ISOLATION CULTURE: CPT

## 2024-06-05 PROCEDURE — 84100 ASSAY OF PHOSPHORUS: CPT

## 2024-06-05 PROCEDURE — 6360000002 HC RX W HCPCS

## 2024-06-05 PROCEDURE — 2709999900 HC NON-CHARGEABLE SUPPLY: Performed by: PODIATRIST

## 2024-06-05 PROCEDURE — 6360000002 HC RX W HCPCS: Performed by: CLINICAL NURSE SPECIALIST

## 2024-06-05 PROCEDURE — 6370000000 HC RX 637 (ALT 250 FOR IP)

## 2024-06-05 PROCEDURE — 6360000002 HC RX W HCPCS: Performed by: ANESTHESIOLOGY

## 2024-06-05 PROCEDURE — 97165 OT EVAL LOW COMPLEX 30 MIN: CPT

## 2024-06-05 PROCEDURE — 0J9N0ZZ DRAINAGE OF RIGHT LOWER LEG SUBCUTANEOUS TISSUE AND FASCIA, OPEN APPROACH: ICD-10-PCS | Performed by: PODIATRIST

## 2024-06-05 PROCEDURE — 85652 RBC SED RATE AUTOMATED: CPT

## 2024-06-05 PROCEDURE — 6360000002 HC RX W HCPCS: Performed by: INTERNAL MEDICINE

## 2024-06-05 PROCEDURE — 7100000001 HC PACU RECOVERY - ADDTL 15 MIN: Performed by: PODIATRIST

## 2024-06-05 PROCEDURE — 87077 CULTURE AEROBIC IDENTIFY: CPT

## 2024-06-05 PROCEDURE — 80053 COMPREHEN METABOLIC PANEL: CPT

## 2024-06-05 PROCEDURE — 6360000002 HC RX W HCPCS: Performed by: PODIATRIST

## 2024-06-05 PROCEDURE — 6360000002 HC RX W HCPCS: Performed by: NURSE ANESTHETIST, CERTIFIED REGISTERED

## 2024-06-05 PROCEDURE — 7100000000 HC PACU RECOVERY - FIRST 15 MIN: Performed by: PODIATRIST

## 2024-06-05 PROCEDURE — 2580000003 HC RX 258: Performed by: CLINICAL NURSE SPECIALIST

## 2024-06-05 PROCEDURE — 94640 AIRWAY INHALATION TREATMENT: CPT

## 2024-06-05 PROCEDURE — 36415 COLL VENOUS BLD VENIPUNCTURE: CPT

## 2024-06-05 PROCEDURE — 87205 SMEAR GRAM STAIN: CPT

## 2024-06-05 PROCEDURE — 3700000000 HC ANESTHESIA ATTENDED CARE: Performed by: PODIATRIST

## 2024-06-05 PROCEDURE — 3700000001 HC ADD 15 MINUTES (ANESTHESIA): Performed by: PODIATRIST

## 2024-06-05 PROCEDURE — 87075 CULTR BACTERIA EXCEPT BLOOD: CPT

## 2024-06-05 PROCEDURE — 6360000002 HC RX W HCPCS: Performed by: NURSE PRACTITIONER

## 2024-06-05 PROCEDURE — 3600000002 HC SURGERY LEVEL 2 BASE: Performed by: PODIATRIST

## 2024-06-05 PROCEDURE — 97530 THERAPEUTIC ACTIVITIES: CPT

## 2024-06-05 PROCEDURE — 2580000003 HC RX 258: Performed by: NURSE PRACTITIONER

## 2024-06-05 PROCEDURE — 2580000003 HC RX 258: Performed by: INTERNAL MEDICINE

## 2024-06-05 PROCEDURE — 99233 SBSQ HOSP IP/OBS HIGH 50: CPT | Performed by: INTERNAL MEDICINE

## 2024-06-05 PROCEDURE — 6370000000 HC RX 637 (ALT 250 FOR IP): Performed by: CLINICAL NURSE SPECIALIST

## 2024-06-05 PROCEDURE — 85025 COMPLETE CBC W/AUTO DIFF WBC: CPT

## 2024-06-05 PROCEDURE — 87070 CULTURE OTHR SPECIMN AEROBIC: CPT

## 2024-06-05 PROCEDURE — 2700000000 HC OXYGEN THERAPY PER DAY

## 2024-06-05 PROCEDURE — 2580000003 HC RX 258

## 2024-06-05 PROCEDURE — 1200000000 HC SEMI PRIVATE

## 2024-06-05 PROCEDURE — 83735 ASSAY OF MAGNESIUM: CPT

## 2024-06-05 PROCEDURE — 0KBS0ZZ EXCISION OF RIGHT LOWER LEG MUSCLE, OPEN APPROACH: ICD-10-PCS | Performed by: PODIATRIST

## 2024-06-05 PROCEDURE — 86140 C-REACTIVE PROTEIN: CPT

## 2024-06-05 PROCEDURE — 3600000012 HC SURGERY LEVEL 2 ADDTL 15MIN: Performed by: PODIATRIST

## 2024-06-05 RX ORDER — NALOXONE HYDROCHLORIDE 0.4 MG/ML
INJECTION, SOLUTION INTRAMUSCULAR; INTRAVENOUS; SUBCUTANEOUS PRN
Status: DISCONTINUED | OUTPATIENT
Start: 2024-06-05 | End: 2024-06-05 | Stop reason: HOSPADM

## 2024-06-05 RX ORDER — SODIUM CHLORIDE 0.9 % (FLUSH) 0.9 %
5-40 SYRINGE (ML) INJECTION EVERY 12 HOURS SCHEDULED
Status: DISCONTINUED | OUTPATIENT
Start: 2024-06-05 | End: 2024-06-05 | Stop reason: HOSPADM

## 2024-06-05 RX ORDER — ONDANSETRON 2 MG/ML
4 INJECTION INTRAMUSCULAR; INTRAVENOUS
Status: DISCONTINUED | OUTPATIENT
Start: 2024-06-05 | End: 2024-06-05 | Stop reason: HOSPADM

## 2024-06-05 RX ORDER — SODIUM CHLORIDE 9 MG/ML
INJECTION, SOLUTION INTRAVENOUS PRN
Status: DISCONTINUED | OUTPATIENT
Start: 2024-06-05 | End: 2024-06-05 | Stop reason: HOSPADM

## 2024-06-05 RX ORDER — FENTANYL CITRATE 0.05 MG/ML
50 INJECTION, SOLUTION INTRAMUSCULAR; INTRAVENOUS EVERY 5 MIN PRN
Status: DISCONTINUED | OUTPATIENT
Start: 2024-06-05 | End: 2024-06-05 | Stop reason: HOSPADM

## 2024-06-05 RX ORDER — PROPOFOL 10 MG/ML
INJECTION, EMULSION INTRAVENOUS CONTINUOUS PRN
Status: DISCONTINUED | OUTPATIENT
Start: 2024-06-05 | End: 2024-06-05 | Stop reason: SDUPTHER

## 2024-06-05 RX ORDER — DOXYCYCLINE HYCLATE 100 MG/1
100 CAPSULE ORAL EVERY 12 HOURS SCHEDULED
Status: DISCONTINUED | OUTPATIENT
Start: 2024-06-05 | End: 2024-06-07

## 2024-06-05 RX ORDER — FENTANYL CITRATE 50 UG/ML
INJECTION, SOLUTION INTRAMUSCULAR; INTRAVENOUS PRN
Status: DISCONTINUED | OUTPATIENT
Start: 2024-06-05 | End: 2024-06-05 | Stop reason: SDUPTHER

## 2024-06-05 RX ORDER — SODIUM CHLORIDE 0.9 % (FLUSH) 0.9 %
5-40 SYRINGE (ML) INJECTION PRN
Status: DISCONTINUED | OUTPATIENT
Start: 2024-06-05 | End: 2024-06-05 | Stop reason: HOSPADM

## 2024-06-05 RX ORDER — ONDANSETRON 2 MG/ML
4 INJECTION INTRAMUSCULAR; INTRAVENOUS EVERY 6 HOURS PRN
Status: DISCONTINUED | OUTPATIENT
Start: 2024-06-05 | End: 2024-06-11 | Stop reason: HOSPADM

## 2024-06-05 RX ORDER — FENTANYL CITRATE 50 UG/ML
INJECTION, SOLUTION INTRAMUSCULAR; INTRAVENOUS
Status: DISPENSED
Start: 2024-06-05 | End: 2024-06-06

## 2024-06-05 RX ORDER — ONDANSETRON 4 MG/1
4 TABLET, ORALLY DISINTEGRATING ORAL EVERY 8 HOURS PRN
Status: DISCONTINUED | OUTPATIENT
Start: 2024-06-05 | End: 2024-06-11 | Stop reason: HOSPADM

## 2024-06-05 RX ORDER — LIDOCAINE HYDROCHLORIDE 20 MG/ML
INJECTION, SOLUTION INTRAVENOUS PRN
Status: DISCONTINUED | OUTPATIENT
Start: 2024-06-05 | End: 2024-06-05 | Stop reason: SDUPTHER

## 2024-06-05 RX ORDER — BUPIVACAINE HYDROCHLORIDE 5 MG/ML
INJECTION, SOLUTION EPIDURAL; INTRACAUDAL PRN
Status: DISCONTINUED | OUTPATIENT
Start: 2024-06-05 | End: 2024-06-05 | Stop reason: ALTCHOICE

## 2024-06-05 RX ADMIN — FENTANYL CITRATE 25 MCG: 50 INJECTION, SOLUTION INTRAMUSCULAR; INTRAVENOUS at 13:08

## 2024-06-05 RX ADMIN — HYDROMORPHONE HYDROCHLORIDE 0.25 MG: 1 INJECTION, SOLUTION INTRAMUSCULAR; INTRAVENOUS; SUBCUTANEOUS at 13:25

## 2024-06-05 RX ADMIN — BUDESONIDE 250 MCG: 0.25 SUSPENSION RESPIRATORY (INHALATION) at 04:56

## 2024-06-05 RX ADMIN — WATER 1000 MG: 1 INJECTION INTRAMUSCULAR; INTRAVENOUS; SUBCUTANEOUS at 11:20

## 2024-06-05 RX ADMIN — ARFORMOTEROL TARTRATE 15 MCG: 15 SOLUTION RESPIRATORY (INHALATION) at 18:20

## 2024-06-05 RX ADMIN — FENTANYL CITRATE 50 MCG: 50 INJECTION INTRAMUSCULAR; INTRAVENOUS at 13:34

## 2024-06-05 RX ADMIN — AMPICILLIN SODIUM AND SULBACTAM SODIUM 3000 MG: 2; 1 INJECTION, POWDER, FOR SOLUTION INTRAMUSCULAR; INTRAVENOUS at 20:40

## 2024-06-05 RX ADMIN — Medication 10 ML: at 11:21

## 2024-06-05 RX ADMIN — BUDESONIDE 250 MCG: 0.25 SUSPENSION RESPIRATORY (INHALATION) at 18:20

## 2024-06-05 RX ADMIN — PHENYLEPHRINE HYDROCHLORIDE 100 MCG: 10 INJECTION INTRAVENOUS at 13:11

## 2024-06-05 RX ADMIN — AMPICILLIN SODIUM AND SULBACTAM SODIUM 3000 MG: 2; 1 INJECTION, POWDER, FOR SOLUTION INTRAMUSCULAR; INTRAVENOUS at 16:27

## 2024-06-05 RX ADMIN — METOPROLOL TARTRATE 50 MG: 50 TABLET, FILM COATED ORAL at 20:37

## 2024-06-05 RX ADMIN — HYDROCODONE BITARTRATE AND ACETAMINOPHEN 1 TABLET: 5; 325 TABLET ORAL at 19:47

## 2024-06-05 RX ADMIN — PROPOFOL 30 MG: 10 INJECTION, EMULSION INTRAVENOUS at 13:04

## 2024-06-05 RX ADMIN — PROPOFOL 75 MCG/KG/MIN: 10 INJECTION, EMULSION INTRAVENOUS at 13:03

## 2024-06-05 RX ADMIN — METOPROLOL TARTRATE 50 MG: 50 TABLET, FILM COATED ORAL at 11:19

## 2024-06-05 RX ADMIN — DOCUSATE SODIUM 100 MG: 100 CAPSULE, LIQUID FILLED ORAL at 20:36

## 2024-06-05 RX ADMIN — DOXYCYCLINE HYCLATE 100 MG: 100 CAPSULE ORAL at 20:35

## 2024-06-05 RX ADMIN — ARFORMOTEROL TARTRATE 15 MCG: 15 SOLUTION RESPIRATORY (INHALATION) at 04:56

## 2024-06-05 RX ADMIN — SODIUM CHLORIDE, POTASSIUM CHLORIDE, SODIUM LACTATE AND CALCIUM CHLORIDE: 600; 310; 30; 20 INJECTION, SOLUTION INTRAVENOUS at 04:36

## 2024-06-05 RX ADMIN — SODIUM CHLORIDE, POTASSIUM CHLORIDE, SODIUM LACTATE AND CALCIUM CHLORIDE: 600; 310; 30; 20 INJECTION, SOLUTION INTRAVENOUS at 19:43

## 2024-06-05 RX ADMIN — LIDOCAINE HYDROCHLORIDE 40 MG: 20 INJECTION, SOLUTION INTRAVENOUS at 13:03

## 2024-06-05 RX ADMIN — FENTANYL CITRATE 25 MCG: 50 INJECTION, SOLUTION INTRAMUSCULAR; INTRAVENOUS at 13:03

## 2024-06-05 ASSESSMENT — PAIN DESCRIPTION - LOCATION
LOCATION: LEG

## 2024-06-05 ASSESSMENT — PAIN DESCRIPTION - DESCRIPTORS
DESCRIPTORS: ACHING;DISCOMFORT
DESCRIPTORS: ACHING;DISCOMFORT
DESCRIPTORS: ACHING;DISCOMFORT;SHOOTING;THROBBING
DESCRIPTORS: ACHING;DISCOMFORT

## 2024-06-05 ASSESSMENT — PAIN DESCRIPTION - ORIENTATION
ORIENTATION: RIGHT

## 2024-06-05 ASSESSMENT — PAIN DESCRIPTION - ONSET
ONSET: ON-GOING

## 2024-06-05 ASSESSMENT — PAIN SCALES - GENERAL
PAINLEVEL_OUTOF10: 0
PAINLEVEL_OUTOF10: 0
PAINLEVEL_OUTOF10: 9
PAINLEVEL_OUTOF10: 6
PAINLEVEL_OUTOF10: 7
PAINLEVEL_OUTOF10: 3
PAINLEVEL_OUTOF10: 0

## 2024-06-05 ASSESSMENT — PAIN DESCRIPTION - PAIN TYPE
TYPE: ACUTE PAIN;SURGICAL PAIN

## 2024-06-05 ASSESSMENT — PAIN DESCRIPTION - FREQUENCY
FREQUENCY: CONTINUOUS

## 2024-06-05 NOTE — CONSULTS
[address] [place] [address] [address] [place] [address]  Phone [phone]   [phone]      Admit Date: 6/3/2024  5:21 PM  Pt Name: Shauna Gaines  MRN: [id number]  : 1935  Reason for Consult:    Chief Complaint   Patient presents with    Wound Check     Sent from admission by wound care clinic     Requesting Physician:  Austen Noguera DO  PCP: Ross Mercer MD  History Obtained From:  patient, chart   ID consulted for Cellulitis [L03.90]  Cellulitis of right lower extremity [L03.115]  Wound of right lower extremity, initial encounter [S81.056A]  on hospital day 2  Face to face encounter   CHIEF COMPLAINT       Chief Complaint   Patient presents with    Wound Check     Sent from admission by wound care clinic     HISTORYOF PRESENT ILLNESS   Shauna Gaines is a 89 y.o. female who  has a past medical history of Aortic valve stenosis, Asthma, COPD (chronic obstructive pulmonary disease) ([facility]), COVID, Hearing aid worn, Heart murmur, Hyperlipidemia, and Hypertension.   Presented to ED TRIAGE VITALS  BP: 135/63, Temp: 98.7 °F (37.1 °C), Pulse: 86, Respirations: 18, SpO2: 98 %  HPI:  ID was consulted on 24 for infection management  Pt presented to ER on 6/3/2024 with diagnosis of  Cellulitis [L03.90]  Cellulitis of right lower extremity [L03.115]  Wound of right lower extremity, initial encounter [S81.468A]     Patient was admitted to hospital from wound care center for right leg wound/ cellulitis. Patient is awake in bed with daughters at bedside. Her daughter reported that her dog ran into the patient's leg the week prior and they were managing it at home.  She is on blood thinners.  She denies any fever or chills at home.  She went to urgent care and started taking doxycycline last Thursday.  The patient then went to wound care and was sent to ER for further management.   She was started on IV antibiotics.   Labs reviewed. Cultures are pending   Podiatry is planning for surgery today . 
Ross, DO         Current Outpatient Medications   Medication Sig Dispense Refill    metoprolol tartrate (LOPRESSOR) 50 MG tablet Take 1 tablet by mouth 2 times daily      doxycycline hyclate (VIBRA-TABS) 100 MG tablet Take 1 tablet by mouth 2 times daily for 10 days (Patient not taking: Reported on 6/3/2024) 20 tablet 0    ELIQUIS 2.5 MG TABS tablet Take 1 tablet by mouth 2 times daily 60 tablet 2    potassium chloride (KLOR-CON M) 20 MEQ extended release tablet Take 1 tablet by mouth daily 30 tablet 5    losartan (COZAAR) 25 MG tablet Take 1 tablet by mouth daily (Patient not taking: Reported on 4/18/2024)      carvedilol (COREG) 6.25 MG tablet Take 1 tablet by mouth 2 times daily 180 tablet 3    SYMBICORT 80-4.5 MCG/ACT AERO Inhale 2 puffs into the lungs in the morning and 2 puffs in the evening.      acetaminophen-codeine (TYLENOL #3) 300-30 MG per tablet Take 1 tablet by mouth as needed.      furosemide (LASIX) 20 MG tablet Take 1 tablet by mouth daily      formoterol (PERFOROMIST) 20 MCG/2ML nebulizer solution Take 20 mcg by nebulization in the morning and at bedtime (Patient not taking: Reported on 7/21/2023)      simvastatin (ZOCOR) 20 MG tablet Take 1 tablet by mouth nightly      cloNIDine (CATAPRES) 0.1 MG tablet Take 1 tablet by mouth daily as needed (Patient not taking: Reported on 6/3/2024)      fluticasone-vilanterol (BREO ELLIPTA) 100-25 MCG/INH AEPB inhaler Inhale into the lungs daily      Cholecalciferol (VITAMIN D3) 50 MCG (2000 UT) CAPS Take by mouth daily      Multiple Vitamins-Minerals (PROSIGHT) TABS Take 1 tablet by mouth daily (Patient not taking: Reported on 4/18/2024)      Probiotic Product (PROBIOTIC-10 PO) Take by mouth daily      olmesartan-amLODIPine-HCTZ 40-10-12.5 MG TABS Take 20 mg by mouth daily (Patient not taking: Reported on 6/3/2024)      albuterol (PROVENTIL) (2.5 MG/3ML) 0.083% nebulizer solution Take 3 mLs by nebulization in the morning and at bedtime          Lab Results 
total time involved with the encounter and 100% of assessment and recommendations.  The total time included the following:  Independently interviewing the patient (HPI, ROS, PMH, PSH, FMH, SH, allergies, and medications)  Reviewing available records, results of previously ordered testing/procedures, and current problem list  Independently performing a medically appropriate examination  Reviewing the above documentation completed by the EDIN  Ordering medications, tests, and/or procedures as required  Formulating the assessment/plan and reviewing the rationale for the above recommendations  Counseling/educating the patient  Coordinating care with other healthcare professionals  Communicating results to the patient's family/caregiver as available  Documenting clinical information in the patient's electronic health record      I independently interviewed and examined the patient. I have reviewed the above documentation completed by the EDIN. Please see my additional contributions to the HPI, physical exam, and assessment / medical decision making.  89-year-old female with history of paroxysmal atrial fibrillation and sick sinus syndrome status post permanent pacemaker as well as moderate aortic stenosis and severe tract regurgitation and other comorbidities as listed in the assessment and past medical portion of this note who is hospitalized for nonhealing wound now requiring podiatry surgery and cardiology consulted for preoperative cardiovascular evaluation.  Patient report has been stable and denies chest pain or dyspnea on exertion except a dog scratch his foods with subsequent nonhealing wound and now requiring surgery after failed antibiotics.  She had a stress test done less than a year ago which revealed no ischemia and per chart record patient had recent echocardiogram in March 2024 revealing EF of 60% and moderate aortic stenosis.        Review of Systems:  Cardiac: As per HPI  General: No fever,

## 2024-06-05 NOTE — OP NOTE
[address] [address]                            OPERATIVE REPORT      PATIENT NAME: MARLA THOMPSON                 : 1935  MED REC NO: [id number]                        ROOM: [id number]  ACCOUNT NO: [id number]                       ADMIT DATE: 2024  PROVIDER: Jared Dexter DPM      DATE OF PROCEDURE:      SURGEON:  Jared Dexter DPM    PREOPERATIVE DIAGNOSES:    1. Right leg cellulitis with abscess.  2. Ulceration of right leg with necrosis of muscle.  3. Peripheral arterial disease.    POSTOPERATIVE DIAGNOSES:    1. Right leg cellulitis with abscess.  2. Ulceration of right leg with necrosis of muscle.  3. Peripheral arterial disease.    PROCEDURES:    1. Incision and drainage of right leg abscess.  2. Excisional wound debridement, right leg, to and through level of the deep fascia and muscle, total measurement was 7 cm x 4 cm x 0.5 cm in dimension.  3. Application of wound VAC negative pressure therapy.    ANESTHESIA:  Monitored anesthesia care.    INJECTABLES:  20 mL of 0.5% Marcaine plain.    COMPLICATIONS:  None.    HEMOSTASIS:  On the field.    ESTIMATED BLOOD LOSS:  Minimal.    INTRAOPERATIVE FINDINGS:  Necrosis, soft tissue.  Over 5 mL of seropurulent abscess evacuated.    INDICATIONS:  This is an 89-year-old female who presents today for right leg wound debridement.  I counseled the patient on the nature of the problem, proposed course of procedure, potential benefits, risks, complications, and convalescence in detail.  All questions were answered to the patient's satisfaction.  No guarantees given as to the outcome of procedure.    DESCRIPTION OF PROCEDURE:  Under mild sedation, the patient was brought to the operating room and was laid on the table in supine position.  The right lower extremity was scrubbed, prepped, and draped in usual sterile fashion.  At this time, using a #10 blade, I performed a linear incision

## 2024-06-05 NOTE — CARE COORDINATION
6/5/2024 1314 CM note: Pt resides with her dtr Luzmaria in a 2 story home, 3 BRY, bedroom on 2nd level. DME includes: cane,ww, shower chair, nebulizer and home o2@2L from [employer]. Pt is for RLE I/D and possible wound vac today. CM spoke with pt and her 2 dtrs at bedside this am and they request [facility] at d/c and prefer 1)[place]-per liaison, no AR criteria  2)[facility] [place]-per liaison Maureen, will follow for abx needs/acceptance. Family reviewing SNF list for other preferences if needed. For SNF, will need acceptance, [facility], [facility] and signed STACEY. (IF pt would be able to return home, pt/family prefer [facility]).  CM will follow for wound care/abx needs and SNF acceptance. Elio OSCAR

## 2024-06-05 NOTE — BRIEF OP NOTE
Brief Postoperative Note      Patient: Shauna Gaines  YOB: 1935  MRN: [id number]    Date of Procedure: 6/5/2024    Pre-Op Diagnosis Codes:     * Cellulitis, unspecified cellulitis site [L03.90]    Post-Op Diagnosis: Same       Procedure(s):  RIGHT LEG INCISION AND DRAINAGE POSSIBLE WOUND VAC APPLICATION    Surgeon(s):  Jared Dexter DPM Patel, Neathie, DPM    Assistant:  * No surgical staff found *    Anesthesia: General    Estimated Blood Loss (mL): Minimal    Complications: None    Specimens:   ID Type Source Tests Collected by Time Destination   1 : Tissue culture right leg Tissue Tissue CULTURE, ANAEROBIC, CULTURE, FUNGUS, GRAM STAIN, CULTURE, SURGICAL Jared Dexter DPM 6/5/2024 1312        Implants:  * No implants in log *      Drains: * No LDAs found *    Findings:  Infection Present At Time Of Surgery (PATOS) (choose all levels that have infection present):  - Superficial Infection (skin/subcutaneous) present as evidenced by abscess  Other Findings: necrosis of soft tissue.  Over 5 cc seropurulent abscess evacauted    Electronically signed by Jared Dexter DPM on 6/5/2024 at 1:15 PM

## 2024-06-05 NOTE — ANESTHESIA POSTPROCEDURE EVALUATION
Department of Anesthesiology  Postprocedure Note    Patient: Shauna Gaines  MRN: [id number]  YOB: 1935  Date of evaluation: 6/5/2024    Procedure Summary       Date: 06/05/24 Room / Location: [address]    Anesthesia Start: 1258 Anesthesia Stop: 1327    Procedure: RIGHT LEG INCISION AND DRAINAGE POSSIBLE WOUND VAC APPLICATION (Right: Leg Lower) Diagnosis:       Cellulitis, unspecified cellulitis site      (Cellulitis, unspecified cellulitis site [L03.90])    Surgeons: Jared Dexter DPM Responsible Provider: Matthew Powell MD    Anesthesia Type: MAC ASA Status: 3            Anesthesia Type: No value filed.    Cata Phase I: Cata Score: 9    Cata Phase II:      Anesthesia Post Evaluation    Patient location during evaluation: PACU  Patient participation: complete - patient cannot participate  Level of consciousness: awake  Airway patency: patent  Nausea & Vomiting: no nausea  Cardiovascular status: blood pressure returned to baseline  Respiratory status: acceptable  Hydration status: euvolemic  Pain management: adequate    No notable events documented.

## 2024-06-05 NOTE — PLAN OF CARE
Problem: Safety - Adult  Goal: Free from fall injury  6/5/2024 0055 by Emmy King RN  Outcome: Progressing     Problem: Pain  Goal: Verbalizes/displays adequate comfort level or baseline comfort level  6/5/2024 0055 by Emmy King RN  Outcome: Progressing     Problem: ABCDS Injury Assessment  Goal: Absence of physical injury  6/5/2024 0055 by Emmy King RN  Outcome: Progressing

## 2024-06-05 NOTE — PLAN OF CARE
Problem: Safety - Adult  Goal: Free from fall injury  Outcome: Progressing     Problem: Pain  Goal: Verbalizes/displays adequate comfort level or baseline comfort level  Outcome: Progressing     Problem: ABCDS Injury Assessment  Goal: Absence of physical injury  Outcome: Progressing     Problem: Discharge Planning  Goal: Discharge to home or other facility with appropriate resources  Outcome: Progressing

## 2024-06-06 LAB
ALBUMIN SERPL-MCNC: 3 G/DL (ref 3.5–5.2)
ALP SERPL-CCNC: 77 U/L (ref 35–104)
ALT SERPL-CCNC: <5 U/L (ref 0–32)
ANION GAP SERPL CALCULATED.3IONS-SCNC: 5 MMOL/L (ref 7–16)
AST SERPL-CCNC: 16 U/L (ref 0–31)
BASOPHILS # BLD: 0.04 K/UL (ref 0–0.2)
BASOPHILS NFR BLD: 1 % (ref 0–2)
BILIRUB SERPL-MCNC: 0.4 MG/DL (ref 0–1.2)
BUN SERPL-MCNC: 10 MG/DL (ref 6–23)
CALCIUM SERPL-MCNC: 8.8 MG/DL (ref 8.6–10.2)
CHLORIDE SERPL-SCNC: 101 MMOL/L (ref 98–107)
CO2 SERPL-SCNC: 32 MMOL/L (ref 22–29)
CREAT SERPL-MCNC: 0.6 MG/DL (ref 0.5–1)
CRP SERPL HS-MCNC: 13 MG/L (ref 0–5)
EKG ATRIAL RATE: 118 BPM
EKG P AXIS: 68 DEGREES
EKG P-R INTERVAL: 162 MS
EKG Q-T INTERVAL: 332 MS
EKG QRS DURATION: 68 MS
EKG QTC CALCULATION (BAZETT): 465 MS
EKG R AXIS: 5 DEGREES
EKG T AXIS: 18 DEGREES
EKG VENTRICULAR RATE: 118 BPM
EOSINOPHIL # BLD: 0.27 K/UL (ref 0.05–0.5)
EOSINOPHILS RELATIVE PERCENT: 5 % (ref 0–6)
ERYTHROCYTE [DISTWIDTH] IN BLOOD BY AUTOMATED COUNT: 13.1 % (ref 11.5–15)
GFR, ESTIMATED: 86 ML/MIN/1.73M2
GLUCOSE SERPL-MCNC: 101 MG/DL (ref 74–99)
HCT VFR BLD AUTO: 34.5 % (ref 34–48)
HGB BLD-MCNC: 10.5 G/DL (ref 11.5–15.5)
IMM GRANULOCYTES # BLD AUTO: <0.03 K/UL (ref 0–0.58)
IMM GRANULOCYTES NFR BLD: 0 % (ref 0–5)
LYMPHOCYTES NFR BLD: 1.35 K/UL (ref 1.5–4)
LYMPHOCYTES RELATIVE PERCENT: 24 % (ref 20–42)
MAGNESIUM SERPL-MCNC: 1.7 MG/DL (ref 1.6–2.6)
MCH RBC QN AUTO: 29.2 PG (ref 26–35)
MCHC RBC AUTO-ENTMCNC: 30.4 G/DL (ref 32–34.5)
MCV RBC AUTO: 95.8 FL (ref 80–99.9)
MONOCYTES NFR BLD: 0.65 K/UL (ref 0.1–0.95)
MONOCYTES NFR BLD: 12 % (ref 2–12)
NEUTROPHILS NFR BLD: 58 % (ref 43–80)
NEUTS SEG NFR BLD: 3.21 K/UL (ref 1.8–7.3)
PHOSPHATE SERPL-MCNC: 2.9 MG/DL (ref 2.5–4.5)
PLATELET # BLD AUTO: 163 K/UL (ref 130–450)
PMV BLD AUTO: 10.6 FL (ref 7–12)
POTASSIUM SERPL-SCNC: 3.8 MMOL/L (ref 3.5–5)
PROT SERPL-MCNC: 5.4 G/DL (ref 6.4–8.3)
RBC # BLD AUTO: 3.6 M/UL (ref 3.5–5.5)
SODIUM SERPL-SCNC: 138 MMOL/L (ref 132–146)
WBC OTHER # BLD: 5.5 K/UL (ref 4.5–11.5)

## 2024-06-06 PROCEDURE — 6360000002 HC RX W HCPCS

## 2024-06-06 PROCEDURE — 6370000000 HC RX 637 (ALT 250 FOR IP)

## 2024-06-06 PROCEDURE — 94640 AIRWAY INHALATION TREATMENT: CPT

## 2024-06-06 PROCEDURE — 97164 PT RE-EVAL EST PLAN CARE: CPT | Performed by: PHYSICAL THERAPIST

## 2024-06-06 PROCEDURE — 83735 ASSAY OF MAGNESIUM: CPT

## 2024-06-06 PROCEDURE — 6370000000 HC RX 637 (ALT 250 FOR IP): Performed by: NURSE PRACTITIONER

## 2024-06-06 PROCEDURE — 6370000000 HC RX 637 (ALT 250 FOR IP): Performed by: CLINICAL NURSE SPECIALIST

## 2024-06-06 PROCEDURE — 87081 CULTURE SCREEN ONLY: CPT

## 2024-06-06 PROCEDURE — 85025 COMPLETE CBC W/AUTO DIFF WBC: CPT

## 2024-06-06 PROCEDURE — 99233 SBSQ HOSP IP/OBS HIGH 50: CPT | Performed by: INTERNAL MEDICINE

## 2024-06-06 PROCEDURE — 80053 COMPREHEN METABOLIC PANEL: CPT

## 2024-06-06 PROCEDURE — 97530 THERAPEUTIC ACTIVITIES: CPT | Performed by: PHYSICAL THERAPIST

## 2024-06-06 PROCEDURE — 84100 ASSAY OF PHOSPHORUS: CPT

## 2024-06-06 PROCEDURE — 1200000000 HC SEMI PRIVATE

## 2024-06-06 PROCEDURE — 36415 COLL VENOUS BLD VENIPUNCTURE: CPT

## 2024-06-06 PROCEDURE — 6360000002 HC RX W HCPCS: Performed by: CLINICAL NURSE SPECIALIST

## 2024-06-06 PROCEDURE — 97116 GAIT TRAINING THERAPY: CPT | Performed by: PHYSICAL THERAPIST

## 2024-06-06 PROCEDURE — 2580000003 HC RX 258

## 2024-06-06 PROCEDURE — 2580000003 HC RX 258: Performed by: CLINICAL NURSE SPECIALIST

## 2024-06-06 PROCEDURE — 6370000000 HC RX 637 (ALT 250 FOR IP): Performed by: INTERNAL MEDICINE

## 2024-06-06 PROCEDURE — 2580000003 HC RX 258: Performed by: NURSE PRACTITIONER

## 2024-06-06 RX ORDER — HYDROCODONE BITARTRATE AND ACETAMINOPHEN 5; 325 MG/1; MG/1
1 TABLET ORAL EVERY 4 HOURS PRN
Status: DISCONTINUED | OUTPATIENT
Start: 2024-06-06 | End: 2024-06-11 | Stop reason: HOSPADM

## 2024-06-06 RX ADMIN — DOCUSATE SODIUM 100 MG: 100 CAPSULE, LIQUID FILLED ORAL at 20:51

## 2024-06-06 RX ADMIN — ARFORMOTEROL TARTRATE 15 MCG: 15 SOLUTION RESPIRATORY (INHALATION) at 06:47

## 2024-06-06 RX ADMIN — AMPICILLIN SODIUM AND SULBACTAM SODIUM 3000 MG: 2; 1 INJECTION, POWDER, FOR SOLUTION INTRAMUSCULAR; INTRAVENOUS at 01:45

## 2024-06-06 RX ADMIN — ARFORMOTEROL TARTRATE 15 MCG: 15 SOLUTION RESPIRATORY (INHALATION) at 19:08

## 2024-06-06 RX ADMIN — Medication 10 ML: at 20:51

## 2024-06-06 RX ADMIN — METOPROLOL TARTRATE 50 MG: 50 TABLET, FILM COATED ORAL at 09:35

## 2024-06-06 RX ADMIN — HYDROCODONE BITARTRATE AND ACETAMINOPHEN 1 TABLET: 5; 325 TABLET ORAL at 15:57

## 2024-06-06 RX ADMIN — SODIUM CHLORIDE, POTASSIUM CHLORIDE, SODIUM LACTATE AND CALCIUM CHLORIDE: 600; 310; 30; 20 INJECTION, SOLUTION INTRAVENOUS at 16:02

## 2024-06-06 RX ADMIN — AMPICILLIN SODIUM AND SULBACTAM SODIUM 3000 MG: 2; 1 INJECTION, POWDER, FOR SOLUTION INTRAMUSCULAR; INTRAVENOUS at 09:35

## 2024-06-06 RX ADMIN — BUDESONIDE 250 MCG: 0.25 SUSPENSION RESPIRATORY (INHALATION) at 06:47

## 2024-06-06 RX ADMIN — AMPICILLIN SODIUM AND SULBACTAM SODIUM 3000 MG: 2; 1 INJECTION, POWDER, FOR SOLUTION INTRAMUSCULAR; INTRAVENOUS at 16:03

## 2024-06-06 RX ADMIN — DOXYCYCLINE HYCLATE 100 MG: 100 CAPSULE ORAL at 09:35

## 2024-06-06 RX ADMIN — AMPICILLIN SODIUM AND SULBACTAM SODIUM 3000 MG: 2; 1 INJECTION, POWDER, FOR SOLUTION INTRAMUSCULAR; INTRAVENOUS at 20:53

## 2024-06-06 RX ADMIN — HYDROCODONE BITARTRATE AND ACETAMINOPHEN 1 TABLET: 5; 325 TABLET ORAL at 05:54

## 2024-06-06 RX ADMIN — METOPROLOL TARTRATE 50 MG: 50 TABLET, FILM COATED ORAL at 20:51

## 2024-06-06 RX ADMIN — HYDROCODONE BITARTRATE AND ACETAMINOPHEN 1 TABLET: 5; 325 TABLET ORAL at 00:52

## 2024-06-06 RX ADMIN — HYDROCODONE BITARTRATE AND ACETAMINOPHEN 1 TABLET: 5; 325 TABLET ORAL at 20:50

## 2024-06-06 RX ADMIN — DOXYCYCLINE HYCLATE 100 MG: 100 CAPSULE ORAL at 20:50

## 2024-06-06 RX ADMIN — ONDANSETRON 4 MG: 2 INJECTION INTRAMUSCULAR; INTRAVENOUS at 21:22

## 2024-06-06 RX ADMIN — HYDROCODONE BITARTRATE AND ACETAMINOPHEN 1 TABLET: 5; 325 TABLET ORAL at 11:24

## 2024-06-06 RX ADMIN — APIXABAN 2.5 MG: 2.5 TABLET, FILM COATED ORAL at 09:35

## 2024-06-06 RX ADMIN — ATORVASTATIN CALCIUM 10 MG: 10 TABLET, FILM COATED ORAL at 09:35

## 2024-06-06 RX ADMIN — APIXABAN 2.5 MG: 2.5 TABLET, FILM COATED ORAL at 20:50

## 2024-06-06 RX ADMIN — BUDESONIDE 250 MCG: 0.25 SUSPENSION RESPIRATORY (INHALATION) at 19:08

## 2024-06-06 ASSESSMENT — PAIN DESCRIPTION - FREQUENCY
FREQUENCY: CONTINUOUS
FREQUENCY: CONTINUOUS

## 2024-06-06 ASSESSMENT — PAIN DESCRIPTION - ONSET
ONSET: ON-GOING
ONSET: ON-GOING

## 2024-06-06 ASSESSMENT — PAIN DESCRIPTION - DESCRIPTORS
DESCRIPTORS: THROBBING;SHOOTING;DISCOMFORT
DESCRIPTORS: THROBBING;SHOOTING;DISCOMFORT
DESCRIPTORS: THROBBING

## 2024-06-06 ASSESSMENT — PAIN SCALES - GENERAL
PAINLEVEL_OUTOF10: 7
PAINLEVEL_OUTOF10: 6
PAINLEVEL_OUTOF10: 8
PAINLEVEL_OUTOF10: 2
PAINLEVEL_OUTOF10: 8

## 2024-06-06 ASSESSMENT — PAIN DESCRIPTION - LOCATION
LOCATION: LEG

## 2024-06-06 ASSESSMENT — PAIN DESCRIPTION - ORIENTATION
ORIENTATION: RIGHT

## 2024-06-06 ASSESSMENT — PAIN DESCRIPTION - PAIN TYPE
TYPE: ACUTE PAIN;SURGICAL PAIN

## 2024-06-06 NOTE — DISCHARGE INSTR - COC
Continuity of Care Form    Patient Name: Shauna Gaines   :  1935  MRN:  [id number]    Admit date:  6/3/2024  Discharge date:  ***    Code Status Order: Limited   Advance Directives:   Advance Care Flowsheet Documentation       Date/Time Healthcare Directive Type of Healthcare Directive Copy in Chart Healthcare Agent Appointed Healthcare Agent's Name Healthcare Agent's Phone Number    24 [phone] Yes, patient has an advance directive for healthcare treatment  not at hospital. Daughter left it at home Health care treatment directive No, copy requested from family --  daughter -- --            Admitting Physician:  Austen Noguera DO  PCP: Ross Mercer MD    Discharging Nurse: ***  Discharging Hospital Unit/Room#: 0437/0437-02  Discharging Unit Phone Number: ***    Emergency Contact:   Extended Emergency Contact Information  Primary Emergency Contact: MireilleLuzmaria davenport  Mobile Phone: [phone]  Relation: Child  Secondary Emergency Contact: Irena Edouard  Home Phone: [phone]  Mobile Phone: [phone]  Relation: Child    Past Surgical History:  Past Surgical History:   Procedure Laterality Date    APPENDECTOMY      BACK SURGERY      x3    CHOLECYSTECTOMY      HERNIA REPAIR      HIP ARTHROPLASTY Bilateral     LEG SURGERY Right 2024    RIGHT LEG INCISION AND DRAINAGE POSSIBLE WOUND VAC APPLICATION performed by Jared Dexter DPM at [facility] OR    PACEMAKER INSERTION  2024    TONSILLECTOMY         Immunization History:     There is no immunization history on file for this patient.    Active Problems:  Patient Active Problem List   Diagnosis Code    Choledocholithiasis K80.50    Dilated cbd, acquired K83.8    Cellulitis of right leg L03.115    Cellulitis L03.90    Wound of right leg S81.801A       Isolation/Infection:   Isolation            No Isolation          Patient Infection Status       None to display                     Nurse Assessment:  Last Vital Signs: /60   Pulse 76   Temp 98.4

## 2024-06-06 NOTE — PLAN OF CARE
Problem: Safety - Adult  Goal: Free from fall injury  6/5/2024 2250 by Emmy King RN  Outcome: Progressing     Problem: Pain  Goal: Verbalizes/displays adequate comfort level or baseline comfort level  6/5/2024 2250 by Emmy King RN  Outcome: Progressing     Problem: ABCDS Injury Assessment  Goal: Absence of physical injury  6/5/2024 2250 by Emmy King RN  Outcome: Progressing     Problem: Discharge Planning  Goal: Discharge to home or other facility with appropriate resources  6/5/2024 2250 by Emmy King RN  Outcome: Progressing     Problem: Skin/Tissue Integrity  Goal: Absence of new skin breakdown  Description: 1.  Monitor for areas of redness and/or skin breakdown  2.  Assess vascular access sites hourly  3.  Every 4-6 hours minimum:  Change oxygen saturation probe site  4.  Every 4-6 hours:  If on nasal continuous positive airway pressure, respiratory therapy assess nares and determine need for appliance change or resting period.  Outcome: Progressing

## 2024-06-06 NOTE — CARE COORDINATION
6/6/2024 1303 CM note: Pt resides with her dtr Luzmaria and plans to d/c to [facility]. Pt wears o2@ 2L NC chronically. She had RLE I/D and wound vac applied on 6/5/24 and is on bedrest until 3:30 today. Per ID, anticipating pt to d/c on po [facility]ox. [facility] facility can accept pt if she discharges on po zyvox. IF iv abx are needed at d/c, [facility] would have to re-review for acceptance. Pt/dtr informed and prefer [facility] as back up plan if needed. (Per liaison Michelle, [facility] can accept pt if needed.)  For SNF, will need updated therapy notes, ZAIN GARZA(initiated) and signed STACEY. CM will follow for  wound care/abx orders. Alfonso

## 2024-06-07 LAB
ALBUMIN SERPL-MCNC: 2.9 G/DL (ref 3.5–5.2)
ALP SERPL-CCNC: 79 U/L (ref 35–104)
ALT SERPL-CCNC: <5 U/L (ref 0–32)
ANION GAP SERPL CALCULATED.3IONS-SCNC: 7 MMOL/L (ref 7–16)
AST SERPL-CCNC: 16 U/L (ref 0–31)
BASOPHILS # BLD: 0.05 K/UL (ref 0–0.2)
BASOPHILS NFR BLD: 1 % (ref 0–2)
BILIRUB SERPL-MCNC: 0.3 MG/DL (ref 0–1.2)
BUN SERPL-MCNC: 8 MG/DL (ref 6–23)
CALCIUM SERPL-MCNC: 9.2 MG/DL (ref 8.6–10.2)
CHLORIDE SERPL-SCNC: 104 MMOL/L (ref 98–107)
CO2 SERPL-SCNC: 32 MMOL/L (ref 22–29)
CREAT SERPL-MCNC: 0.7 MG/DL (ref 0.5–1)
CRP SERPL HS-MCNC: 9 MG/L (ref 0–5)
EOSINOPHIL # BLD: 0.4 K/UL (ref 0.05–0.5)
EOSINOPHILS RELATIVE PERCENT: 6 % (ref 0–6)
ERYTHROCYTE [DISTWIDTH] IN BLOOD BY AUTOMATED COUNT: 13.2 % (ref 11.5–15)
ERYTHROCYTE [SEDIMENTATION RATE] IN BLOOD BY WESTERGREN METHOD: 11 MM/HR (ref 0–20)
GFR, ESTIMATED: 83 ML/MIN/1.73M2
GLUCOSE SERPL-MCNC: 101 MG/DL (ref 74–99)
HCT VFR BLD AUTO: 37.3 % (ref 34–48)
HGB BLD-MCNC: 11.2 G/DL (ref 11.5–15.5)
IMM GRANULOCYTES # BLD AUTO: <0.03 K/UL (ref 0–0.58)
IMM GRANULOCYTES NFR BLD: 0 % (ref 0–5)
LYMPHOCYTES NFR BLD: 1.66 K/UL (ref 1.5–4)
LYMPHOCYTES RELATIVE PERCENT: 26 % (ref 20–42)
MAGNESIUM SERPL-MCNC: 1.6 MG/DL (ref 1.6–2.6)
MCH RBC QN AUTO: 29.4 PG (ref 26–35)
MCHC RBC AUTO-ENTMCNC: 30 G/DL (ref 32–34.5)
MCV RBC AUTO: 97.9 FL (ref 80–99.9)
MICROORGANISM SPEC CULT: ABNORMAL
MICROORGANISM SPEC CULT: ABNORMAL
MICROORGANISM SPEC CULT: NORMAL
MICROORGANISM/AGENT SPEC: ABNORMAL
MONOCYTES NFR BLD: 0.74 K/UL (ref 0.1–0.95)
MONOCYTES NFR BLD: 11 % (ref 2–12)
NEUTROPHILS NFR BLD: 56 % (ref 43–80)
NEUTS SEG NFR BLD: 3.64 K/UL (ref 1.8–7.3)
PHOSPHATE SERPL-MCNC: 3.2 MG/DL (ref 2.5–4.5)
PLATELET # BLD AUTO: 171 K/UL (ref 130–450)
PMV BLD AUTO: 10.7 FL (ref 7–12)
POTASSIUM SERPL-SCNC: 3.9 MMOL/L (ref 3.5–5)
PROT SERPL-MCNC: 5.4 G/DL (ref 6.4–8.3)
RBC # BLD AUTO: 3.81 M/UL (ref 3.5–5.5)
SERVICE CMNT-IMP: NORMAL
SODIUM SERPL-SCNC: 143 MMOL/L (ref 132–146)
SPECIMEN DESCRIPTION: ABNORMAL
SPECIMEN DESCRIPTION: NORMAL
WBC OTHER # BLD: 6.5 K/UL (ref 4.5–11.5)

## 2024-06-07 PROCEDURE — 86140 C-REACTIVE PROTEIN: CPT

## 2024-06-07 PROCEDURE — 84100 ASSAY OF PHOSPHORUS: CPT

## 2024-06-07 PROCEDURE — 6360000002 HC RX W HCPCS: Performed by: CLINICAL NURSE SPECIALIST

## 2024-06-07 PROCEDURE — 80053 COMPREHEN METABOLIC PANEL: CPT

## 2024-06-07 PROCEDURE — 94640 AIRWAY INHALATION TREATMENT: CPT

## 2024-06-07 PROCEDURE — 85652 RBC SED RATE AUTOMATED: CPT

## 2024-06-07 PROCEDURE — 2580000003 HC RX 258

## 2024-06-07 PROCEDURE — 6370000000 HC RX 637 (ALT 250 FOR IP): Performed by: CLINICAL NURSE SPECIALIST

## 2024-06-07 PROCEDURE — 2580000003 HC RX 258: Performed by: CLINICAL NURSE SPECIALIST

## 2024-06-07 PROCEDURE — 6370000000 HC RX 637 (ALT 250 FOR IP): Performed by: SPECIALIST

## 2024-06-07 PROCEDURE — 6370000000 HC RX 637 (ALT 250 FOR IP): Performed by: INTERNAL MEDICINE

## 2024-06-07 PROCEDURE — 99233 SBSQ HOSP IP/OBS HIGH 50: CPT | Performed by: INTERNAL MEDICINE

## 2024-06-07 PROCEDURE — 6370000000 HC RX 637 (ALT 250 FOR IP)

## 2024-06-07 PROCEDURE — 83735 ASSAY OF MAGNESIUM: CPT

## 2024-06-07 PROCEDURE — 85025 COMPLETE CBC W/AUTO DIFF WBC: CPT

## 2024-06-07 PROCEDURE — 6370000000 HC RX 637 (ALT 250 FOR IP): Performed by: NURSE PRACTITIONER

## 2024-06-07 PROCEDURE — 2700000000 HC OXYGEN THERAPY PER DAY

## 2024-06-07 PROCEDURE — 6360000002 HC RX W HCPCS

## 2024-06-07 PROCEDURE — 6360000002 HC RX W HCPCS: Performed by: PODIATRIST

## 2024-06-07 PROCEDURE — 36415 COLL VENOUS BLD VENIPUNCTURE: CPT

## 2024-06-07 PROCEDURE — 1200000000 HC SEMI PRIVATE

## 2024-06-07 RX ORDER — LINEZOLID 600 MG/1
600 TABLET, FILM COATED ORAL EVERY 12 HOURS SCHEDULED
Qty: 14 TABLET | Refills: 0 | Status: ON HOLD | OUTPATIENT
Start: 2024-06-07 | End: 2024-06-14

## 2024-06-07 RX ORDER — LINEZOLID 600 MG/1
600 TABLET, FILM COATED ORAL EVERY 12 HOURS SCHEDULED
Status: DISCONTINUED | OUTPATIENT
Start: 2024-06-07 | End: 2024-06-11 | Stop reason: HOSPADM

## 2024-06-07 RX ADMIN — Medication 10 ML: at 10:22

## 2024-06-07 RX ADMIN — HYDROCODONE BITARTRATE AND ACETAMINOPHEN 1 TABLET: 5; 325 TABLET ORAL at 06:59

## 2024-06-07 RX ADMIN — ATORVASTATIN CALCIUM 10 MG: 10 TABLET, FILM COATED ORAL at 10:22

## 2024-06-07 RX ADMIN — APIXABAN 2.5 MG: 2.5 TABLET, FILM COATED ORAL at 20:29

## 2024-06-07 RX ADMIN — HYDROMORPHONE HYDROCHLORIDE 0.25 MG: 1 INJECTION, SOLUTION INTRAMUSCULAR; INTRAVENOUS; SUBCUTANEOUS at 11:01

## 2024-06-07 RX ADMIN — METOPROLOL TARTRATE 50 MG: 50 TABLET, FILM COATED ORAL at 10:22

## 2024-06-07 RX ADMIN — HYDROCODONE BITARTRATE AND ACETAMINOPHEN 1 TABLET: 5; 325 TABLET ORAL at 20:28

## 2024-06-07 RX ADMIN — AMPICILLIN SODIUM AND SULBACTAM SODIUM 3000 MG: 2; 1 INJECTION, POWDER, FOR SOLUTION INTRAMUSCULAR; INTRAVENOUS at 10:00

## 2024-06-07 RX ADMIN — BUDESONIDE 250 MCG: 0.25 SUSPENSION RESPIRATORY (INHALATION) at 06:20

## 2024-06-07 RX ADMIN — HYDROCODONE BITARTRATE AND ACETAMINOPHEN 1 TABLET: 5; 325 TABLET ORAL at 13:30

## 2024-06-07 RX ADMIN — LINEZOLID 600 MG: 600 TABLET, FILM COATED ORAL at 20:30

## 2024-06-07 RX ADMIN — BUDESONIDE 250 MCG: 0.25 SUSPENSION RESPIRATORY (INHALATION) at 18:59

## 2024-06-07 RX ADMIN — AMPICILLIN SODIUM AND SULBACTAM SODIUM 3000 MG: 2; 1 INJECTION, POWDER, FOR SOLUTION INTRAMUSCULAR; INTRAVENOUS at 01:02

## 2024-06-07 RX ADMIN — DOCUSATE SODIUM 100 MG: 100 CAPSULE, LIQUID FILLED ORAL at 20:29

## 2024-06-07 RX ADMIN — APIXABAN 2.5 MG: 2.5 TABLET, FILM COATED ORAL at 10:21

## 2024-06-07 RX ADMIN — Medication 10 ML: at 20:32

## 2024-06-07 RX ADMIN — DOXYCYCLINE HYCLATE 100 MG: 100 CAPSULE ORAL at 10:22

## 2024-06-07 RX ADMIN — METOPROLOL TARTRATE 50 MG: 50 TABLET, FILM COATED ORAL at 20:28

## 2024-06-07 RX ADMIN — ARFORMOTEROL TARTRATE 15 MCG: 15 SOLUTION RESPIRATORY (INHALATION) at 18:59

## 2024-06-07 RX ADMIN — HYDROCODONE BITARTRATE AND ACETAMINOPHEN 1 TABLET: 5; 325 TABLET ORAL at 00:59

## 2024-06-07 RX ADMIN — ARFORMOTEROL TARTRATE 15 MCG: 15 SOLUTION RESPIRATORY (INHALATION) at 06:19

## 2024-06-07 ASSESSMENT — PAIN - FUNCTIONAL ASSESSMENT
PAIN_FUNCTIONAL_ASSESSMENT: PREVENTS OR INTERFERES WITH MANY ACTIVE NOT PASSIVE ACTIVITIES
PAIN_FUNCTIONAL_ASSESSMENT: PREVENTS OR INTERFERES WITH MANY ACTIVE NOT PASSIVE ACTIVITIES

## 2024-06-07 ASSESSMENT — PAIN DESCRIPTION - ORIENTATION
ORIENTATION: RIGHT

## 2024-06-07 ASSESSMENT — PAIN SCALES - GENERAL
PAINLEVEL_OUTOF10: 3
PAINLEVEL_OUTOF10: 7
PAINLEVEL_OUTOF10: 7
PAINLEVEL_OUTOF10: 8
PAINLEVEL_OUTOF10: 7
PAINLEVEL_OUTOF10: 9

## 2024-06-07 ASSESSMENT — PAIN DESCRIPTION - LOCATION
LOCATION: LEG

## 2024-06-07 ASSESSMENT — PAIN DESCRIPTION - DESCRIPTORS
DESCRIPTORS: THROBBING;STABBING;SORE
DESCRIPTORS: SHARP;BURNING;THROBBING
DESCRIPTORS: THROBBING
DESCRIPTORS: THROBBING

## 2024-06-07 NOTE — CARE COORDINATION
6-7-Cm note: pt was med rec'd Zyvox for dc , [facility] can accept, called liaison Rain and asked her to start PRECERT, left message on secure line. . Pt has a wound vac attached. Pt will need an updated OT note, and if here thru weekend will need updated PT note as well (put on weekend list, also called dept to see ) pt will need PRECERT, signed ZAIN IBARRA (initiated). Electronically signed by Yarelis Bell RN on 6/7/2024 at 4:39 PM

## 2024-06-08 LAB
ALBUMIN SERPL-MCNC: 2.8 G/DL (ref 3.5–5.2)
ALP SERPL-CCNC: 91 U/L (ref 35–104)
ALT SERPL-CCNC: <5 U/L (ref 0–32)
ANION GAP SERPL CALCULATED.3IONS-SCNC: 9 MMOL/L (ref 7–16)
AST SERPL-CCNC: 20 U/L (ref 0–31)
BASOPHILS # BLD: 0.03 K/UL (ref 0–0.2)
BASOPHILS NFR BLD: 0 % (ref 0–2)
BILIRUB SERPL-MCNC: 0.3 MG/DL (ref 0–1.2)
BUN SERPL-MCNC: 10 MG/DL (ref 6–23)
CALCIUM SERPL-MCNC: 8.7 MG/DL (ref 8.6–10.2)
CHLORIDE SERPL-SCNC: 101 MMOL/L (ref 98–107)
CO2 SERPL-SCNC: 26 MMOL/L (ref 22–29)
CREAT SERPL-MCNC: 0.6 MG/DL (ref 0.5–1)
EOSINOPHIL # BLD: 0.31 K/UL (ref 0.05–0.5)
EOSINOPHILS RELATIVE PERCENT: 4 % (ref 0–6)
ERYTHROCYTE [DISTWIDTH] IN BLOOD BY AUTOMATED COUNT: 13 % (ref 11.5–15)
GFR, ESTIMATED: 86 ML/MIN/1.73M2
GLUCOSE SERPL-MCNC: 83 MG/DL (ref 74–99)
HCT VFR BLD AUTO: 36.1 % (ref 34–48)
HGB BLD-MCNC: 11 G/DL (ref 11.5–15.5)
IMM GRANULOCYTES # BLD AUTO: <0.03 K/UL (ref 0–0.58)
IMM GRANULOCYTES NFR BLD: 0 % (ref 0–5)
LYMPHOCYTES NFR BLD: 2.15 K/UL (ref 1.5–4)
LYMPHOCYTES RELATIVE PERCENT: 31 % (ref 20–42)
MAGNESIUM SERPL-MCNC: 1.6 MG/DL (ref 1.6–2.6)
MCH RBC QN AUTO: 29.2 PG (ref 26–35)
MCHC RBC AUTO-ENTMCNC: 30.5 G/DL (ref 32–34.5)
MCV RBC AUTO: 95.8 FL (ref 80–99.9)
MICROORGANISM SPEC CULT: NORMAL
MICROORGANISM SPEC CULT: NORMAL
MONOCYTES NFR BLD: 0.8 K/UL (ref 0.1–0.95)
MONOCYTES NFR BLD: 11 % (ref 2–12)
NEUTROPHILS NFR BLD: 53 % (ref 43–80)
NEUTS SEG NFR BLD: 3.7 K/UL (ref 1.8–7.3)
PHOSPHATE SERPL-MCNC: 3.1 MG/DL (ref 2.5–4.5)
PLATELET # BLD AUTO: 162 K/UL (ref 130–450)
PMV BLD AUTO: 11 FL (ref 7–12)
POTASSIUM SERPL-SCNC: 4.3 MMOL/L (ref 3.5–5)
PROT SERPL-MCNC: 5.4 G/DL (ref 6.4–8.3)
RBC # BLD AUTO: 3.77 M/UL (ref 3.5–5.5)
SERVICE CMNT-IMP: NORMAL
SODIUM SERPL-SCNC: 136 MMOL/L (ref 132–146)
SPECIMEN DESCRIPTION: NORMAL
SPECIMEN DESCRIPTION: NORMAL
WBC OTHER # BLD: 7 K/UL (ref 4.5–11.5)

## 2024-06-08 PROCEDURE — 6370000000 HC RX 637 (ALT 250 FOR IP)

## 2024-06-08 PROCEDURE — 6370000000 HC RX 637 (ALT 250 FOR IP): Performed by: NURSE PRACTITIONER

## 2024-06-08 PROCEDURE — 2580000003 HC RX 258

## 2024-06-08 PROCEDURE — 2700000000 HC OXYGEN THERAPY PER DAY

## 2024-06-08 PROCEDURE — 6360000002 HC RX W HCPCS

## 2024-06-08 PROCEDURE — 85025 COMPLETE CBC W/AUTO DIFF WBC: CPT

## 2024-06-08 PROCEDURE — 1200000000 HC SEMI PRIVATE

## 2024-06-08 PROCEDURE — 36415 COLL VENOUS BLD VENIPUNCTURE: CPT

## 2024-06-08 PROCEDURE — 84100 ASSAY OF PHOSPHORUS: CPT

## 2024-06-08 PROCEDURE — 94640 AIRWAY INHALATION TREATMENT: CPT

## 2024-06-08 PROCEDURE — 83735 ASSAY OF MAGNESIUM: CPT

## 2024-06-08 PROCEDURE — 6370000000 HC RX 637 (ALT 250 FOR IP): Performed by: INTERNAL MEDICINE

## 2024-06-08 PROCEDURE — 80053 COMPREHEN METABOLIC PANEL: CPT

## 2024-06-08 PROCEDURE — 6370000000 HC RX 637 (ALT 250 FOR IP): Performed by: SPECIALIST

## 2024-06-08 RX ORDER — GABAPENTIN 100 MG/1
100 CAPSULE ORAL 3 TIMES DAILY
Status: DISCONTINUED | OUTPATIENT
Start: 2024-06-08 | End: 2024-06-11 | Stop reason: HOSPADM

## 2024-06-08 RX ORDER — LIDOCAINE 4 G/G
1 PATCH TOPICAL DAILY
Status: DISCONTINUED | OUTPATIENT
Start: 2024-06-08 | End: 2024-06-11 | Stop reason: HOSPADM

## 2024-06-08 RX ADMIN — DOCUSATE SODIUM 100 MG: 100 CAPSULE, LIQUID FILLED ORAL at 21:20

## 2024-06-08 RX ADMIN — APIXABAN 2.5 MG: 2.5 TABLET, FILM COATED ORAL at 21:22

## 2024-06-08 RX ADMIN — ARFORMOTEROL TARTRATE 15 MCG: 15 SOLUTION RESPIRATORY (INHALATION) at 07:30

## 2024-06-08 RX ADMIN — LINEZOLID 600 MG: 600 TABLET, FILM COATED ORAL at 21:21

## 2024-06-08 RX ADMIN — ATORVASTATIN CALCIUM 10 MG: 10 TABLET, FILM COATED ORAL at 08:36

## 2024-06-08 RX ADMIN — ARFORMOTEROL TARTRATE 15 MCG: 15 SOLUTION RESPIRATORY (INHALATION) at 16:12

## 2024-06-08 RX ADMIN — METOPROLOL TARTRATE 50 MG: 50 TABLET, FILM COATED ORAL at 08:36

## 2024-06-08 RX ADMIN — HYDROCODONE BITARTRATE AND ACETAMINOPHEN 1 TABLET: 5; 325 TABLET ORAL at 21:19

## 2024-06-08 RX ADMIN — HYDROCODONE BITARTRATE AND ACETAMINOPHEN 1 TABLET: 5; 325 TABLET ORAL at 08:36

## 2024-06-08 RX ADMIN — APIXABAN 2.5 MG: 2.5 TABLET, FILM COATED ORAL at 08:36

## 2024-06-08 RX ADMIN — GABAPENTIN 100 MG: 100 CAPSULE ORAL at 11:32

## 2024-06-08 RX ADMIN — LINEZOLID 600 MG: 600 TABLET, FILM COATED ORAL at 08:36

## 2024-06-08 RX ADMIN — Medication 10 ML: at 21:23

## 2024-06-08 RX ADMIN — HYDROCODONE BITARTRATE AND ACETAMINOPHEN 1 TABLET: 5; 325 TABLET ORAL at 00:55

## 2024-06-08 RX ADMIN — BUDESONIDE 250 MCG: 0.25 SUSPENSION RESPIRATORY (INHALATION) at 16:12

## 2024-06-08 RX ADMIN — METOPROLOL TARTRATE 50 MG: 50 TABLET, FILM COATED ORAL at 21:20

## 2024-06-08 RX ADMIN — Medication 10 ML: at 08:36

## 2024-06-08 RX ADMIN — GABAPENTIN 100 MG: 100 CAPSULE ORAL at 14:54

## 2024-06-08 RX ADMIN — BUDESONIDE 250 MCG: 0.25 SUSPENSION RESPIRATORY (INHALATION) at 07:31

## 2024-06-08 RX ADMIN — GABAPENTIN 100 MG: 100 CAPSULE ORAL at 21:22

## 2024-06-08 ASSESSMENT — PAIN DESCRIPTION - ORIENTATION
ORIENTATION: RIGHT
ORIENTATION: RIGHT

## 2024-06-08 ASSESSMENT — PAIN SCALES - GENERAL
PAINLEVEL_OUTOF10: 8
PAINLEVEL_OUTOF10: 7
PAINLEVEL_OUTOF10: 7

## 2024-06-08 ASSESSMENT — PAIN DESCRIPTION - DESCRIPTORS
DESCRIPTORS: STABBING;BURNING;THROBBING
DESCRIPTORS: ACHING;DULL;DISCOMFORT

## 2024-06-08 ASSESSMENT — PAIN DESCRIPTION - LOCATION
LOCATION: LEG
LOCATION: LEG

## 2024-06-08 ASSESSMENT — PAIN - FUNCTIONAL ASSESSMENT
PAIN_FUNCTIONAL_ASSESSMENT: PREVENTS OR INTERFERES SOME ACTIVE ACTIVITIES AND ADLS
PAIN_FUNCTIONAL_ASSESSMENT: PREVENTS OR INTERFERES WITH MANY ACTIVE NOT PASSIVE ACTIVITIES

## 2024-06-09 LAB
ALBUMIN SERPL-MCNC: 2.9 G/DL (ref 3.5–5.2)
ALP SERPL-CCNC: 87 U/L (ref 35–104)
ALT SERPL-CCNC: <5 U/L (ref 0–32)
ANION GAP SERPL CALCULATED.3IONS-SCNC: 6 MMOL/L (ref 7–16)
AST SERPL-CCNC: 17 U/L (ref 0–31)
BASOPHILS # BLD: 0.05 K/UL (ref 0–0.2)
BASOPHILS NFR BLD: 1 % (ref 0–2)
BILIRUB SERPL-MCNC: 0.3 MG/DL (ref 0–1.2)
BUN SERPL-MCNC: 10 MG/DL (ref 6–23)
CALCIUM SERPL-MCNC: 8.9 MG/DL (ref 8.6–10.2)
CHLORIDE SERPL-SCNC: 99 MMOL/L (ref 98–107)
CO2 SERPL-SCNC: 32 MMOL/L (ref 22–29)
CREAT SERPL-MCNC: 0.6 MG/DL (ref 0.5–1)
CRP SERPL HS-MCNC: 11 MG/L (ref 0–5)
EOSINOPHIL # BLD: 0.31 K/UL (ref 0.05–0.5)
EOSINOPHILS RELATIVE PERCENT: 5 % (ref 0–6)
ERYTHROCYTE [DISTWIDTH] IN BLOOD BY AUTOMATED COUNT: 13 % (ref 11.5–15)
ERYTHROCYTE [SEDIMENTATION RATE] IN BLOOD BY WESTERGREN METHOD: 9 MM/HR (ref 0–20)
GFR, ESTIMATED: 85 ML/MIN/1.73M2
GLUCOSE SERPL-MCNC: 82 MG/DL (ref 74–99)
HCT VFR BLD AUTO: 36.8 % (ref 34–48)
HGB BLD-MCNC: 11.3 G/DL (ref 11.5–15.5)
IMM GRANULOCYTES # BLD AUTO: <0.03 K/UL (ref 0–0.58)
IMM GRANULOCYTES NFR BLD: 0 % (ref 0–5)
LYMPHOCYTES NFR BLD: 1.77 K/UL (ref 1.5–4)
LYMPHOCYTES RELATIVE PERCENT: 26 % (ref 20–42)
MAGNESIUM SERPL-MCNC: 1.7 MG/DL (ref 1.6–2.6)
MCH RBC QN AUTO: 29.2 PG (ref 26–35)
MCHC RBC AUTO-ENTMCNC: 30.7 G/DL (ref 32–34.5)
MCV RBC AUTO: 95.1 FL (ref 80–99.9)
MICROORGANISM SPEC CULT: ABNORMAL
MICROORGANISM/AGENT SPEC: ABNORMAL
MONOCYTES NFR BLD: 0.73 K/UL (ref 0.1–0.95)
MONOCYTES NFR BLD: 11 % (ref 2–12)
NEUTROPHILS NFR BLD: 58 % (ref 43–80)
NEUTS SEG NFR BLD: 4.01 K/UL (ref 1.8–7.3)
PHOSPHATE SERPL-MCNC: 3.1 MG/DL (ref 2.5–4.5)
PLATELET # BLD AUTO: 165 K/UL (ref 130–450)
PMV BLD AUTO: 11.3 FL (ref 7–12)
POTASSIUM SERPL-SCNC: 4 MMOL/L (ref 3.5–5)
PROT SERPL-MCNC: 5.3 G/DL (ref 6.4–8.3)
RBC # BLD AUTO: 3.87 M/UL (ref 3.5–5.5)
SERVICE CMNT-IMP: ABNORMAL
SODIUM SERPL-SCNC: 137 MMOL/L (ref 132–146)
SPECIMEN DESCRIPTION: ABNORMAL
WBC OTHER # BLD: 6.9 K/UL (ref 4.5–11.5)

## 2024-06-09 PROCEDURE — 6370000000 HC RX 637 (ALT 250 FOR IP)

## 2024-06-09 PROCEDURE — 94640 AIRWAY INHALATION TREATMENT: CPT

## 2024-06-09 PROCEDURE — 90714 TD VACC NO PRESV 7 YRS+ IM: CPT

## 2024-06-09 PROCEDURE — 6360000002 HC RX W HCPCS

## 2024-06-09 PROCEDURE — 83735 ASSAY OF MAGNESIUM: CPT

## 2024-06-09 PROCEDURE — 80053 COMPREHEN METABOLIC PANEL: CPT

## 2024-06-09 PROCEDURE — 1200000000 HC SEMI PRIVATE

## 2024-06-09 PROCEDURE — 97530 THERAPEUTIC ACTIVITIES: CPT

## 2024-06-09 PROCEDURE — 97535 SELF CARE MNGMENT TRAINING: CPT

## 2024-06-09 PROCEDURE — 6370000000 HC RX 637 (ALT 250 FOR IP): Performed by: SPECIALIST

## 2024-06-09 PROCEDURE — 2580000003 HC RX 258

## 2024-06-09 PROCEDURE — 36415 COLL VENOUS BLD VENIPUNCTURE: CPT

## 2024-06-09 PROCEDURE — 6370000000 HC RX 637 (ALT 250 FOR IP): Performed by: NURSE PRACTITIONER

## 2024-06-09 PROCEDURE — 85652 RBC SED RATE AUTOMATED: CPT

## 2024-06-09 PROCEDURE — 86140 C-REACTIVE PROTEIN: CPT

## 2024-06-09 PROCEDURE — 90471 IMMUNIZATION ADMIN: CPT

## 2024-06-09 PROCEDURE — 6370000000 HC RX 637 (ALT 250 FOR IP): Performed by: INTERNAL MEDICINE

## 2024-06-09 PROCEDURE — 85025 COMPLETE CBC W/AUTO DIFF WBC: CPT

## 2024-06-09 PROCEDURE — 2700000000 HC OXYGEN THERAPY PER DAY

## 2024-06-09 PROCEDURE — 97168 OT RE-EVAL EST PLAN CARE: CPT

## 2024-06-09 PROCEDURE — 84100 ASSAY OF PHOSPHORUS: CPT

## 2024-06-09 RX ADMIN — GABAPENTIN 100 MG: 100 CAPSULE ORAL at 21:50

## 2024-06-09 RX ADMIN — APIXABAN 2.5 MG: 2.5 TABLET, FILM COATED ORAL at 08:04

## 2024-06-09 RX ADMIN — APIXABAN 2.5 MG: 2.5 TABLET, FILM COATED ORAL at 21:50

## 2024-06-09 RX ADMIN — CLOSTRIDIUM TETANI TOXOID ANTIGEN (FORMALDEHYDE INACTIVATED) AND CORYNEBACTERIUM DIPHTHERIAE TOXOID ANTIGEN (FORMALDEHYDE INACTIVATED) 0.5 ML: 5; 2 INJECTION, SUSPENSION INTRAMUSCULAR at 15:06

## 2024-06-09 RX ADMIN — METOPROLOL TARTRATE 50 MG: 50 TABLET, FILM COATED ORAL at 21:49

## 2024-06-09 RX ADMIN — HYDROCODONE BITARTRATE AND ACETAMINOPHEN 1 TABLET: 5; 325 TABLET ORAL at 12:50

## 2024-06-09 RX ADMIN — ATORVASTATIN CALCIUM 10 MG: 10 TABLET, FILM COATED ORAL at 08:04

## 2024-06-09 RX ADMIN — DOCUSATE SODIUM 100 MG: 100 CAPSULE, LIQUID FILLED ORAL at 21:50

## 2024-06-09 RX ADMIN — GABAPENTIN 100 MG: 100 CAPSULE ORAL at 08:04

## 2024-06-09 RX ADMIN — ARFORMOTEROL TARTRATE 15 MCG: 15 SOLUTION RESPIRATORY (INHALATION) at 06:36

## 2024-06-09 RX ADMIN — GABAPENTIN 100 MG: 100 CAPSULE ORAL at 15:05

## 2024-06-09 RX ADMIN — Medication 10 ML: at 08:05

## 2024-06-09 RX ADMIN — BUDESONIDE 250 MCG: 0.25 SUSPENSION RESPIRATORY (INHALATION) at 06:36

## 2024-06-09 RX ADMIN — METOPROLOL TARTRATE 50 MG: 50 TABLET, FILM COATED ORAL at 08:04

## 2024-06-09 RX ADMIN — BUDESONIDE 250 MCG: 0.25 SUSPENSION RESPIRATORY (INHALATION) at 19:03

## 2024-06-09 RX ADMIN — LINEZOLID 600 MG: 600 TABLET, FILM COATED ORAL at 08:04

## 2024-06-09 RX ADMIN — ARFORMOTEROL TARTRATE 15 MCG: 15 SOLUTION RESPIRATORY (INHALATION) at 19:03

## 2024-06-09 RX ADMIN — LINEZOLID 600 MG: 600 TABLET, FILM COATED ORAL at 21:49

## 2024-06-09 RX ADMIN — HYDROCODONE BITARTRATE AND ACETAMINOPHEN 1 TABLET: 5; 325 TABLET ORAL at 21:49

## 2024-06-09 ASSESSMENT — PAIN SCALES - GENERAL
PAINLEVEL_OUTOF10: 7
PAINLEVEL_OUTOF10: 7

## 2024-06-09 ASSESSMENT — PAIN DESCRIPTION - DESCRIPTORS
DESCRIPTORS: ACHING;STABBING;THROBBING
DESCRIPTORS: ACHING

## 2024-06-09 ASSESSMENT — PAIN DESCRIPTION - ORIENTATION
ORIENTATION: RIGHT
ORIENTATION: RIGHT

## 2024-06-09 ASSESSMENT — PAIN - FUNCTIONAL ASSESSMENT: PAIN_FUNCTIONAL_ASSESSMENT: PREVENTS OR INTERFERES SOME ACTIVE ACTIVITIES AND ADLS

## 2024-06-09 ASSESSMENT — PAIN DESCRIPTION - LOCATION
LOCATION: LEG
LOCATION: LEG

## 2024-06-09 NOTE — PLAN OF CARE
Problem: Safety - Adult  Goal: Free from fall injury  6/8/2024 2251 by Sujey Kaur, RN  Outcome: Progressing     Problem: Pain  Goal: Verbalizes/displays adequate comfort level or baseline comfort level  6/8/2024 2251 by Sujey Kaur, RN  Outcome: Progressing     Problem: ABCDS Injury Assessment  Goal: Absence of physical injury  6/8/2024 2251 by Sujey Kaur, RN  Outcome: Progressing     Problem: Discharge Planning  Goal: Discharge to home or other facility with appropriate resources  6/8/2024 2251 by Sujey Kaur, RN  Outcome: Progressing     Problem: Skin/Tissue Integrity  Goal: Absence of new skin breakdown  Description: 1.  Monitor for areas of redness and/or skin breakdown  2.  Assess vascular access sites hourly  3.  Every 4-6 hours minimum:  Change oxygen saturation probe site  4.  Every 4-6 hours:  If on nasal continuous positive airway pressure, respiratory therapy assess nares and determine need for appliance change or resting period.  6/8/2024 2251 by Sujey Kaur, RN  Outcome: Progressing

## 2024-06-10 PROBLEM — E43 SEVERE PROTEIN-CALORIE MALNUTRITION (HCC): Chronic | Status: ACTIVE | Noted: 2024-06-10

## 2024-06-10 LAB
ALBUMIN SERPL-MCNC: 2.8 G/DL (ref 3.5–5.2)
ALP SERPL-CCNC: 83 U/L (ref 35–104)
ALT SERPL-CCNC: <5 U/L (ref 0–32)
ANION GAP SERPL CALCULATED.3IONS-SCNC: 5 MMOL/L (ref 7–16)
AST SERPL-CCNC: 16 U/L (ref 0–31)
BASOPHILS # BLD: 0.04 K/UL (ref 0–0.2)
BASOPHILS NFR BLD: 1 % (ref 0–2)
BILIRUB SERPL-MCNC: 0.3 MG/DL (ref 0–1.2)
BUN SERPL-MCNC: 13 MG/DL (ref 6–23)
CALCIUM SERPL-MCNC: 8.7 MG/DL (ref 8.6–10.2)
CHLORIDE SERPL-SCNC: 98 MMOL/L (ref 98–107)
CO2 SERPL-SCNC: 33 MMOL/L (ref 22–29)
CREAT SERPL-MCNC: 0.7 MG/DL (ref 0.5–1)
EOSINOPHIL # BLD: 0.19 K/UL (ref 0.05–0.5)
EOSINOPHILS RELATIVE PERCENT: 3 % (ref 0–6)
ERYTHROCYTE [DISTWIDTH] IN BLOOD BY AUTOMATED COUNT: 13 % (ref 11.5–15)
GFR, ESTIMATED: 83 ML/MIN/1.73M2
GLUCOSE SERPL-MCNC: 84 MG/DL (ref 74–99)
HCT VFR BLD AUTO: 34.4 % (ref 34–48)
HGB BLD-MCNC: 10.5 G/DL (ref 11.5–15.5)
IMM GRANULOCYTES # BLD AUTO: <0.03 K/UL (ref 0–0.58)
IMM GRANULOCYTES NFR BLD: 0 % (ref 0–5)
LYMPHOCYTES NFR BLD: 1.4 K/UL (ref 1.5–4)
LYMPHOCYTES RELATIVE PERCENT: 23 % (ref 20–42)
MAGNESIUM SERPL-MCNC: 1.7 MG/DL (ref 1.6–2.6)
MCH RBC QN AUTO: 29 PG (ref 26–35)
MCHC RBC AUTO-ENTMCNC: 30.5 G/DL (ref 32–34.5)
MCV RBC AUTO: 95 FL (ref 80–99.9)
MONOCYTES NFR BLD: 0.62 K/UL (ref 0.1–0.95)
MONOCYTES NFR BLD: 10 % (ref 2–12)
NEUTROPHILS NFR BLD: 63 % (ref 43–80)
NEUTS SEG NFR BLD: 3.79 K/UL (ref 1.8–7.3)
PHOSPHATE SERPL-MCNC: 3 MG/DL (ref 2.5–4.5)
PLATELET # BLD AUTO: 181 K/UL (ref 130–450)
PMV BLD AUTO: 11 FL (ref 7–12)
POTASSIUM SERPL-SCNC: 4.1 MMOL/L (ref 3.5–5)
PROT SERPL-MCNC: 5.1 G/DL (ref 6.4–8.3)
RBC # BLD AUTO: 3.62 M/UL (ref 3.5–5.5)
SODIUM SERPL-SCNC: 136 MMOL/L (ref 132–146)
WBC OTHER # BLD: 6.1 K/UL (ref 4.5–11.5)

## 2024-06-10 PROCEDURE — 6370000000 HC RX 637 (ALT 250 FOR IP)

## 2024-06-10 PROCEDURE — 6370000000 HC RX 637 (ALT 250 FOR IP): Performed by: SPECIALIST

## 2024-06-10 PROCEDURE — 2580000003 HC RX 258

## 2024-06-10 PROCEDURE — 83735 ASSAY OF MAGNESIUM: CPT

## 2024-06-10 PROCEDURE — 6360000002 HC RX W HCPCS

## 2024-06-10 PROCEDURE — 6370000000 HC RX 637 (ALT 250 FOR IP): Performed by: NURSE PRACTITIONER

## 2024-06-10 PROCEDURE — 6370000000 HC RX 637 (ALT 250 FOR IP): Performed by: INTERNAL MEDICINE

## 2024-06-10 PROCEDURE — 85025 COMPLETE CBC W/AUTO DIFF WBC: CPT

## 2024-06-10 PROCEDURE — 94640 AIRWAY INHALATION TREATMENT: CPT

## 2024-06-10 PROCEDURE — 1200000000 HC SEMI PRIVATE

## 2024-06-10 PROCEDURE — 2700000000 HC OXYGEN THERAPY PER DAY

## 2024-06-10 PROCEDURE — 80053 COMPREHEN METABOLIC PANEL: CPT

## 2024-06-10 PROCEDURE — 84100 ASSAY OF PHOSPHORUS: CPT

## 2024-06-10 PROCEDURE — 36415 COLL VENOUS BLD VENIPUNCTURE: CPT

## 2024-06-10 RX ORDER — FENTANYL CITRATE 0.05 MG/ML
12.5 INJECTION, SOLUTION INTRAMUSCULAR; INTRAVENOUS ONCE
Status: COMPLETED | OUTPATIENT
Start: 2024-06-10 | End: 2024-06-10

## 2024-06-10 RX ADMIN — BUDESONIDE 250 MCG: 0.25 SUSPENSION RESPIRATORY (INHALATION) at 06:35

## 2024-06-10 RX ADMIN — GABAPENTIN 100 MG: 100 CAPSULE ORAL at 13:26

## 2024-06-10 RX ADMIN — BUDESONIDE 250 MCG: 0.25 SUSPENSION RESPIRATORY (INHALATION) at 19:59

## 2024-06-10 RX ADMIN — APIXABAN 2.5 MG: 2.5 TABLET, FILM COATED ORAL at 08:07

## 2024-06-10 RX ADMIN — LINEZOLID 600 MG: 600 TABLET, FILM COATED ORAL at 08:06

## 2024-06-10 RX ADMIN — GABAPENTIN 100 MG: 100 CAPSULE ORAL at 08:06

## 2024-06-10 RX ADMIN — METOPROLOL TARTRATE 50 MG: 50 TABLET, FILM COATED ORAL at 08:06

## 2024-06-10 RX ADMIN — FENTANYL CITRATE 12.5 MCG: 0.05 INJECTION, SOLUTION INTRAMUSCULAR; INTRAVENOUS at 10:00

## 2024-06-10 RX ADMIN — ARFORMOTEROL TARTRATE 15 MCG: 15 SOLUTION RESPIRATORY (INHALATION) at 06:35

## 2024-06-10 RX ADMIN — GABAPENTIN 100 MG: 100 CAPSULE ORAL at 20:48

## 2024-06-10 RX ADMIN — ATORVASTATIN CALCIUM 10 MG: 10 TABLET, FILM COATED ORAL at 08:07

## 2024-06-10 RX ADMIN — LINEZOLID 600 MG: 600 TABLET, FILM COATED ORAL at 20:47

## 2024-06-10 RX ADMIN — DOCUSATE SODIUM 100 MG: 100 CAPSULE, LIQUID FILLED ORAL at 20:47

## 2024-06-10 RX ADMIN — Medication 10 ML: at 20:48

## 2024-06-10 RX ADMIN — HYDROCODONE BITARTRATE AND ACETAMINOPHEN 1 TABLET: 5; 325 TABLET ORAL at 08:54

## 2024-06-10 RX ADMIN — Medication 10 ML: at 08:08

## 2024-06-10 RX ADMIN — APIXABAN 2.5 MG: 2.5 TABLET, FILM COATED ORAL at 20:47

## 2024-06-10 RX ADMIN — METOPROLOL TARTRATE 50 MG: 50 TABLET, FILM COATED ORAL at 20:47

## 2024-06-10 RX ADMIN — HYDROCODONE BITARTRATE AND ACETAMINOPHEN 1 TABLET: 5; 325 TABLET ORAL at 20:48

## 2024-06-10 RX ADMIN — ARFORMOTEROL TARTRATE 15 MCG: 15 SOLUTION RESPIRATORY (INHALATION) at 19:59

## 2024-06-10 ASSESSMENT — PAIN DESCRIPTION - ORIENTATION
ORIENTATION: RIGHT

## 2024-06-10 ASSESSMENT — PAIN DESCRIPTION - DESCRIPTORS
DESCRIPTORS: ACHING;SHARP
DESCRIPTORS: ACHING;SHARP
DESCRIPTORS: THROBBING;DISCOMFORT

## 2024-06-10 ASSESSMENT — PAIN SCALES - GENERAL
PAINLEVEL_OUTOF10: 7
PAINLEVEL_OUTOF10: 7
PAINLEVEL_OUTOF10: 8

## 2024-06-10 ASSESSMENT — PAIN DESCRIPTION - LOCATION
LOCATION: LEG

## 2024-06-10 ASSESSMENT — PAIN - FUNCTIONAL ASSESSMENT: PAIN_FUNCTIONAL_ASSESSMENT: PREVENTS OR INTERFERES SOME ACTIVE ACTIVITIES AND ADLS

## 2024-06-10 NOTE — CARE COORDINATION
Nurse in room talking with patient when I attempted to deliver IMM and review it with patient. Will try again. .kp

## 2024-06-10 NOTE — PLAN OF CARE
Problem: Safety - Adult  Goal: Free from fall injury  6/10/2024 0757 by Latonia Cordon, RN  Outcome: Progressing  6/9/2024 2145 by Shaan Arndt, RN  Outcome: Progressing

## 2024-06-10 NOTE — CARE COORDINATION
6-10-Cm note: PRECERT pending for [facility], wound vac will be delivered there today, HENS and Ambulette form completed and in soft chart. Pt aware of pending status, dtr at the bedside and aware as well.  PO Zyvox med rec'd. Electronically signed by Yarelis Bell RN on 6/10/2024 at 3:57 PM

## 2024-06-10 NOTE — PLAN OF CARE
Problem: Safety - Adult  Goal: Free from fall injury  6/9/2024 2145 by Shaan Arndt, RN  Outcome: Progressing     Problem: ABCDS Injury Assessment  Goal: Absence of physical injury  6/9/2024 2145 by Shaan Arndt, RN  Outcome: Progressing     Problem: Skin/Tissue Integrity  Goal: Absence of new skin breakdown  Description: 1.  Monitor for areas of redness and/or skin breakdown  2.  Assess vascular access sites hourly  3.  Every 4-6 hours minimum:  Change oxygen saturation probe site  4.  Every 4-6 hours:  If on nasal continuous positive airway pressure, respiratory therapy assess nares and determine need for appliance change or resting period.  6/9/2024 2145 by Shaan Arndt, RN  Outcome: Progressing

## 2024-06-11 VITALS
WEIGHT: 92.25 LBS | SYSTOLIC BLOOD PRESSURE: 113 MMHG | HEART RATE: 89 BPM | OXYGEN SATURATION: 96 % | HEIGHT: 60 IN | BODY MASS INDEX: 18.11 KG/M2 | DIASTOLIC BLOOD PRESSURE: 48 MMHG | TEMPERATURE: 97.9 F | RESPIRATION RATE: 18 BRPM

## 2024-06-11 LAB
ALBUMIN SERPL-MCNC: 3 G/DL (ref 3.5–5.2)
ALP SERPL-CCNC: 93 U/L (ref 35–104)
ALT SERPL-CCNC: <5 U/L (ref 0–32)
ANION GAP SERPL CALCULATED.3IONS-SCNC: 9 MMOL/L (ref 7–16)
AST SERPL-CCNC: 17 U/L (ref 0–31)
BASOPHILS # BLD: 0.04 K/UL (ref 0–0.2)
BASOPHILS NFR BLD: 1 % (ref 0–2)
BILIRUB SERPL-MCNC: 0.3 MG/DL (ref 0–1.2)
BUN SERPL-MCNC: 11 MG/DL (ref 6–23)
CALCIUM SERPL-MCNC: 9.2 MG/DL (ref 8.6–10.2)
CHLORIDE SERPL-SCNC: 102 MMOL/L (ref 98–107)
CO2 SERPL-SCNC: 30 MMOL/L (ref 22–29)
CREAT SERPL-MCNC: 0.8 MG/DL (ref 0.5–1)
EOSINOPHIL # BLD: 0.2 K/UL (ref 0.05–0.5)
EOSINOPHILS RELATIVE PERCENT: 3 % (ref 0–6)
ERYTHROCYTE [DISTWIDTH] IN BLOOD BY AUTOMATED COUNT: 13.3 % (ref 11.5–15)
GFR, ESTIMATED: 74 ML/MIN/1.73M2
GLUCOSE SERPL-MCNC: 91 MG/DL (ref 74–99)
HCT VFR BLD AUTO: 37.1 % (ref 34–48)
HGB BLD-MCNC: 11.2 G/DL (ref 11.5–15.5)
IMM GRANULOCYTES # BLD AUTO: <0.03 K/UL (ref 0–0.58)
IMM GRANULOCYTES NFR BLD: 0 % (ref 0–5)
LYMPHOCYTES NFR BLD: 1.72 K/UL (ref 1.5–4)
LYMPHOCYTES RELATIVE PERCENT: 27 % (ref 20–42)
MAGNESIUM SERPL-MCNC: 1.8 MG/DL (ref 1.6–2.6)
MCH RBC QN AUTO: 28.9 PG (ref 26–35)
MCHC RBC AUTO-ENTMCNC: 30.2 G/DL (ref 32–34.5)
MCV RBC AUTO: 95.6 FL (ref 80–99.9)
MONOCYTES NFR BLD: 0.63 K/UL (ref 0.1–0.95)
MONOCYTES NFR BLD: 10 % (ref 2–12)
NEUTROPHILS NFR BLD: 59 % (ref 43–80)
NEUTS SEG NFR BLD: 3.7 K/UL (ref 1.8–7.3)
PHOSPHATE SERPL-MCNC: 3.4 MG/DL (ref 2.5–4.5)
PLATELET # BLD AUTO: 193 K/UL (ref 130–450)
PMV BLD AUTO: 11.1 FL (ref 7–12)
POTASSIUM SERPL-SCNC: 4.3 MMOL/L (ref 3.5–5)
PROT SERPL-MCNC: 5.8 G/DL (ref 6.4–8.3)
RBC # BLD AUTO: 3.88 M/UL (ref 3.5–5.5)
SODIUM SERPL-SCNC: 141 MMOL/L (ref 132–146)
WBC OTHER # BLD: 6.3 K/UL (ref 4.5–11.5)

## 2024-06-11 PROCEDURE — 6370000000 HC RX 637 (ALT 250 FOR IP)

## 2024-06-11 PROCEDURE — 85025 COMPLETE CBC W/AUTO DIFF WBC: CPT

## 2024-06-11 PROCEDURE — 83735 ASSAY OF MAGNESIUM: CPT

## 2024-06-11 PROCEDURE — 97530 THERAPEUTIC ACTIVITIES: CPT

## 2024-06-11 PROCEDURE — 36415 COLL VENOUS BLD VENIPUNCTURE: CPT

## 2024-06-11 PROCEDURE — 6360000002 HC RX W HCPCS

## 2024-06-11 PROCEDURE — 6370000000 HC RX 637 (ALT 250 FOR IP): Performed by: INTERNAL MEDICINE

## 2024-06-11 PROCEDURE — 84100 ASSAY OF PHOSPHORUS: CPT

## 2024-06-11 PROCEDURE — 2700000000 HC OXYGEN THERAPY PER DAY

## 2024-06-11 PROCEDURE — 6370000000 HC RX 637 (ALT 250 FOR IP): Performed by: SPECIALIST

## 2024-06-11 PROCEDURE — 94640 AIRWAY INHALATION TREATMENT: CPT

## 2024-06-11 PROCEDURE — 2580000003 HC RX 258

## 2024-06-11 PROCEDURE — 6370000000 HC RX 637 (ALT 250 FOR IP): Performed by: NURSE PRACTITIONER

## 2024-06-11 PROCEDURE — 80053 COMPREHEN METABOLIC PANEL: CPT

## 2024-06-11 RX ORDER — DOXYCYCLINE HYCLATE 100 MG
100 TABLET ORAL 2 TIMES DAILY
Qty: 20 TABLET | Refills: 0 | Status: ON HOLD | DISCHARGE
Start: 2024-06-11 | End: 2024-06-21

## 2024-06-11 RX ORDER — FUROSEMIDE 20 MG/1
20 TABLET ORAL DAILY
Qty: 60 TABLET | Refills: 3 | Status: ON HOLD | DISCHARGE
Start: 2024-06-11

## 2024-06-11 RX ORDER — GABAPENTIN 100 MG/1
100 CAPSULE ORAL 3 TIMES DAILY
Qty: 21 CAPSULE | Refills: 0 | Status: ON HOLD | DISCHARGE
Start: 2024-06-11 | End: 2024-06-18

## 2024-06-11 RX ORDER — LIDOCAINE 4 G/G
1 PATCH TOPICAL DAILY
Status: ON HOLD | DISCHARGE
Start: 2024-06-12

## 2024-06-11 RX ORDER — HYDROCODONE BITARTRATE AND ACETAMINOPHEN 5; 325 MG/1; MG/1
1 TABLET ORAL EVERY 4 HOURS PRN
Qty: 18 TABLET | Refills: 0 | Status: ON HOLD | OUTPATIENT
Start: 2024-06-11 | End: 2024-06-14

## 2024-06-11 RX ADMIN — Medication 10 ML: at 08:05

## 2024-06-11 RX ADMIN — METOPROLOL TARTRATE 50 MG: 50 TABLET, FILM COATED ORAL at 08:03

## 2024-06-11 RX ADMIN — LINEZOLID 600 MG: 600 TABLET, FILM COATED ORAL at 08:03

## 2024-06-11 RX ADMIN — GABAPENTIN 100 MG: 100 CAPSULE ORAL at 13:44

## 2024-06-11 RX ADMIN — HYDROCODONE BITARTRATE AND ACETAMINOPHEN 1 TABLET: 5; 325 TABLET ORAL at 09:57

## 2024-06-11 RX ADMIN — GABAPENTIN 100 MG: 100 CAPSULE ORAL at 08:03

## 2024-06-11 RX ADMIN — BUDESONIDE 250 MCG: 0.25 SUSPENSION RESPIRATORY (INHALATION) at 06:47

## 2024-06-11 RX ADMIN — ATORVASTATIN CALCIUM 10 MG: 10 TABLET, FILM COATED ORAL at 08:02

## 2024-06-11 RX ADMIN — HYDROCODONE BITARTRATE AND ACETAMINOPHEN 1 TABLET: 5; 325 TABLET ORAL at 04:36

## 2024-06-11 RX ADMIN — HYDROCODONE BITARTRATE AND ACETAMINOPHEN 1 TABLET: 5; 325 TABLET ORAL at 16:22

## 2024-06-11 RX ADMIN — ARFORMOTEROL TARTRATE 15 MCG: 15 SOLUTION RESPIRATORY (INHALATION) at 06:47

## 2024-06-11 RX ADMIN — APIXABAN 2.5 MG: 2.5 TABLET, FILM COATED ORAL at 08:03

## 2024-06-11 ASSESSMENT — PAIN SCALES - GENERAL
PAINLEVEL_OUTOF10: 3
PAINLEVEL_OUTOF10: 7
PAINLEVEL_OUTOF10: 5
PAINLEVEL_OUTOF10: 6

## 2024-06-11 ASSESSMENT — PAIN DESCRIPTION - LOCATION
LOCATION: LEG

## 2024-06-11 ASSESSMENT — PAIN DESCRIPTION - DESCRIPTORS
DESCRIPTORS: THROBBING;ACHING
DESCRIPTORS: ACHING;DISCOMFORT;SORE

## 2024-06-11 ASSESSMENT — PAIN - FUNCTIONAL ASSESSMENT: PAIN_FUNCTIONAL_ASSESSMENT: PREVENTS OR INTERFERES SOME ACTIVE ACTIVITIES AND ADLS

## 2024-06-11 ASSESSMENT — PAIN DESCRIPTION - ORIENTATION
ORIENTATION: RIGHT
ORIENTATION: RIGHT;LOWER

## 2024-06-11 NOTE — CARE COORDINATION
6/11/2024 1359 CM note: [facility] accepted pt for skilled care and obtained PRECERT. Per SNF liaison, Rain, blanka vac is at facility.  Plan is for w/c transport to SNF. Pt's dtr informed that insurance will not cover w/c transport and she voiced understanding. Arrangements made for pt to transport to SNF at 7:30pm via [facility] ambulette.(No w/c transport available per EMT, Erik and Harman Garcia)  Pt, pt's dtr Mariel, tulio and SNF liaison informed. HENS done, need signed STACEY. N-N [phone]. Elio OSCAR

## 2024-06-11 NOTE — CARE COORDINATION
6/11/2024 0943  note: [facility] SNF accepted pt for skilled care and submitted for PRECERT-await determination. Per SNF liaison, Rain, wound vac is at facility. Pt and dtr at bedside and updated. Plan is for w/c transport to SNF. Pt's dtr informed that insurance will not cover w/c transport and she voiced understanding. HENS done, need signed STACEY. Elio OSCAR

## 2024-06-11 NOTE — PROGRESS NOTES
Inpatient Cardiology Consultation      Reason for Consult:  Pre OP    Consulting Physician: Dr. Perez     Requesting Physician:  Dr. Jones     Date of Consultation: 6/5/2024    History of Present Illness:   Shauna Gaines is an 89-year-old female known to [facility] cardiology through Dr. Tripathi.  Patient was recently seen in his office on 4/18/2024 for hospital follow-up.  Patient was hospitalized for 3 weeks in March 2024 to [facility] due to respiratory infection.  At that time she was diagnosed with sick sinus syndrome including A-fib and pauses requiring permanent pacemaker placement by Dr. Lau.  Dr. Tripathi referred patient to Dr. Chavira due to recent pacemaker placement.    She was recently seen in the emergency department on 5/30/2024 for wound check.  She reportedly was scratched by a dog 1 week prior to coming into the emergency department and presented with a large skin tear with what appeared to be a hematoma.  She was started on doxycycline for infection prophylaxis and referred to the wound care center.  She followed up at the wound care center on 6/3/2024 with Dr. Dexter.  Due to no improvement in the wound he recommended hospital admission for medical and surgical management as she has failed outpatient therapy.  On arrival to the ED blood pressure was 147/65, heart rate 61, 98% on room air and afebrile.  Labs included sodium 140, potassium 3.9, BUN/creatinine 17/0.8, proBNP 482, TSH 1.02, WBC 8.2.  Patient was evaluated by podiatry while in the emergency department with plans for surgery with right lower extremity incision and drainage with possible wound VAC application with Dr. Dexter at 12 PM on 6/4/2024.    Interim  Underwent podiatry surgery without reported postoperative adverse cardiac event.    Past Medical History:   Paroxysmal atrial fibrillation  Onset March 15 and converted to sinus rhythm after dose of Cardizem IV  [id number] of 4 OAC with Eliquis (dose adjusted for age and 
      Inpatient Cardiology Consultation      Reason for Consult:  Pre OP    Consulting Physician: Dr. Perez     Requesting Physician:  Dr. Jones     Date of Consultation: 6/6/2024    History of Present Illness:   Shauna Gaines is an 89-year-old female known to [facility] cardiology through Dr. Tripathi.  Patient was recently seen in his office on 4/18/2024 for hospital follow-up.  Patient was hospitalized for 3 weeks in March 2024 to [facility] due to respiratory infection.  At that time she was diagnosed with sick sinus syndrome including A-fib and pauses requiring permanent pacemaker placement by Dr. Lau.  Dr. Tripathi referred patient to Dr. Chavira due to recent pacemaker placement.    She was recently seen in the emergency department on 5/30/2024 for wound check.  She reportedly was scratched by a dog 1 week prior to coming into the emergency department and presented with a large skin tear with what appeared to be a hematoma.  She was started on doxycycline for infection prophylaxis and referred to the wound care center.  She followed up at the wound care center on 6/3/2024 with Dr. Dexter.  Due to no improvement in the wound he recommended hospital admission for medical and surgical management as she has failed outpatient therapy.  On arrival to the ED blood pressure was 147/65, heart rate 61, 98% on room air and afebrile.  Labs included sodium 140, potassium 3.9, BUN/creatinine 17/0.8, proBNP 482, TSH 1.02, WBC 8.2.  Patient was evaluated by podiatry while in the emergency department with plans for surgery with right lower extremity incision and drainage with possible wound VAC application with Dr. Dexter at 12 PM on 6/4/2024.    Interim  Underwent podiatry surgery without reported postoperative adverse cardiac event.  Spoke with patient and daughter at length.  Daughter report patient is receiving PT OT  Patient report doing well and denies chest pain or shortness of breath      Past Medical History:   Paroxysmal 
    NAME: Shauna Gaines  MR:  [id number]  :   1935  Admit Date:  6/3/2024    Elements of this note, were copied and pasted from Previous. Updates have been made where noted and reflect current exam and medical decision making from the DOS of this encounter.  CHIEF COMPLAINT       Chief Complaint   Patient presents with    Wound Check     Sent from admission by wound care clinic     HISTORY OF PRESENT ILLNESS     Shauna Gaines is a 89 y.o. female admitted on 6/3/2024 and has  has a past medical history of Aortic valve stenosis, Asthma, COPD (chronic obstructive pulmonary disease) ([facility]), COVID, Hearing aid worn, Heart murmur, Hyperlipidemia, and Hypertension.     This is a face to face encounter   06/10/24 family pesent pt in bed vacc was changed no new c/o    in chair family present no concerns    pain with vacc has no c/o on o2    vacc was changed haspain daughter present reviewed labs with daljit fiore   has pain rle vacc no issues with meds     Patient is tolerating medications. No reported adverse drug reactions.  Available labs, imaging studies, microbiologic studies have been reviewed with pt and family if present.  Assessment & Plan     Admitted for   Cellulitis [L03.90]  Cellulitis of right lower extremity [L03.115]  Wound of right lower extremity, initial encounter [S81.801A]  ID following for   1. Right leg cellulitis with abscess.  2. Ulceration of right leg with necrosis of muscle.  3. Peripheral arterial disease.     PROCEDURES:    1. Incision and drainage of right leg abscess.  2. Excisional wound debridement, right leg, to and through level of the deep fascia and muscle, total measurement was 7 cm x 4 cm x 0.5 cm in dimension.  3. Application of wound VAC negative pressure therapy.    Prelim wound/or  cx  S aureus/E faecalis  adjust atbx to linezolid        tetanus & diphtheria toxoids (adult) 5-2 LFU injection 0.5 mL, Once  linezolid (ZYVOX) tablet 600 mg, 2 times per day  Med rec  Cont 
    NAME: Shauna Gaines  MR:  [id number]  :   1935  Admit Date:  6/3/2024    Elements of this note, were copied and pasted from Previous. Updates have been made where noted and reflect current exam and medical decision making from the DOS of this encounter.  CHIEF COMPLAINT       Chief Complaint   Patient presents with    Wound Check     Sent from admission by wound care clinic     HISTORY OF PRESENT ILLNESS     Shauna Gaines is a 89 y.o. female admitted on 6/3/2024 and has  has a past medical history of Aortic valve stenosis, Asthma, COPD (chronic obstructive pulmonary disease) ([facility]), COVID, Hearing aid worn, Heart murmur, Hyperlipidemia, and Hypertension.     This is a face to face encounter   24 [ain with vacc has no c/o on o2    vacc was changed haspain daughter present reviewed labs with daljit fiore   has pain rle vacc no issues with meds     Patient is tolerating medications. No reported adverse drug reactions.  Available labs, imaging studies, microbiologic studies have been reviewed with pt and family if present.  Assessment & Plan     Admitted for   Cellulitis [L03.90]  Cellulitis of right lower extremity [L03.115]  Wound of right lower extremity, initial encounter [S82.801A]  ID following for   1. Right leg cellulitis with abscess.  2. Ulceration of right leg with necrosis of muscle.  3. Peripheral arterial disease.     PROCEDURES:    1. Incision and drainage of right leg abscess.  2. Excisional wound debridement, right leg, to and through level of the deep fascia and muscle, total measurement was 7 cm x 4 cm x 0.5 cm in dimension.  3. Application of wound VAC negative pressure therapy.    Prelim wound/or  cx  S aureus/E faecalis  adjust atbx to linezolid           ampicillin-sulbactam (UNASYN) 3,000 mg in sodium chloride 0.9 % 100 mL IVPB ([id number]), Q6H  doxycycline hyclate (VIBRAMYCIN) capsule 100 mg, 2 times per day             DISPOSITION:  REVIEW OF SYSTEMS     As stated above    
    NAME: Shauna Gaines  MR:  [id number]  :   1935  Admit Date:  6/3/2024    Elements of this note, were copied and pasted from Previous. Updates have been made where noted and reflect current exam and medical decision making from the DOS of this encounter.  CHIEF COMPLAINT       Chief Complaint   Patient presents with    Wound Check     Sent from admission by wound care clinic     HISTORY OF PRESENT ILLNESS     Shauna Gaines is a 89 y.o. female admitted on 6/3/2024 and has  has a past medical history of Aortic valve stenosis, Asthma, COPD (chronic obstructive pulmonary disease) ([facility]), COVID, Hearing aid worn, Heart murmur, Hyperlipidemia, and Hypertension.     This is a face to face encounter   24 has pain rle vacc no issues with meds     Patient is tolerating medications. No reported adverse drug reactions.  Available labs, imaging studies, microbiologic studies have been reviewed with pt and family if present.  Assessment & Plan     Admitted for   Cellulitis [L03.90]  Cellulitis of right lower extremity [L03.115]  Wound of right lower extremity, initial encounter [S81.801A]  ID following for   1. Right leg cellulitis with abscess.  2. Ulceration of right leg with necrosis of muscle.  3. Peripheral arterial disease.     PROCEDURES:    1. Incision and drainage of right leg abscess.  2. Excisional wound debridement, right leg, to and through level of the deep fascia and muscle, total measurement was 7 cm x 4 cm x 0.5 cm in dimension.  3. Application of wound VAC negative pressure therapy.    Prelim wound cx gpo  Or cx pending        ampicillin-sulbactam (UNASYN) 3,000 mg in sodium chloride 0.9 % 100 mL IVPB ([id number]), Q6H  doxycycline hyclate (VIBRAMYCIN) capsule 100 mg, 2 times per day             DISPOSITION:  REVIEW OF SYSTEMS     As stated above      PHYSICAL EXAMINATION    /60   Pulse 76   Temp 98.4 °F (36.9 °C) (Oral)   Resp 17   Ht 1.524 m (5')   Wt 41.8 kg (92 lb 4 oz)   SpO2 97%   BMI 
    NAME: Shauna Gaines  MR:  [id number]  :   1935  Admit Date:  6/3/2024    Elements of this note, were copied and pasted from Previous. Updates have been made where noted and reflect current exam and medical decision making from the DOS of this encounter.  CHIEF COMPLAINT       Chief Complaint   Patient presents with    Wound Check     Sent from admission by wound care clinic     HISTORY OF PRESENT ILLNESS     Shauna Gaines is a 89 y.o. female admitted on 6/3/2024 and has  has a past medical history of Aortic valve stenosis, Asthma, COPD (chronic obstructive pulmonary disease) ([facility]), COVID, Hearing aid worn, Heart murmur, Hyperlipidemia, and Hypertension.     This is a face to face encounter   24 vacc was changed haspain daughter present reviewed labs with daljit fiore   has pain rle vacc no issues with meds     Patient is tolerating medications. No reported adverse drug reactions.  Available labs, imaging studies, microbiologic studies have been reviewed with pt and family if present.  Assessment & Plan     Admitted for   Cellulitis [L03.90]  Cellulitis of right lower extremity [L03.115]  Wound of right lower extremity, initial encounter [S81.801A]  ID following for   1. Right leg cellulitis with abscess.  2. Ulceration of right leg with necrosis of muscle.  3. Peripheral arterial disease.     PROCEDURES:    1. Incision and drainage of right leg abscess.  2. Excisional wound debridement, right leg, to and through level of the deep fascia and muscle, total measurement was 7 cm x 4 cm x 0.5 cm in dimension.  3. Application of wound VAC negative pressure therapy.    Prelim wound/or  cx  S aureus/E faecalis  adjust atbx to linezolid           ampicillin-sulbactam (UNASYN) 3,000 mg in sodium chloride 0.9 % 100 mL IVPB ([id number]), Q6H  doxycycline hyclate (VIBRAMYCIN) capsule 100 mg, 2 times per day             DISPOSITION:  REVIEW OF SYSTEMS     As stated above      PHYSICAL EXAMINATION    BP (!) 126/56   
    Pharmacist Review and Automatic Dose Adjustment of Prophylactic Enoxaparin         The reviewing pharmacist has made an adjustment to the ordered enoxaparin dose or converted to UFH per the approved [facility] protocol and table as identified below.        Shauna Gaines is a 89 y.o. female.     Recent Labs     06/03/24  1751   CREATININE 0.8       Estimated Creatinine Clearance: 31 mL/min (based on SCr of 0.8 mg/dL).    Recent Labs     06/03/24  1751   HGB 12.8   HCT 40.4        Recent Labs     06/04/24  0300   INR 1.5       Height:   Ht Readings from Last 1 Encounters:   04/18/24 1.524 m (5')     Weight:  Wt Readings from Last 1 Encounters:   06/03/24 40.8 kg (90 lb)               Plan: Based upon the patient's weight and renal function    Ordered: Enoxaparin 40mg SUBQ Daily    Changed/converted to    New Order: Enoxaparin 30mg SUBQ Daily      Thank you,  Ross Renee, [facility]  6/4/2024, 7:17 AM    
   06/08/24 0947   Encounter Summary   Encounter Overview/Reason Spiritual/Emotional Needs   Service Provided For Patient   [facility]   Support System Family members   Last Encounter  06/08/24  (L-CW)   Complexity of Encounter Moderate   Spiritual/Emotional needs   Type Spiritual Support   Assessment/Intervention/Outcome   Assessment Coping   Intervention Active listening;Discussed belief system/Anabaptist practices/nitesh;Discussed illness injury and it’s impact;Discussed relationship with God;Explored/Affirmed feelings, thoughts, concerns;Prayer (assurance of)/[place];Read/Provided Scripture   Outcome Comfort;Engaged in conversation;Expressed feelings, needs, and concerns;Peace;Receptive;Expressed Gratitude      visited patient. Patient was laying in bed.  provided a listening presence,  empathy and offered prayer. Spiritual care is ongoing as needed.     Quan Townsend [phone]  
  Physician Progress Note      PATIENT:               MARLA THOMPSON  CSN #:                  [id number]  :                       1935  ADMIT DATE:       6/3/2024 5:21 PM  DISCH DATE:  RESPONDING  PROVIDER #:        Mj Kincaid DO          QUERY TEXT:    Patient admitted with cellulitis. Noted documentation of sepsis in  IM   progress note. In order to support the diagnosis of sepsis, please include   additional clinical indicators in your documentation.  Or please document if   the diagnosis of sepsis has been ruled out after further study    The medical record reflects the following:  Risk Factors: Cellulitis RLE failed OP antibiotics.  Clinical Indicators: VS: /65, HR , RR 16-20, Temp 99.0 x1, Wbc   8.2, Procal 0.03, Crp 15  Treatment:  Ancef, Podiatry intervention planned.    Thank you, Ashlyn Beard RN [facility]  [phone]  Options provided:  -- Sepsis present as evidenced by, Please document evidence.  -- Sepsis was ruled out after study  -- Other - I will add my own diagnosis  -- Disagree - Not applicable / Not valid  -- Disagree - Clinically unable to determine / Unknown  -- Refer to Clinical Documentation Reviewer    PROVIDER RESPONSE TEXT:    Sepsis is present as evidenced by    Query created by: Ashlyn Beard on 2024 6:42 AM      Electronically signed by:  Mj Kincaid DO 2024 5:52 AM          
4 Eyes Skin Assessment     NAME:  Shauna Gaines  YOB: 1935  MEDICAL RECORD NUMBER:  [id number]    The patient is being assessed for  Admission    I agree that at least one RN has performed a thorough Head to Toe Skin Assessment on the patient. ALL assessment sites listed below have been assessed.      Areas assessed by both nurses:    Head, Face, Ears, Shoulders, Back, Chest, Arms, Elbows, Hands, Sacrum. Buttock, Coccyx, Ischium, Legs. Feet and Heels, and Under Medical Devices     Ecchymosis BUE     Does the Patient have a Wound? Yes wound(s) were present on assessment. LDA wound assessment was Initiated and completed by RN     Wound RLL- cleaned with saline, xeroform, ABD, krillex, ace wrap applied   Johnnie Prevention initiated by RN: Yes  Wound Care Orders initiated by RN: Yes    Pressure Injury (Stage 3,4, Unstageable, DTI, NWPT, and Complex wounds) if present, place Wound referral order by RN under : Yes    New Ostomies, if present place, Ostomy referral order under : No     Nurse 1 eSignature: Electronically signed by Johanna Underwood RN on 6/4/24 at 1:59 PM EDT    **SHARE this note so that the co-signing nurse can place an eSignature**    Nurse 2 eSignature: Electronically signed by Madai Glass RN on 6/4/24 at 2:05 PM EDT   
Comprehensive Nutrition Assessment    Type and Reason for Visit:  Initial, Positive Nutrition Screen (LOS)    Nutrition Recommendations/Plan:   Continue Current Diet  Begin Ashutosh BID  Begion Magic cup BID     Malnutrition Assessment:  Malnutrition Status:  Severe malnutrition (06/10/24 1439)    Context:  Chronic Illness     Findings of the 6 clinical characteristics of malnutrition:  Energy Intake:  Mild decrease in energy intake (Comment) (poor po intake x5 days prior to admission)  Weight Loss:  Greater than 10% over 6 months (-11.6% in 11 months)     Body Fat Loss:  Severe body fat loss Orbital, Triceps, Fat Overlying Ribs   Muscle Mass Loss:  Severe muscle mass loss Temples (temporalis), Clavicles (pectoralis & deltoids), Calf (gastrocnemius)  Fluid Accumulation:  No significant fluid accumulation     Strength:  Not Performed    Nutrition Assessment:    Pt admit w/ sepsis 2/2 Right Lower leg wound, cellulitits. Pt had I&D 06/05/23 w/ wound vac placement. PMHx: Pacemaker, HLD/HTN, A-Fib. Pt's daughter reports fair appetite 51-75% of all meals. Per pt's daughter pt does not like Ensure, will provide magic cup BID, continue inpatient monitoring, f/up per policy.    Nutrition Related Findings:    A/O x4, I/O +2719 since admit, Co2 33, Anion Gap 5, CRP 11.0, Hgb 10.8 Wound Type: Wound Vac, Open Wounds, Deep Tissue Injury       Current Nutrition Intake & Therapies:    Average Meal Intake: 51-75%  Average Supplements Intake: None Ordered  ADULT DIET; Regular; Low Sodium (2 gm); 2000 ml  ADULT ORAL NUTRITION SUPPLEMENT; Lunch, Dinner; Wound Healing Oral Supplement  ADULT ORAL NUTRITION SUPPLEMENT; Lunch, Dinner; Frozen Oral Supplement    Anthropometric Measures:  Height: 152.4 cm (5')  Ideal Body Weight (IBW): 100 lbs (45 kg)    Admission Body Weight: 41.8 kg (92 lb 2.4 oz)  Current Body Weight: 41.8 kg (92 lb 2.4 oz), 92.2 % IBW. Weight Source: Standing Scale (06/04/24)  Current BMI (kg/m2): 18  Usual Body Weight: 
Department of Podiatry  Progress Note    SUBJECTIVE:  Patient seen bedside for Right lower extremity wound. She  was originally scheduled for surgery this afternoon however she needs appropriate cardiac clearance and holding of anticoagulation. Will plan for surgery tomorrow afternoon.  and  No acute events overnight. Patient denies any N/V/D/F/C/SOB/CP and has no other pedal complaints at this time.     -Patient to go for surgery tomorrow 06/05/24, right lower extremity incision and drainage with possible wound VAC application, with Dr. Dexter at 12 PM  -Patient to be NPO at midnight.    OBJECTIVE:    Scheduled Meds:   docusate sodium  100 mg Oral Nightly    [Held by provider] furosemide  40 mg Oral Daily    atorvastatin  10 mg Oral Daily    [Held by provider] apixaban  2.5 mg Oral BID    vancomycin  20 mg/kg IntraVENous Once    ceFAZolin  1,000 mg IntraVENous Q12H    enoxaparin  30 mg SubCUTAneous Daily    metoprolol tartrate  50 mg Oral BID    sodium chloride flush  5-40 mL IntraVENous 2 times per day    arformoterol tartrate  15 mcg Nebulization BID RT    And    budesonide  0.25 mg Nebulization BID RT     Continuous Infusions:   lactated ringers IV soln      sodium chloride       PRN Meds:.HYDROcodone 5 mg - acetaminophen, hydrALAZINE, sodium chloride flush, sodium chloride, potassium chloride **OR** potassium alternative oral replacement **OR** potassium chloride, magnesium sulfate, polyethylene glycol, acetaminophen **OR** acetaminophen    Allergies   Allergen Reactions    Adhesive Tape Other (See Comments)     \"Tears Skin\"        BP (!) 160/73   Pulse (!) 106   Temp 98.4 °F (36.9 °C) (Oral)   Resp 20   Ht 1.524 m (5')   Wt 41.8 kg (92 lb 4 oz)   SpO2 93%   BMI 18.02 kg/m²       EXAM:      EXAM:        Pt is AAOx3, NAD     Right lower extremity focused exam:     Vascular Exam:  DP and PT pulses are faintly palpable.  CFT is <5 seconds to the distal pari of all the toes.  Skin temperature is warm to warm 
Department of Podiatry  Progress Note    SUBJECTIVE:  Patient seen bedside status post incision and drainage with wound VAC application right lower extremity on 06/05/2024.  Daughter is at bedside today.  Patient and daughter relates that she will be transferred to nursing home this afternoon.  Case management note shows that wound VAC has been sent to nursing home.  Unfortunately patient and daughter understand that she will have to undergo wet-to-dry dressing change before discharge to hospital followed by wound VAC reapplication instead of being able to disconnect and reconnect the VAC.. Patient denies any N/V/D/F/C/SOB/CP and has no other pedal complaints at this time.       OBJECTIVE:    Scheduled Meds:   lidocaine  1 patch TransDERmal Daily    gabapentin  100 mg Oral TID    linezolid  600 mg Oral 2 times per day    docusate sodium  100 mg Oral Nightly    [Held by provider] furosemide  40 mg Oral Daily    atorvastatin  10 mg Oral Daily    apixaban  2.5 mg Oral BID    metoprolol tartrate  50 mg Oral BID    sodium chloride flush  5-40 mL IntraVENous 2 times per day    arformoterol tartrate  15 mcg Nebulization BID RT    And    budesonide  0.25 mg Nebulization BID RT     Continuous Infusions:   sodium chloride       PRN Meds:.HYDROcodone 5 mg - acetaminophen, ondansetron **OR** ondansetron, hydrALAZINE, sodium chloride flush, sodium chloride, potassium chloride **OR** potassium alternative oral replacement **OR** potassium chloride, magnesium sulfate, polyethylene glycol, acetaminophen **OR** acetaminophen    Allergies   Allergen Reactions    Adhesive Tape Other (See Comments)     \"Tears Skin\"        /60   Pulse 76   Temp 98.5 °F (36.9 °C) (Oral)   Resp 18   Ht 1.524 m (5')   Wt 41.8 kg (92 lb 4 oz)   SpO2 100%   BMI 18.02 kg/m²       EXAM:  Wound VAC on and running at 125 continuous with black foam    EXAM:        Pt is AAOx3, NAD     Right lower extremity focused exam:     Vascular Exam:  DP and PT 
Department of Podiatry  Progress Note    SUBJECTIVE:  Patient seen bedside status post incision and drainage with wound VAC application right lower extremity on 06/05/2024.  Daughter is at bedside today.  Patient having a lot of pain during dressing change still which is to be expected.  Wound overall appears stable and is granulating well.  They relate that she will be discharged to skilled nursing facility and are awaiting word on discharge.  They understand that they are to follow-up with Dr. Dexter outpatient after discharge.  No acute events overnight. Patient denies any N/V/D/F/C/SOB/CP and has no other pedal complaints at this time.       OBJECTIVE:    Scheduled Meds:   lidocaine  1 patch TransDERmal Daily    gabapentin  100 mg Oral TID    linezolid  600 mg Oral 2 times per day    docusate sodium  100 mg Oral Nightly    [Held by provider] furosemide  40 mg Oral Daily    atorvastatin  10 mg Oral Daily    apixaban  2.5 mg Oral BID    metoprolol tartrate  50 mg Oral BID    sodium chloride flush  5-40 mL IntraVENous 2 times per day    arformoterol tartrate  15 mcg Nebulization BID RT    And    budesonide  0.25 mg Nebulization BID RT     Continuous Infusions:   sodium chloride       PRN Meds:.HYDROcodone 5 mg - acetaminophen, ondansetron **OR** ondansetron, hydrALAZINE, sodium chloride flush, sodium chloride, potassium chloride **OR** potassium alternative oral replacement **OR** potassium chloride, magnesium sulfate, polyethylene glycol, acetaminophen **OR** acetaminophen    Allergies   Allergen Reactions    Adhesive Tape Other (See Comments)     \"Tears Skin\"        /60   Pulse 70   Temp 97.8 °F (36.6 °C) (Infrared)   Resp 18   Ht 1.524 m (5')   Wt 41.8 kg (92 lb 4 oz)   SpO2 96%   BMI 18.02 kg/m²       EXAM:  Wound VAC on and running at 125 continuous with black foam    EXAM:        Pt is AAOx3, NAD     Right lower extremity focused exam:     Vascular Exam:  DP and PT pulses are faintly palpable.  
Department of Podiatry  Progress Note    SUBJECTIVE:  Patient seen bedside status post incision and drainage with wound VAC application right lower extremity on 06/05/2024.  She relates a lto of pain during dressing change today but understands the plan for MWF dressing changes. Her daughter at bedside today relates that she will be going to a SNF on discharge.  No acute events overnight. Patient denies any N/V/D/F/C/SOB/CP and has no other pedal complaints at this time.       OBJECTIVE:    Scheduled Meds:   ampicillin-sulbactam  3,000 mg IntraVENous Q6H    doxycycline hyclate  100 mg Oral 2 times per day    docusate sodium  100 mg Oral Nightly    [Held by provider] furosemide  40 mg Oral Daily    atorvastatin  10 mg Oral Daily    apixaban  2.5 mg Oral BID    metoprolol tartrate  50 mg Oral BID    sodium chloride flush  5-40 mL IntraVENous 2 times per day    arformoterol tartrate  15 mcg Nebulization BID RT    And    budesonide  0.25 mg Nebulization BID RT     Continuous Infusions:   sodium chloride       PRN Meds:.HYDROcodone 5 mg - acetaminophen, ondansetron **OR** ondansetron, hydrALAZINE, sodium chloride flush, sodium chloride, potassium chloride **OR** potassium alternative oral replacement **OR** potassium chloride, magnesium sulfate, polyethylene glycol, acetaminophen **OR** acetaminophen    Allergies   Allergen Reactions    Adhesive Tape Other (See Comments)     \"Tears Skin\"        BP (!) 126/56   Pulse 74   Temp 98.7 °F (37.1 °C)   Resp 18   Ht 1.524 m (5')   Wt 41.8 kg (92 lb 4 oz)   SpO2 98%   BMI 18.02 kg/m²       EXAM:  Wound VAC on and running at 125 continuous with black foam    EXAM:        Pt is AAOx3, NAD     Right lower extremity focused exam:     Vascular Exam:  DP and PT pulses are faintly palpable.  CFT is <5 seconds to the distal pari of all the toes.  Skin temperature is warm to warm from the tibial tuberosity down to the dorsum of all the digits with increased warmth surrounding the 
Department of Podiatry  Progress Note    SUBJECTIVE:  Patient seen bedside status post incision and drainage with wound VAC application right lower extremity on 06/05/2024.  She relates some mild pain to the legs stating that she had the most pain last night however she took a Percocet and felt that that has helped sustained the pain.  She understands that in the immediate postoperative period she is able to continue taking Percocet every 6 hours to stay ahead of the pain.  She is curious about how long wound VAC will need to stay on explained that it will be based on the amount of time that it takes to heal and how well she is doing.  No acute events overnight. Patient denies any N/V/D/F/C/SOB/CP and has no other pedal complaints at this time.       OBJECTIVE:    Scheduled Meds:   ampicillin-sulbactam  3,000 mg IntraVENous Q6H    doxycycline hyclate  100 mg Oral 2 times per day    docusate sodium  100 mg Oral Nightly    [Held by provider] furosemide  40 mg Oral Daily    atorvastatin  10 mg Oral Daily    apixaban  2.5 mg Oral BID    metoprolol tartrate  50 mg Oral BID    sodium chloride flush  5-40 mL IntraVENous 2 times per day    arformoterol tartrate  15 mcg Nebulization BID RT    And    budesonide  0.25 mg Nebulization BID RT     Continuous Infusions:   lactated ringers IV soln 60 mL/hr at 06/05/24 1943    sodium chloride       PRN Meds:.ondansetron **OR** ondansetron, HYDROcodone 5 mg - acetaminophen, hydrALAZINE, sodium chloride flush, sodium chloride, potassium chloride **OR** potassium alternative oral replacement **OR** potassium chloride, magnesium sulfate, polyethylene glycol, acetaminophen **OR** acetaminophen    Allergies   Allergen Reactions    Adhesive Tape Other (See Comments)     \"Tears Skin\"        /60   Pulse 76   Temp 98.4 °F (36.9 °C) (Oral)   Resp 17   Ht 1.524 m (5')   Wt 41.8 kg (92 lb 4 oz)   SpO2 97%   BMI 18.02 kg/m²       EXAM:  Wound VAC on and running at 125 continuous with 
Department of Podiatry  Progress Note    SUBJECTIVE:  Patient seen bedside status post incision and drainage with wound VAC application right lower extremity on 06/05/2024.  She still understands the plan for MWF dressing changes. Her daughter at bedside today relates that she will be going to a SNF on discharge.  I updated the daughter on the plan moving forward and showed her a photo of what the wound looks like after surgery.  The daughter is pleased with how the wound looks.  Patient states that her leg feels sore.  I explained to her that this is normal after surgery and the pain should continue to decrease.  No acute events overnight. Patient denies any N/V/D/F/C/SOB/CP and has no other pedal complaints at this time.       OBJECTIVE:    Scheduled Meds:   lidocaine  1 patch TransDERmal Daily    gabapentin  100 mg Oral TID    tetanus & diphtheria toxoids (adult)  0.5 mL IntraMUSCular Once    linezolid  600 mg Oral 2 times per day    docusate sodium  100 mg Oral Nightly    [Held by provider] furosemide  40 mg Oral Daily    atorvastatin  10 mg Oral Daily    apixaban  2.5 mg Oral BID    metoprolol tartrate  50 mg Oral BID    sodium chloride flush  5-40 mL IntraVENous 2 times per day    arformoterol tartrate  15 mcg Nebulization BID RT    And    budesonide  0.25 mg Nebulization BID RT     Continuous Infusions:   sodium chloride       PRN Meds:.HYDROcodone 5 mg - acetaminophen, ondansetron **OR** ondansetron, hydrALAZINE, sodium chloride flush, sodium chloride, potassium chloride **OR** potassium alternative oral replacement **OR** potassium chloride, magnesium sulfate, polyethylene glycol, acetaminophen **OR** acetaminophen    Allergies   Allergen Reactions    Adhesive Tape Other (See Comments)     \"Tears Skin\"        BP (!) 117/56   Pulse 66   Temp 98.7 °F (37.1 °C) (Oral)   Resp 18   Ht 1.524 m (5')   Wt 41.8 kg (92 lb 4 oz)   SpO2 98%   BMI 18.02 kg/m²       EXAM:  Wound VAC on and running at 125 
Dr. Jones notified regarding patients wanting code status changed from a full code to a limited code with accepting meds only  but refusing chest compressions, ventilation, and defibrilation. Refusing to go to OR unless code status changed per patients and 3 daughters visiting in room.  
Instructed patient to ask someone to bring a copy of Advance Directive. Patient said she will call her daughter to bring it.  
Internal Medicine Progress Note     [facility]     Austen Noguera D.O., MIKE Jones D.O., MIKE Francis D.O.     Lupe Reid, MSN, APRN, NP-C  Dann Lin, MSN, APRN-CNP  Kasi De Paz, MSN, APRN, NP-C  Yvonne Epperson, MSN, APRN-CNP  Jacinda Zhu, MSN, APRN, NP-C     Primary Care Physician: Ross Mercer MD   Admitting Physician:  Austen Noguera DO  Admission date and time: 6/3/2024  5:21 PM    Room:  14/14  Admitting diagnosis: Cellulitis [L03.90]    Patient Name: Shauna Gaines  MRN: [id number]    Date of Service: 6/4/2024     Subjective:  Shauna is a 89 y.o. female who was seen and examined today,6/4/2024, at the bedside.  Pain is more well-controlled at present.  We have reviewed the results of the workup and plan of care moving forward.  She is aware that we will request cardiac evaluation and clearance prior to the procedure given her severe valvular disease.  She is also only been 24 hours off of Eliquis as of this morning.  We discussed additional day of holding prior to  consideration of surgery.  No additional complaints or concerns. No family present during my examination.    Review of systems:  Constitutional:   + fatigue and malaise , - fever/chills  HEENT:   Denies ear pain, sore throat, sinus or eye problems.  Cardiovascular:   - chest pain or palpitations. + Atrial fibrillation on Eliquis therapy.  Respiratory:   - dyspnea at rest, - dyspnea on exertion, - coughing, - sputum, - hemoptysis  Gastrointestinal:   - nausea, -vomiting. - diarrhea, - constipation. - poor appetite and poor intake. - abdominal pain.  Genitourinary:    Denies any urgency, frequency, hematuria. Voiding  without difficulty.  Extremities:   Denies lower extremity swelling, edema or cyanosis.   Neurology:    Denies any headache or focal neurological deficits, positive for generalized weakness and fatigue without focal component  Psch:   Denies 
Internal Medicine Progress Note     [facility]     Austen Noguera D.O., MIKE Jones D.O., MIKE Francis D.O.     Lupe Reid, MSN, APRN, NP-C  Dann Lin, MSN, APRN-CNP  Kasi De Paz, MSN, APRN, NP-C  Yvonne Epperson, MSN, APRN-CNP  Jacinda Zhu, MSN, APRN, NP-C     Primary Care Physician: Ross Mercer MD   Admitting Physician:  Austen Noguera DO  Admission date and time: 6/3/2024  5:21 PM    Room:  [address] [place] [address]  Admitting diagnosis: Cellulitis [L03.90]  Cellulitis of right lower extremity [L03.115]  Wound of right lower extremity, initial encounter [S81.801A]    Patient Name: Shauna Gaines  MRN: [id number]    Date of Service: 6/9/2024     Subjective:  Shauna is a 89 y.o. female who was seen and examined today,6/9/2024, at the bedside.  The patient is sitting up in chair with her family at the bedside.  Dressing to the right lower extremity with wound VAC is dry and intact.  She states her pain is better controlled today since the start of the Neurontin yesterday.    Review of systems:  Constitutional:   + fatigue and malaise , - fever/chills  HEENT:   Denies ear pain, sore throat, sinus or eye problems.  Cardiovascular:   - chest pain or palpitations. + Atrial fibrillation on Eliquis therapy.  Respiratory:   - dyspnea at rest, - dyspnea on exertion, - coughing, - sputum, - hemoptysis  Gastrointestinal:   - nausea, -vomiting. - diarrhea, - constipation. - poor appetite and poor intake. - abdominal pain.  Genitourinary:    Denies any urgency, frequency, hematuria. Voiding  without difficulty.  Extremities:   Denies lower extremity swelling, edema or cyanosis.   Neurology:    Denies any headache or focal neurological deficits, positive for generalized weakness and fatigue without focal component  Psch:   Denies being anxious or depressed.  Musculoskeletal:    Denies  myalgias, joint complaints or back pain.   Integumentary: 
Internal Medicine Progress Note     [facility]     Austen Noguera D.O., MIKE Jones D.O., MIKE Francis D.O.     Lupe Reid, MSN, APRN, NP-C  Dann Lin, MSN, APRN-CNP  Kasi De Paz, MSN, APRN, NP-C  Yvonne Epperson, MSN, APRN-CNP  Jacinda Zhu, MSN, APRN, NP-C     Primary Care Physician: Ross Mercer MD   Admitting Physician:  Austen Noguera DO  Admission date and time: 6/3/2024  5:21 PM    Room:  [address] [place] [address]  Admitting diagnosis: Cellulitis [L03.90]  Cellulitis of right lower extremity [L03.115]  Wound of right lower extremity, initial encounter [S81.801A]    Patient Name: Shauna Gaines  MRN: [id number]    Date of Service: 6/5/2024     Subjective:  Shauna is a 89 y.o. female who was seen and examined today,6/5/2024, at the bedside.  Her pain is more well-controlled.  She was evaluated by cardiology yesterday and their recommendations have been.  She is agreeable moving forward with said risk and we await formal cardiology clearance as she is otherwise at her baseline risk from internal medicine standpoint. No additional complaints or concerns. No family present during my examination.    Review of systems:  Constitutional:   + fatigue and malaise , - fever/chills  HEENT:   Denies ear pain, sore throat, sinus or eye problems.  Cardiovascular:   - chest pain or palpitations. + Atrial fibrillation on Eliquis therapy.  Respiratory:   - dyspnea at rest, - dyspnea on exertion, - coughing, - sputum, - hemoptysis  Gastrointestinal:   - nausea, -vomiting. - diarrhea, - constipation. - poor appetite and poor intake. - abdominal pain.  Genitourinary:    Denies any urgency, frequency, hematuria. Voiding  without difficulty.  Extremities:   Denies lower extremity swelling, edema or cyanosis.   Neurology:    Denies any headache or focal neurological deficits, positive for generalized weakness and fatigue without focal component  Psch: 
Internal Medicine Progress Note     [facility]     Austen oNguera D.O., MIKE Jones D.O., MIKE Francis D.O.     Lupe Reid, MSN, APRN, NP-C  Dann Lin, MSN, APRN-CNP  Kasi De Paz, MSN, APRN, NP-C  Yvonne Epperson, MSN, APRN-CNP  Jacinda Zhu, MSN, APRN, NP-C     Primary Care Physician: Ross Mercer MD   Admitting Physician:  Austen Noguera DO  Admission date and time: 6/3/2024  5:21 PM    Room:  [address] [place] [address]  Admitting diagnosis: Cellulitis [L03.90]  Cellulitis of right lower extremity [L03.115]  Wound of right lower extremity, initial encounter [S81.801A]    Patient Name: Shauna Gaines  MRN: [id number]    Date of Service: 6/7/2024     Subjective:  Shauna is a 89 y.o. female who was seen and examined today,6/7/2024, at the bedside.  Postoperative discomfort is well-controlled.  We have reviewed the results of the workup and plan of care moving forward with the patient and her daughter who is present at bedside.  She voices no additional symptoms or concerns.  They remain agreeable for skilled nursing facility placement and this is being arranged. No family present during my examination.    Review of systems:  Constitutional:   + fatigue and malaise , - fever/chills  HEENT:   Denies ear pain, sore throat, sinus or eye problems.  Cardiovascular:   - chest pain or palpitations. + Atrial fibrillation on Eliquis therapy.  Respiratory:   - dyspnea at rest, - dyspnea on exertion, - coughing, - sputum, - hemoptysis  Gastrointestinal:   - nausea, -vomiting. - diarrhea, - constipation. - poor appetite and poor intake. - abdominal pain.  Genitourinary:    Denies any urgency, frequency, hematuria. Voiding  without difficulty.  Extremities:   Denies lower extremity swelling, edema or cyanosis.   Neurology:    Denies any headache or focal neurological deficits, positive for generalized weakness and fatigue without focal 
OCCUPATIONAL THERAPY Re-Evaluation  [facility]  [address] [place]. [place]        Date:2024                                                  Patient Name: Shauna Gaines    MRN: [id number]    : 1935    Room: [address] [place] [address]      Evaluating OT: America Murillo OTR/L; [id number]     Referring Provider and Specific Provider Orders/Date:     24 0845  OT eval and treat  (PT/OT CONSULT PANEL)  ONE TIME     Mj Jones,             24  OT eval and treat  Start:  24,   End:  24,   ONE TIME,   Standing Count:  1 Occurrences,   R         Dann Lin, AZALEA - CNP      Placement Recommendation: Subacute        Diagnosis:   1. Wound of right lower extremity, initial encounter    2. Cellulitis of right lower extremity    3. Cellulitis, unspecified cellulitis site         Surgery: anticipate I&D of R LE wound and possible wound vac placement       Pertinent Medical History:       Past Medical History:   Diagnosis Date    Aortic valve stenosis     Asthma     COPD (chronic obstructive pulmonary disease) ([facility])     COVID 2022    Hearing aid worn     Heart murmur     Hyperlipidemia     Hypertension          Past Surgical History:   Procedure Laterality Date    APPENDECTOMY      BACK SURGERY      x3    CHOLECYSTECTOMY      HERNIA REPAIR      HIP ARTHROPLASTY Bilateral     LEG SURGERY Right 2024    RIGHT LEG INCISION AND DRAINAGE POSSIBLE WOUND VAC APPLICATION performed by Jared Dexter DPM at [facility] OR    PACEMAKER INSERTION  2024    TONSILLECTOMY        Precautions:  Fall Risk, 3L O2, I&D to distal R LE, wound vac, NWB: R LE      Assessment of current deficits:     [x] Functional mobility  [x]ADLs  [x] Strength               []Cognition    [x] Functional transfers   [x] IADLs         [] Safety Awareness   [x]Endurance    [] Fine Coordination              [x] Balance      [] Vision/perception   []Sensation     []Gross Motor 
OR staff member notified that Code status changed from a full code to a limited code with meds only permitted  
Patient to have incision and drainage right lower extremity this afternoon.  Patient and family member in room understand and are amenable to the procedure moving forward.  All questions and concerns addressed.  
Physical Therapy    Physical Therapy Initial Evaluation/Plan of Care    Room #:  0437/0437-02  Patient Name: Shauna Gaines  YOB: 1935  MRN: [id number]    Date of Service: 6/4/2024     Tentative placement recommendation: Home with Home Health Physical Therapy  Equipment recommendation: Patient has needed equipment       Evaluating Physical Therapist: Muriel Edwards, Luke Physical Therapist      Specific Provider Orders/Date/Referring Provider : 06/04/24 0715    PT eval and treat  Start:  06/04/24 0715,   End:  06/04/24 0715,   ONE TIME,   Standing Count:  1 Occurrences,   R       Dann Lin, APRN - CNP    Admitting Diagnosis:   Cellulitis [L03.90]  Cellulitis of right lower extremity [L03.115]  Wound of right lower extremity, initial encounter [S81.801A]    Admitted from wound care clinic for wound check on anterior right lower extremity     Surgery: none    Patient Active Problem List   Diagnosis    Choledocholithiasis    Dilated cbd, acquired    Cellulitis of right leg    Cellulitis    Wound of right leg       ASSESSMENT of Current Deficits Patient exhibits decreased strength, balance, endurance, and pain right lower extremity  impairing functional mobility, transfers, gait , gait distance, and tolerance to activity are barriers to d/c and require skilled intervention to address concerns listed above to increase safety and independence at discharge.Decreased strength, balance and endurance  increases patient's risk for fall.     Patient able to perform transfers and gait with minimal assist needed for safety and monitoring of heart rate with activity. Patient with increased heart rate of 146 bpm with short walk to bathroom in room. Patient reporting no symptoms of tachycardia such as chest pain, palpitations, or shortness of breath. Patient able to decrease heart rate to 110 bpm with cues for pursed lip breathing and seated rest within 5 minutes. Limited ambulation distance this date due to 
Physical Therapy    Physical Therapy Treatment Note/Plan of Care    Room #:  0437/0437-02  Patient Name: Shauna Gaines  YOB: 1935  MRN: [id number]    Date of Service: 6/5/2024     Tentative placement recommendation: Home with Home Health Physical Therapy  Equipment recommendation: Patient has needed equipment       Evaluating Physical Therapist: Muriel Edwards, Luke Physical Therapist      Specific Provider Orders/Date/Referring Provider : 06/04/24 0715    PT eval and treat  Start:  06/04/24 0715,   End:  06/04/24 0715,   ONE TIME,   Standing Count:  1 Occurrences,   R       Dann Lin, APRN - CNP    Admitting Diagnosis:   Cellulitis [L03.90]  Cellulitis of right lower extremity [L03.115]  Wound of right lower extremity, initial encounter [S81.801A]    Admitted from wound care clinic for wound check on anterior right lower extremity     Surgery: none    Patient Active Problem List   Diagnosis    Choledocholithiasis    Dilated cbd, acquired    Cellulitis of right leg    Cellulitis    Wound of right leg       ASSESSMENT of Current Deficits Patient exhibits decreased strength, balance, endurance, and pain right lower extremity  impairing functional mobility, transfers, gait , gait distance, and tolerance to activity are barriers to d/c and require skilled intervention to address concerns listed above to increase safety and independence at discharge.Decreased strength, balance and endurance  increases patient's risk for fall.     Patient able to perform transfers and gait with supervision using a single point cane. Her SPO2 dropped to 88% after ambulation on 3L but recovered to 90% and higher within 10 seconds. Cueing for pursed lip breathing. Patient requires skilled monitoring of patient's response and vital signs during treatment session. However, patient with good potential to progress therapeutic activity given overall stability with single point cane during gait and prior level of function at 
Physical Therapy    Physical Therapy Treatment Note/Plan of Care    Room #:  0437/0437-02  Patient Name: Shauna Gaines  YOB: 1935  MRN: [id number]    Date of Service: 6/9/2024     Tentative placement recommendation: Subacute Rehab  Equipment recommendation: To be determined      Evaluating Physical Therapist: Muriel Edwards, Student Physical Therapist    Reevaluating Physical Therapist:Daphne Estrella, MARISOL   Specific Provider Orders/Date/Referring Provider : 06/04/24 0715    PT eval and treat  Start:  06/04/24 0715,   End:  06/04/24 0715,   ONE TIME,   Standing Count:  1 Occurrences,   R       Dann Lin, APRN - CNP    Admitting Diagnosis:   Cellulitis [L03.90]  Cellulitis of right lower extremity [L03.115]  Wound of right lower extremity, initial encounter [S81.801A]    Admitted from wound care clinic for wound check on anterior right lower extremity     Surgery:     Date of Procedure: 6/5/2024 at 1:15 PM     Pre-Op Diagnosis Codes:     * Cellulitis, unspecified cellulitis site [L03.90]     Post-Op Diagnosis: Same       Procedure(s):  RIGHT LEG INCISION AND DRAINAGE POSSIBLE WOUND VAC APPLICATION     Surgeon(s):  Jared Dexter DPM Patel, Neathie, DPM  Patient Active Problem List   Diagnosis    Choledocholithiasis    Dilated cbd, acquired    Cellulitis of right leg    Cellulitis    Wound of right leg       ASSESSMENT of Current Deficits Patient exhibits decreased strength, balance, endurance, and pain right lower extremity  impairing functional mobility, transfers, gait , gait distance, and tolerance to activity are barriers to d/c and require skilled intervention to address concerns listed above to increase safety and independence at discharge.Decreased strength, balance and endurance  increases patient's risk for fall. Patient able to maintain NWB RLE and heel/toe shuffle steps to bedside chair and use of wheeled walker. Patient without loss of balance. Slightly short of breath after moving to 
Physical Therapy  Physical Therapy    Room #:   0437/0437-02    Date: 2024       Patient Name: Shauna Gaines  : 1935      MRN: [id number]     Patient unavailable for physical therapy treatment due to  patient was sleeping and I did not attempt to wake her up . Will attempt PT treatment tomorrow. Thank you.      Naren Jimenez  [facility]  LIC # [id number]        
complaints or back pain.   Integumentary:   The right lower extremity has expected degree of discomfort postoperatively with extensive dressing and wound VAC in place.  Otherwise, denies any rashes, ulcers, or excoriations.  Denies bruising.  Hematologic/Lymphatic:  Denies unexplained bruising or bleeding.      Physical Exam:  I/O this shift:  In: 420 [P.O.:420]  Out: 100 [Urine:100]    Intake/Output Summary (Last 24 hours) at 6/8/2024 1806  Last data filed at 6/8/2024 1407  Gross per 24 hour   Intake 723.33 ml   Output 300 ml   Net 423.33 ml     I/O last 3 completed shifts:  In: 2179.9 [P.O.:580; I.V.:854.9; IV Piggyback:745]  Out: 200 [Urine:200]  No data found.    Vital Signs:   Blood pressure (!) 145/74, pulse 75, temperature 97.9 °F (36.6 °C), temperature source Oral, resp. rate 18, height 1.524 m (5'), weight 41.8 kg (92 lb 4 oz), SpO2 98 %.    General appearance:  Alert, responsive, oriented to person, place, and time.  More comfortable appearance, no distress.  Head:  Normocephalic. No masses, lesions or tenderness.  Eyes:  PERRLA.  EOMI.  Sclera clear.    ENT:  Ears normal. Mucosa normal.  Neck:    Supple. Trachea midline. No thyromegaly. No JVD. No bruits.  Heart:    Rhythm regular. Rate controlled.  S1 and S2.  Systolic murmur.  Lungs:    Symmetrical. Clear to auscultation bilaterally.  No wheezes. No rhonchi. No rales.  Abdomen:   Soft. Non-tender. Non-distended. Bowel sounds positive. No organomegaly or masses.  No pain on palpation.  Extremities:    Peripheral pulses present.  Right lower extremity dressing with wound VAC, wrap overlying is in place.  No significant pitting peripheral edema.  No ulcers. No cyanosis. No clubbing.  Neurologic:    Alert x 3.  No focal deficit.  Cranial nerves grossly intact. No focal weakness.  Psych:   Behavior is normal. Mood appears normal. Speech is not rapid and/or pressured.  Musculoskeletal:   No unilateral joint edema, erythema or warmth. Gait not 
joint complaints or back pain.   Integumentary:   Wound VAC to right lower extremity  Hematologic/Lymphatic:  Denies unexplained bruising or bleeding.      Physical Exam:  I/O this shift:  In: 900 [P.O.:900]  Out: 300 [Stool:300]    Intake/Output Summary (Last 24 hours) at 6/10/2024 1759  Last data filed at 6/10/2024 1744  Gross per 24 hour   Intake 900 ml   Output 500 ml   Net 400 ml   I/O last 3 completed shifts:  In: 120 [P.O.:120]  Out: 200 [Urine:200]  No data found.    Vital Signs:   Blood pressure (!) 115/49, pulse 60, temperature 97.8 °F (36.6 °C), temperature source Oral, resp. rate 17, height 1.524 m (5'), weight 41.8 kg (92 lb 4 oz), SpO2 95 %.    General appearance:  Alert, responsive, oriented to person, place, and time.  More comfortable appearance, no distress.  Head:  Normocephalic. No masses, lesions or tenderness.  Eyes:  PERRLA.  EOMI.  Sclera clear.    ENT:  Ears normal. Mucosa normal.  Neck:    Supple. Trachea midline. No thyromegaly. No JVD. No bruits.  Heart:    Rhythm regular. Rate controlled.  S1 and S2.  Systolic murmur.  Lungs:    Symmetrical. Clear to auscultation bilaterally.  No wheezes. No rhonchi. No rales.  Abdomen:   Soft. Non-tender. Non-distended. Bowel sounds positive. No organomegaly or masses.  No pain on palpation.  Extremities:    Peripheral pulses present.  Right lower extremity dressing with wound VAC, wrap overlying is in place.  No significant pitting peripheral edema.  No ulcers. No cyanosis. No clubbing.  Neurologic:    Alert x 3.  No focal deficit.  Cranial nerves grossly intact. No focal weakness.  Psych:   Behavior is normal. Mood appears normal. Speech is not rapid and/or pressured.  Musculoskeletal:   No unilateral joint edema, erythema or warmth. Gait not assessed.  Integumentary:  Wound VAC to right lower extremities.  Mild fluid accumulation in the left arm pulses good  Genitalia/Breast:  Deferred    Medication:  Scheduled Meds:   lidocaine  1 patch TransDERmal 
(!) 145/74   Pulse 75   Temp 97.9 °F (36.6 °C) (Oral)   Resp 18   Ht 1.524 m (5')   Wt 41.8 kg (92 lb 4 oz)   SpO2 98%   BMI 18.02 kg/m²     LABS:    Recent Labs     06/07/24  0635 06/08/24  0755   WBC 6.5 7.0   HGB 11.2* 11.0*   HCT 37.3 36.1    162          Recent Labs     06/08/24  0755      K 4.3      CO2 26   PHOS 3.1   BUN 10   CREATININE 0.6          No results for input(s): \"PROT\", \"INR\", \"APTT\" in the last 72 hours.        ASSESSMENT:    ASSESSMENT:  - Right lower extremity wound- infected  - Cellulitis  - Infection in right lower extremity secondary to dog contact  - COPD  - Heart murmur, history of aortic valve stenosis  - Hypertension  - former smoker  - Status post incision and drainage with wound VAC application right lower extremity 06/05/2024         PLAN:  -Patient seen and evaluated at bedside, all charts and labs reviewed.  -Cultures: Surgical cultures shows Staphylococcus aureus, Enterococcus faecalis and Staphylococcus epidermidis  -ABX per ID: Ampicillin-Sulbactam and Doxycycline  -Continue non weight bearing to the operative limb.  -Dressing: Wound  mmHg continuous black foam Monday Wednesday Friday changes    -Discussed with  Jared Dexter DPM FACFAS  Fellowship-Trained Foot and Ankle Surgeon  Diplomate, [employer] Board of Foot and Ankle Surgeons  [phone]       Thank you for involving podiatry in this patients care. Please do not hesitate to call with any questions or concerns     Matthew Walsh DPM PGY1  [phone]  06/08/24    
      Wound Documentation:   Wound 06/03/24 Pretibial Right #1 (Active)   Wound Image   06/03/24 1523   Dressing Status New dressing applied 06/03/24 1600   Wound Cleansed Cleansed with saline 06/03/24 1600   Dressing/Treatment Xeroform;Nasal dressing;Moist to moist;Eye pad;Cotton;ABD;Dry dressing 06/03/24 1600   Wound Length (cm) 8 cm 06/03/24 1523   Wound Width (cm) 9.5 cm 06/03/24 1523   Wound Depth (cm) 0.2 cm 06/03/24 1523   Wound Surface Area (cm^2) 76 cm^2 06/03/24 1523   Wound Volume (cm^3) 15.2 cm^3 06/03/24 1523   Wound Assessment Dusky;Bleeding;Pink/red 06/03/24 1523   Drainage Amount Copious (>75 % saturated) 06/03/24 1523   Drainage Description Yellow;Sanguinous 06/03/24 1523   Odor None 06/03/24 1523   Jess-wound Assessment Maceration;Hyperpigmented 06/03/24 1523   Number of days: 0     Assessment:  Sepsis secondary to lower extremity wound infection and cellulitis status post incision and drainage of right leg abscess, excisional wound debridement and application of wound VAC June 5, 2024 by Dr. Dexter  COPD/asthma without exacerbation  Essential hypertension  Hyperlipidemia  Sick sinus syndrome with pacemaker in situ  Paroxysmal atrial fibrillation on chronic Eliquis, last dose morning of 6/3/2024  Valvular heart disease with mild aortic stenosis, mild mitral stenosis with severe annular calcification, severe tricuspid regurgitation, and moderate pulm hypertension    Plan:   Shauna tolerated podiatric intervention with expected degree of discomfort we have encouraged her to use pain medications.  She will work with the therapy teams we will determine skilled nursing facility placement.  Dose-reduce Eliquis has been resumed and blood counts are stable.  Fluids will be discontinued later today as the patient is advancing diet.   Underlying co-morbidites will be addressed during hospitalization as well. Labs and vital signs will be monitored closely and addressed accordingly.  Continue current therapy.  
Supervision     Scooting: Supervision    Using rails and head of bed elevated:     Rolling: Supervision    Supine to sit: Supervision    Sit to supine: Supervision    Scooting: Supervision    Using rails and head of bed elevated:     Rolling: Not assessed    Supine to sit: Not assessed    Sit to supine: Not assessed    Scooting: Not assessed     Rolling: Independent    Supine to sit: Independent    Sit to supine: Independent    Scooting: Independent     Transfers Sit to stand: Minimal assist of 1  Sit to stand: Moderate assist of 1 first rep  minimal assist 2-3 rep from chair  non weight bearing Right lower extremity Sit to stand: Not assessed     Sit to stand: Supervision  non weight bearing Right lower extremity     Ambulation     2 x 15 feet using  cane with Minimal assist of 1   for safety, Patient with flexed posture and step to gait pattern, and cues for upright posture and pursed lip breathing 2 x 4 heel toe steps using  wheeled walker with Moderate progressing to minimal assist   for walker control and balance, Patient with  ability to maintain NWB (non-weight bearing) right lower extremity, and cues for sequencing and safety   not assessed      5 feet using  wheeled walker with Supervision    non weight bearing Right lower extremity     Stair negotiation: ascended and descended   Not assessed          ROM Within functional limits     Increase range of motion 10% of affected joints    Strength BUE:   4/5  RLE:  4+/5  LLE:  4+/5   Increase strength in affected mm groups by 1/3 grade   Balance Sitting EOB:  good   Dynamic Standing:  fair+ Sitting EOB: good    Dynamic Standing: fair wheeled walker non weight bearing Right lower extremity   Sitting EOB: not assessed   Dynamic Standing: not assessed    Sitting EOB:  good   Dynamic Standing: fair+ non weight bearing Right lower extremity        Patient is Alert & Oriented x person, place, time, and situation and follows directions    Sensation:  Patient  denies 
  Therapeutic Activities 23138 10   1    Therapeutic Ex 84225       Orthotic Management 79459       Manual 81606     Neuro Re-Ed 29904       Non-Billable Time        Evaluation Time additionally includes thorough review of current medical information, gathering information on past medical history/social history and prior level of function, interpretation of standardized testing/informal observation of tasks, assessment of data and development of plan of care and goals.        Evaluating OT: America Murillo OTR/L; [id number]               
(6/4/2024)    Hunger Vital Sign     Worried About Running Out of Food in the Last Year: Never true     Ran Out of Food in the Last Year: Never true   Transportation Needs: No Transportation Needs (6/4/2024)    PRAPARE - Transportation     Lack of Transportation (Medical): No     Lack of Transportation (Non-Medical): No   Physical Activity: Not on file   Stress: Not on file   Social Connections: Not on file   Intimate Partner Violence: Not on file   Housing Stability: Low Risk  (6/4/2024)    Housing Stability Vital Sign     Unable to Pay for Housing in the Last Year: No     Number of Places Lived in the Last Year: 2     Unstable Housing in the Last Year: No       Family History:   History reviewed. No pertinent family history.    Review of Systems:   As per HPI    PHYSICAL EXAM:   BP Range Last 24 hours:   Heart Rate Range Last 24 hours:    BP (!) 126/56   Pulse 74   Temp 98.7 °F (37.1 °C)   Resp 18   Ht 1.524 m (5')   Wt 41.8 kg (92 lb 4 oz)   SpO2 98%   BMI 18.02 kg/m²   CONST:  Well developed, well nourished who appears of stated age. Awake, alert and cooperative. No apparent distress.   HEENT:   Head- Normocephalic, atraumatic   Eyes- Conjunctivae pink  Throat- Oral mucosa pink and moist  Neck-  No stridor, trachea midline, no jugular venous distention. No carotid bruit.    CHEST: Chest symmetrical and non-tender to palpation. Respirations unlabored.  RESPIRATORY: Lung sounds - clear throughout fields   CARDIOVASCULAR:     Heart Inspection- shows no noted pulsations  Heart Palpation- no heaves or thrills; PMI is non-displaced   Heart Ausculation-tachycardic, no murmur. No s3, s4 or rub   PV: No lower extremity edema. Bandage to RLE, underlying wound.   ABDOMEN: Soft, non-tender to light palpation. Bowel sounds present. No palpable masses   MS: Good muscle strength and tone. No atrophy or abnormal movements.   : Deferred  SKIN: Warm and dry no statis dermatitis or ulcers   NEURO / PSYCH: Oriented to 
situation and follows directions    Sensation:  Patient  denies numbness/tingling   Edema:  yes right lower extremity   Endurance: fair      Vitals:  3 liters nasal cannula   Blood Pressure at rest  Blood Pressure during session    Heart Rate at rest  Heart Rate during session 93      SPO2 at rest 98%  SPO2 during session 96%     Patient education  Patient educated on role of Physical Therapy, risks of immobility, safety and plan of care,  importance of mobility while in hospital , purse lip breathing, ankle pumps, quad set and glut set for edema control, blood clot prevention, safety , and weight bearing status      Patient response to education:   Pt verbalized understanding Pt demonstrated skill Pt requires further education in this area   Yes Partial Yes      Treatment:  Patient practiced and was instructed/facilitated in the following treatment: Patient assisted to edge of bed,   Sat edge of bed 5 minutes with Supervision  to increase dynamic sitting balance and activity tolerance. Stood with heel toe steps to bedside chair with bedpan, assisted on/off bedpan and hygiene. Returned to bed.      Therapeutic Exercises:  ankle pumps, quad sets, and glut sets x 15-20    At end of session, patient in bed with daughter present and right lower extremity elevated with ice  call light and phone within reach,  all lines and tubes intact, nursing notified.      Patient would benefit from continued skilled Physical Therapy to improve functional independence and quality of life.     Patient's/ family goals   home    Time in  1421  Time out 1506    Total Treatment Time  25 minutes    CPT codes:  Re-Eval Est Plan Care (49669)  Therapeutic activities (37367)   15 minutes  1 unit(s)  Gait Training (53185) 10 minutes 1 unit(s),   Daphne Estrella, PT #[id number]     
follow. Please do not hesitate to call [phone] for any questions or concerns.    Electronically signed by Kelly Briones MD on 6/9/2024 at 9:54 AM

## 2024-06-11 NOTE — DISCHARGE INSTRUCTIONS
PODIATRY  -Dressings to remain clean dry and intact.  Monday Wednesday Friday wound VAC changes 125 mmHg continuous black foam  -Patient to remain weightbearing as tolerated to the operative lower extremity.  Use caution as hose for wound VAC may pose a tripping hazard.  -Take all medications as prescribed  -Please schedule follow-up appointment with Dr. Dexter wound care at [facility] on the Monday after discharge

## 2024-06-11 NOTE — PLAN OF CARE
Problem: Safety - Adult  Goal: Free from fall injury  6/11/2024 1804 by Merary Archer, RN  Outcome: Completed     Problem: Pain  Goal: Verbalizes/displays adequate comfort level or baseline comfort level  6/11/2024 1804 by Merary Archer, RN  Outcome: Completed     Problem: ABCDS Injury Assessment  Goal: Absence of physical injury  Outcome: Completed     Problem: Discharge Planning  Goal: Discharge to home or other facility with appropriate resources  Outcome: Completed     Problem: Skin/Tissue Integrity  Goal: Absence of new skin breakdown  Outcome: Completed     Problem: Nutrition Deficit:  Goal: Optimize nutritional status  Outcome: Completed

## 2024-06-12 ENCOUNTER — TELEPHONE (OUTPATIENT)
Dept: CARDIOLOGY CLINIC | Age: 89
End: 2024-06-12

## 2024-06-12 LAB
MICROORGANISM SPEC CULT: NORMAL
SERVICE CMNT-IMP: NORMAL
SPECIMEN DESCRIPTION: NORMAL

## 2024-06-12 NOTE — TELEPHONE ENCOUNTER
Patient at [facility]. Patient metoprolol was 50mg BID prn for a heart rate over 80. Patient had a pacer placed in March by . On her discharge papers from admission, it still said 50 mg prn but reading 's consult, it states to make it standing. Can you clarify the order for her metoprolol please?

## 2024-06-12 NOTE — DISCHARGE SUMMARY
Dictate [id number]  
following; the blood pressure was 114/48, temperature 98.6, pulse 80, respirations 18, O2 saturation 97%.  Height 5 feet, weight 92 pounds.  The patient was discharged on following medications:  Gabapentin 100 mg t.i.d. for neuropathy pain, Norco 5/325 q.6 p.r.n. during dressing.  The patient had a 4% lidocaine patch placed to the right leg.  The patient will continue on Zyvox 600 mg b.i.d. for 7 days, Lasix 20 mg daily, albuterol 2 puffs q.4 p.r.n., doxycycline 100 q.12, Eliquis 2.5 b.i.d., metoprolol-XL 50 mg b.i.d., oxygen at 2 L nocturnally, potassium 20 daily, simvastatin 20 mg daily, Symbicort 80/4.5 two puffs q.12 hours, vitamin D 2000 units daily.  The patient will follow up with her primary care doctor upon discharge.  It is Dr. Ross Mercer.  The patient will also follow up with Dr. Dexter for wound care.  More than 40 minutes was spent on the day of discharge, more than 50% of the time spent face-to-face with the patient, discussing plan of care and treatment time along with family members present at the bedside.          JULIAN QUINTANA DO      D:  06/11/2024 17:32:46     T:  06/12/2024 05:03:15     BEHZAD/GENESIS  Job #:  [id number]     Doc#:  [id number]

## 2024-06-13 ENCOUNTER — APPOINTMENT (OUTPATIENT)
Dept: GENERAL RADIOLOGY | Age: 89
DRG: 193 | End: 2024-06-13
Payer: MEDICARE

## 2024-06-13 ENCOUNTER — HOSPITAL ENCOUNTER (INPATIENT)
Age: 89
LOS: 1 days | Discharge: HOME | DRG: 193 | End: 2024-06-18
Attending: EMERGENCY MEDICINE | Admitting: INTERNAL MEDICINE
Payer: MEDICARE

## 2024-06-13 DIAGNOSIS — L03.90 CELLULITIS, UNSPECIFIED CELLULITIS SITE: ICD-10-CM

## 2024-06-13 DIAGNOSIS — I48.91 ATRIAL FIBRILLATION WITH RAPID VENTRICULAR RESPONSE (HCC): ICD-10-CM

## 2024-06-13 DIAGNOSIS — J96.21 ACUTE ON CHRONIC RESPIRATORY FAILURE WITH HYPOXIA AND HYPERCAPNIA (HCC): Primary | ICD-10-CM

## 2024-06-13 DIAGNOSIS — J96.22 ACUTE ON CHRONIC RESPIRATORY FAILURE WITH HYPOXIA AND HYPERCAPNIA (HCC): Primary | ICD-10-CM

## 2024-06-13 LAB
ERYTHROCYTE [DISTWIDTH] IN BLOOD BY AUTOMATED COUNT: 13.4 % (ref 11.5–15)
HCT VFR BLD AUTO: 37.9 % (ref 34–48)
HGB BLD-MCNC: 11.9 G/DL (ref 11.5–15.5)
MCH RBC QN AUTO: 29.7 PG (ref 26–35)
MCHC RBC AUTO-ENTMCNC: 31.4 G/DL (ref 32–34.5)
MCV RBC AUTO: 94.5 FL (ref 80–99.9)
PLATELET # BLD AUTO: 152 K/UL (ref 130–450)
PMV BLD AUTO: 10.4 FL (ref 7–12)
RBC # BLD AUTO: 4.01 M/UL (ref 3.5–5.5)
WBC OTHER # BLD: 4.5 K/UL (ref 4.5–11.5)

## 2024-06-13 PROCEDURE — 80048 BASIC METABOLIC PNL TOTAL CA: CPT

## 2024-06-13 PROCEDURE — 85027 COMPLETE CBC AUTOMATED: CPT

## 2024-06-13 PROCEDURE — 6360000002 HC RX W HCPCS: Performed by: EMERGENCY MEDICINE

## 2024-06-13 PROCEDURE — 83880 ASSAY OF NATRIURETIC PEPTIDE: CPT

## 2024-06-13 PROCEDURE — 99285 EMERGENCY DEPT VISIT HI MDM: CPT

## 2024-06-13 PROCEDURE — 87502 INFLUENZA DNA AMP PROBE: CPT

## 2024-06-13 PROCEDURE — 87635 SARS-COV-2 COVID-19 AMP PRB: CPT

## 2024-06-13 PROCEDURE — 96374 THER/PROPH/DIAG INJ IV PUSH: CPT

## 2024-06-13 PROCEDURE — 93005 ELECTROCARDIOGRAM TRACING: CPT | Performed by: EMERGENCY MEDICINE

## 2024-06-13 PROCEDURE — 84484 ASSAY OF TROPONIN QUANT: CPT

## 2024-06-13 PROCEDURE — 96375 TX/PRO/DX INJ NEW DRUG ADDON: CPT

## 2024-06-13 PROCEDURE — 2580000003 HC RX 258: Performed by: EMERGENCY MEDICINE

## 2024-06-13 PROCEDURE — 2500000003 HC RX 250 WO HCPCS: Performed by: EMERGENCY MEDICINE

## 2024-06-13 PROCEDURE — 71045 X-RAY EXAM CHEST 1 VIEW: CPT

## 2024-06-13 RX ORDER — IPRATROPIUM BROMIDE AND ALBUTEROL SULFATE 2.5; .5 MG/3ML; MG/3ML
1 SOLUTION RESPIRATORY (INHALATION) CONTINUOUS
Status: DISCONTINUED | OUTPATIENT
Start: 2024-06-13 | End: 2024-06-13

## 2024-06-13 RX ORDER — METOPROLOL TARTRATE 1 MG/ML
5 INJECTION, SOLUTION INTRAVENOUS ONCE
Status: COMPLETED | OUTPATIENT
Start: 2024-06-13 | End: 2024-06-13

## 2024-06-13 RX ADMIN — METOROPROLOL TARTRATE 5 MG: 5 INJECTION, SOLUTION INTRAVENOUS at 23:37

## 2024-06-13 RX ADMIN — WATER 125 MG: 1 INJECTION INTRAMUSCULAR; INTRAVENOUS; SUBCUTANEOUS at 23:41

## 2024-06-14 ENCOUNTER — APPOINTMENT (OUTPATIENT)
Age: 89
DRG: 193 | End: 2024-06-14
Payer: MEDICARE

## 2024-06-14 PROBLEM — J96.01 ACUTE RESPIRATORY FAILURE WITH HYPOXIA AND HYPERCAPNIA (HCC): Status: ACTIVE | Noted: 2024-06-14

## 2024-06-14 PROBLEM — J96.02 ACUTE RESPIRATORY FAILURE WITH HYPOXIA AND HYPERCAPNIA (HCC): Status: ACTIVE | Noted: 2024-06-14

## 2024-06-14 LAB
ANION GAP SERPL CALCULATED.3IONS-SCNC: 8 MMOL/L (ref 7–16)
B.E.: 5.1 MMOL/L (ref -3–3)
BNP SERPL-MCNC: 3185 PG/ML (ref 0–450)
BUN SERPL-MCNC: 10 MG/DL (ref 6–23)
CALCIUM SERPL-MCNC: 9.1 MG/DL (ref 8.6–10.2)
CHLORIDE SERPL-SCNC: 93 MMOL/L (ref 98–107)
CO2 SERPL-SCNC: 33 MMOL/L (ref 22–29)
COHB: 0.6 % (ref 0–1.5)
CREAT SERPL-MCNC: 0.6 MG/DL (ref 0.5–1)
CRITICAL: ABNORMAL
DATE ANALYZED: ABNORMAL
DATE OF COLLECTION: ABNORMAL
EKG ATRIAL RATE: 124 BPM
EKG Q-T INTERVAL: 278 MS
EKG QRS DURATION: 56 MS
EKG QTC CALCULATION (BAZETT): 399 MS
EKG R AXIS: -37 DEGREES
EKG T AXIS: 49 DEGREES
EKG VENTRICULAR RATE: 124 BPM
GFR, ESTIMATED: 86 ML/MIN/1.73M2
GLUCOSE SERPL-MCNC: 105 MG/DL (ref 74–99)
HCO3: 31.8 MMOL/L (ref 22–26)
HHB: 0.7 % (ref 0–5)
INFLUENZA A BY PCR: NOT DETECTED
INFLUENZA B BY PCR: NOT DETECTED
LAB: ABNORMAL
Lab: 110
METHB: 0.1 % (ref 0–1.5)
MICROORGANISM SPEC CULT: NORMAL
MICROORGANISM/AGENT SPEC: NORMAL
MODE: ABNORMAL
O2 CONTENT: 17 ML/DL
O2 SATURATION: 99.3 % (ref 92–98.5)
O2HB: 98.6 % (ref 94–97)
OPERATOR ID: ABNORMAL
PATIENT TEMP: 37 C
PCO2: 57 MMHG (ref 35–45)
PH BLOOD GAS: 7.37 (ref 7.35–7.45)
PO2: 135.6 MMHG (ref 75–100)
POTASSIUM SERPL-SCNC: 4.3 MMOL/L (ref 3.5–5)
SARS-COV-2 RDRP RESP QL NAA+PROBE: NOT DETECTED
SERVICE CMNT-IMP: NORMAL
SODIUM SERPL-SCNC: 134 MMOL/L (ref 132–146)
SOURCE, BLOOD GAS: ABNORMAL
SPECIMEN DESCRIPTION: NORMAL
SPECIMEN DESCRIPTION: NORMAL
THB: 12.1 G/DL (ref 11.5–16.5)
TIME ANALYZED: 123
TROPONIN I SERPL HS-MCNC: 18 NG/L (ref 0–9)
TROPONIN I SERPL HS-MCNC: 20 NG/L (ref 0–9)
TROPONIN I SERPL HS-MCNC: 20 NG/L (ref 0–9)

## 2024-06-14 PROCEDURE — 97530 THERAPEUTIC ACTIVITIES: CPT | Performed by: PHYSICAL THERAPIST

## 2024-06-14 PROCEDURE — 97161 PT EVAL LOW COMPLEX 20 MIN: CPT | Performed by: PHYSICAL THERAPIST

## 2024-06-14 PROCEDURE — G0378 HOSPITAL OBSERVATION PER HR: HCPCS

## 2024-06-14 PROCEDURE — 36415 COLL VENOUS BLD VENIPUNCTURE: CPT

## 2024-06-14 PROCEDURE — 2700000000 HC OXYGEN THERAPY PER DAY

## 2024-06-14 PROCEDURE — 94664 DEMO&/EVAL PT USE INHALER: CPT

## 2024-06-14 PROCEDURE — 93306 TTE W/DOPPLER COMPLETE: CPT

## 2024-06-14 PROCEDURE — 6360000002 HC RX W HCPCS

## 2024-06-14 PROCEDURE — 96375 TX/PRO/DX INJ NEW DRUG ADDON: CPT

## 2024-06-14 PROCEDURE — 84484 ASSAY OF TROPONIN QUANT: CPT

## 2024-06-14 PROCEDURE — 6370000000 HC RX 637 (ALT 250 FOR IP)

## 2024-06-14 PROCEDURE — 6370000000 HC RX 637 (ALT 250 FOR IP): Performed by: EMERGENCY MEDICINE

## 2024-06-14 PROCEDURE — 94640 AIRWAY INHALATION TREATMENT: CPT

## 2024-06-14 PROCEDURE — 82805 BLOOD GASES W/O2 SATURATION: CPT

## 2024-06-14 PROCEDURE — 93010 ELECTROCARDIOGRAM REPORT: CPT | Performed by: INTERNAL MEDICINE

## 2024-06-14 PROCEDURE — 6360000002 HC RX W HCPCS: Performed by: EMERGENCY MEDICINE

## 2024-06-14 RX ORDER — VITAMIN B COMPLEX
2000 TABLET ORAL DAILY
Status: DISCONTINUED | OUTPATIENT
Start: 2024-06-14 | End: 2024-06-18 | Stop reason: HOSPADM

## 2024-06-14 RX ORDER — METOPROLOL SUCCINATE 50 MG/1
50 TABLET, EXTENDED RELEASE ORAL 2 TIMES DAILY
Status: DISCONTINUED | OUTPATIENT
Start: 2024-06-14 | End: 2024-06-18 | Stop reason: HOSPADM

## 2024-06-14 RX ORDER — DIGOXIN 0.25 MG/ML
250 INJECTION INTRAMUSCULAR; INTRAVENOUS ONCE
Status: COMPLETED | OUTPATIENT
Start: 2024-06-14 | End: 2024-06-14

## 2024-06-14 RX ORDER — HYDROCODONE BITARTRATE AND ACETAMINOPHEN 5; 325 MG/1; MG/1
1 TABLET ORAL ONCE
Status: COMPLETED | OUTPATIENT
Start: 2024-06-14 | End: 2024-06-14

## 2024-06-14 RX ORDER — DOCUSATE SODIUM 100 MG/1
100 CAPSULE, LIQUID FILLED ORAL NIGHTLY
Status: DISCONTINUED | OUTPATIENT
Start: 2024-06-14 | End: 2024-06-18 | Stop reason: HOSPADM

## 2024-06-14 RX ORDER — ARFORMOTEROL TARTRATE 15 UG/2ML
15 SOLUTION RESPIRATORY (INHALATION)
Status: DISCONTINUED | OUTPATIENT
Start: 2024-06-14 | End: 2024-06-18 | Stop reason: HOSPADM

## 2024-06-14 RX ORDER — BUDESONIDE 0.5 MG/2ML
0.5 INHALANT ORAL
Status: DISCONTINUED | OUTPATIENT
Start: 2024-06-14 | End: 2024-06-18 | Stop reason: HOSPADM

## 2024-06-14 RX ORDER — LINEZOLID 600 MG/1
600 TABLET, FILM COATED ORAL EVERY 12 HOURS SCHEDULED
Status: DISCONTINUED | OUTPATIENT
Start: 2024-06-14 | End: 2024-06-17

## 2024-06-14 RX ORDER — FUROSEMIDE 40 MG/1
40 TABLET ORAL DAILY
Status: DISCONTINUED | OUTPATIENT
Start: 2024-06-15 | End: 2024-06-18 | Stop reason: HOSPADM

## 2024-06-14 RX ORDER — HYDROCODONE BITARTRATE AND ACETAMINOPHEN 5; 325 MG/1; MG/1
1 TABLET ORAL EVERY 4 HOURS PRN
Status: DISCONTINUED | OUTPATIENT
Start: 2024-06-14 | End: 2024-06-18 | Stop reason: HOSPADM

## 2024-06-14 RX ORDER — FUROSEMIDE 20 MG/1
20 TABLET ORAL DAILY
Status: DISCONTINUED | OUTPATIENT
Start: 2024-06-14 | End: 2024-06-14

## 2024-06-14 RX ORDER — METOPROLOL TARTRATE 50 MG/1
50 TABLET, FILM COATED ORAL 2 TIMES DAILY PRN
Status: DISCONTINUED | OUTPATIENT
Start: 2024-06-14 | End: 2024-06-14

## 2024-06-14 RX ORDER — GABAPENTIN 100 MG/1
100 CAPSULE ORAL 3 TIMES DAILY
Status: DISCONTINUED | OUTPATIENT
Start: 2024-06-14 | End: 2024-06-18 | Stop reason: HOSPADM

## 2024-06-14 RX ORDER — POTASSIUM CHLORIDE 20 MEQ/1
20 TABLET, EXTENDED RELEASE ORAL DAILY
Status: DISCONTINUED | OUTPATIENT
Start: 2024-06-14 | End: 2024-06-18 | Stop reason: HOSPADM

## 2024-06-14 RX ORDER — ATORVASTATIN CALCIUM 10 MG/1
10 TABLET, FILM COATED ORAL DAILY
Status: DISCONTINUED | OUTPATIENT
Start: 2024-06-14 | End: 2024-06-18 | Stop reason: HOSPADM

## 2024-06-14 RX ORDER — FUROSEMIDE 10 MG/ML
40 INJECTION INTRAMUSCULAR; INTRAVENOUS ONCE
Status: COMPLETED | OUTPATIENT
Start: 2024-06-14 | End: 2024-06-14

## 2024-06-14 RX ORDER — PANTOPRAZOLE SODIUM 40 MG/1
40 TABLET, DELAYED RELEASE ORAL
Status: DISCONTINUED | OUTPATIENT
Start: 2024-06-14 | End: 2024-06-18 | Stop reason: HOSPADM

## 2024-06-14 RX ADMIN — BUDESONIDE INHALATION 500 MCG: 0.5 SUSPENSION RESPIRATORY (INHALATION) at 18:48

## 2024-06-14 RX ADMIN — DIGOXIN 250 MCG: 0.25 INJECTION INTRAMUSCULAR; INTRAVENOUS at 10:39

## 2024-06-14 RX ADMIN — METOPROLOL SUCCINATE 50 MG: 50 TABLET, EXTENDED RELEASE ORAL at 09:08

## 2024-06-14 RX ADMIN — DOCUSATE SODIUM 100 MG: 100 CAPSULE, LIQUID FILLED ORAL at 20:33

## 2024-06-14 RX ADMIN — LINEZOLID 600 MG: 600 TABLET, FILM COATED ORAL at 20:33

## 2024-06-14 RX ADMIN — PANTOPRAZOLE SODIUM 40 MG: 40 TABLET, DELAYED RELEASE ORAL at 09:01

## 2024-06-14 RX ADMIN — GABAPENTIN 100 MG: 100 CAPSULE ORAL at 20:33

## 2024-06-14 RX ADMIN — POTASSIUM CHLORIDE 20 MEQ: 1500 TABLET, EXTENDED RELEASE ORAL at 09:01

## 2024-06-14 RX ADMIN — Medication 2000 UNITS: at 15:48

## 2024-06-14 RX ADMIN — APIXABAN 2.5 MG: 2.5 TABLET, FILM COATED ORAL at 20:33

## 2024-06-14 RX ADMIN — FUROSEMIDE 40 MG: 10 INJECTION, SOLUTION INTRAMUSCULAR; INTRAVENOUS at 06:24

## 2024-06-14 RX ADMIN — ARFORMOTEROL TARTRATE 15 MCG: 15 SOLUTION RESPIRATORY (INHALATION) at 18:48

## 2024-06-14 RX ADMIN — GABAPENTIN 100 MG: 100 CAPSULE ORAL at 09:00

## 2024-06-14 RX ADMIN — HYDROCODONE BITARTRATE AND ACETAMINOPHEN 1 TABLET: 5; 325 TABLET ORAL at 20:33

## 2024-06-14 RX ADMIN — ARFORMOTEROL TARTRATE 15 MCG: 15 SOLUTION RESPIRATORY (INHALATION) at 10:16

## 2024-06-14 RX ADMIN — HYDROCODONE BITARTRATE AND ACETAMINOPHEN 1 TABLET: 5; 325 TABLET ORAL at 06:24

## 2024-06-14 RX ADMIN — ATORVASTATIN CALCIUM 10 MG: 10 TABLET, FILM COATED ORAL at 09:01

## 2024-06-14 RX ADMIN — APIXABAN 2.5 MG: 2.5 TABLET, FILM COATED ORAL at 09:00

## 2024-06-14 RX ADMIN — GABAPENTIN 100 MG: 100 CAPSULE ORAL at 15:48

## 2024-06-14 RX ADMIN — BUDESONIDE INHALATION 500 MCG: 0.5 SUSPENSION RESPIRATORY (INHALATION) at 10:16

## 2024-06-14 RX ADMIN — LINEZOLID 600 MG: 600 TABLET, FILM COATED ORAL at 08:58

## 2024-06-14 RX ADMIN — METOPROLOL SUCCINATE 50 MG: 50 TABLET, EXTENDED RELEASE ORAL at 20:33

## 2024-06-14 ASSESSMENT — PAIN SCALES - GENERAL
PAINLEVEL_OUTOF10: 0
PAINLEVEL_OUTOF10: 0
PAINLEVEL_OUTOF10: 6

## 2024-06-14 ASSESSMENT — PAIN DESCRIPTION - ORIENTATION: ORIENTATION: RIGHT;LEFT

## 2024-06-14 ASSESSMENT — PAIN DESCRIPTION - DESCRIPTORS: DESCRIPTORS: ACHING;PRESSURE;TENDER

## 2024-06-14 ASSESSMENT — PAIN DESCRIPTION - LOCATION: LOCATION: LEG

## 2024-06-14 ASSESSMENT — PAIN DESCRIPTION - PAIN TYPE: TYPE: ACUTE PAIN

## 2024-06-14 ASSESSMENT — PAIN - FUNCTIONAL ASSESSMENT: PAIN_FUNCTIONAL_ASSESSMENT: PREVENTS OR INTERFERES SOME ACTIVE ACTIVITIES AND ADLS

## 2024-06-14 NOTE — CARE COORDINATION
Case Management Assessment  Initial Evaluation    Date/Time of Evaluation: 6/14/2024 2:18 PM  Assessment Completed by: Mihaela Santos RN    If patient is discharged prior to next notation, then this note serves as note for discharge by case management.    Patient Name: Shauna Gaines                   YOB: 1935  Diagnosis: Atrial fibrillation with rapid ventricular response (HCC) [I48.91]  Acute respiratory failure with hypoxia and hypercapnia (HCC) [J96.01, J96.02]  Acute on chronic respiratory failure with hypoxia and hypercapnia (HCC) [J96.21, J96.22]                   Date / Time: 6/13/2024 10:50 PM    Patient Admission Status: Observation   Readmission Risk (Low < 19, Mod (19-27), High > 27): Readmission Risk Score: 13.1    Current PCP: Ross Mercer MD  PCP verified by CM? Yes    Chart Reviewed: Yes      History Provided by: Patient, Child/Family  Patient Orientation: Alert and Oriented, Person, Place, Situation    Patient Cognition: Alert    Hospitalization in the last 30 days (Readmission):  Yes      Readmission Assessment  Number of Days since last admission?: 1-7 days  Previous Disposition: SNF  Who is being Interviewed: Patient  What was the patient's/caregiver's perception as to why they think they needed to return back to the hospital?: Other (Comment) (short of breath)  Did you visit your Primary Care Physician after you left the hospital, before you returned this time?: Yes  Did you see a specialist, such as Cardiac, Pulmonary, Orthopedic Physician, etc. after you left the hospital?: No  Who advised the patient to return to the hospital?: Skilled Unit  Does the patient report anything that got in the way of taking their medications?: Yes (pt/family states she did not get medications on time)  In our efforts to provide the best possible care to you and others like you, can you think of anything that we could have done to help you after you left the hospital the first time, so that you  might not have needed to return so soon?: Other (Comment) (no, plans to return home with [facility])     Advance Directives:      Code Status: Prior   Patient's Primary Decision Maker is: Legal Next of Kin    Primary Decision Maker: Luzmaria Keller - Child - [phone]    Primary Decision Maker: Irena Edouard - Child - [phone]    Discharge Planning:    Patient lives with: Children Type of Home: Skilled Nursing Facility  Primary Care Giver: Self  Patient Support Systems include: Children, Family Members   Current Financial resources:    Current community resources:    Current services prior to admission: Wound Vac            Current DME:              Type of Home Care services:  Nursing Services, PT, OT    ADLS  Prior functional level: Assistance with the following:, Mobility, Bathing, Cooking, Housework, Shopping  Current functional level: Assistance with the following:, Bathing, Cooking, Housework, Shopping, Mobility    PT AM-PAC: 15 /24  OT AM-PAC:   /24    Family can provide assistance at DC: Yes  Would you like Case Management to discuss the discharge plan with any other family members/significant others, and if so, who? Yes (daughters x4 as needed)  Plans to Return to Present Housing: Other (see comment) (see CM note)    Potential Assistance needed at discharge: N/A            Potential DME:    Patient expects to discharge to: House  Plan for transportation at discharge:      Financial    Payor: [employer] MEDICARE / Plan: [employer] MEDIBLUE ESSENTIAL/PLUS / Product Type: *No Product type* /       Potential assistance Purchasing Medications:    Meds-to-Beds request: No      [id number] - [place], [place] - [address] [facility] [phone] - F [phone]  [address] [place] [address]  Phone: [phone] Fax: [phone]      Notes:        Additional Case Management Notes: 6/14/2024 1420 CM note: Met with patient and her dtr Irena for transition of care needs. Pt is pleasant and presented from [facility](skilled care x2

## 2024-06-14 NOTE — CARE COORDINATION
6/14/2024 1558 CM note: Pt is presents from [facility](skilled care x2 days). She declines SNF and plans to return home with her dtr Luzmaria, 2 story home with 3 BRY. Pt/dtr Irena relay that pt will be able to be on 1st level of home and have almost 24/7 care between her dtrs as Irena works remote. Pt agreeable to [facility]. [facility] accepted pt-will need orders. DME includes cane, ww, shower chair, nebulizer and home o2@2L(portable/concentrator) from [facility].  She had RLE I/D and wound vac applied on 6/5/24, NWB RLE. Await podiatry plan for wound vac. IF pt will need wound vac at d/c-will need orders/wound measurements/Rx.  Lazaro from [facility] informed and will follow. CM/SW to follow for possible abx needs/wound vac needs/ [facility]HC orders for PT/OT/SN/wound care and DME -may need bsc and w/c (pt prefers [facility]). Elio OSCAR

## 2024-06-14 NOTE — ED PROVIDER NOTES
Patient is an 89 y/o female who presents to the ED via EMS from the nursing home with shortness of breath. Patient states she had onset of shortness of breath this morning. She denies any chest pain. She denies any recent fever or cough. She normally wears 2 liters of oxygen, however, she was placed on 15 liters en route. She is hypoxic on 15 liters at 86%.         Review of Systems   Constitutional:  Negative for chills and fever.   HENT:  Negative for ear pain, sinus pressure and sore throat.    Eyes:  Negative for pain, discharge and redness.   Respiratory:  Positive for shortness of breath. Negative for cough and wheezing.    Cardiovascular:  Negative for chest pain.   Gastrointestinal:  Negative for abdominal distention, diarrhea, nausea and vomiting.   Genitourinary:  Negative for dysuria and frequency.   Musculoskeletal:  Negative for arthralgias and back pain.   Skin:  Negative for rash and wound.   Neurological:  Negative for weakness and headaches.   Hematological:  Negative for adenopathy.   All other systems reviewed and are negative.       Physical Exam  Vitals and nursing note reviewed.   Constitutional:       General: She is not in acute distress.  HENT:      Head: Normocephalic and atraumatic.      Mouth/Throat:      Mouth: Mucous membranes are moist.   Eyes:      Pupils: Pupils are equal, round, and reactive to light.   Cardiovascular:      Rate and Rhythm: Regular rhythm. Tachycardia present.      Heart sounds: No murmur heard.  Pulmonary:      Effort: Tachypnea present. No respiratory distress.      Breath sounds: No stridor. Examination of the left-upper field reveals decreased breath sounds. Examination of the right-middle field reveals decreased breath sounds. Examination of the left-middle field reveals decreased breath sounds. Examination of the right-lower field reveals decreased breath sounds. Examination of the left-lower field reveals decreased breath sounds. Decreased breath sounds  below have been reviewed.     Patient Vitals for the past 24 hrs:   BP Temp Pulse Resp SpO2   06/14/24 0400 (!) 161/71 -- 97 14 99 %   06/14/24 0330 (!) 169/89 -- 98 15 100 %   06/14/24 0230 (!) 167/76 -- 99 14 99 %   06/14/24 0200 (!) 159/77 -- 92 15 100 %   06/14/24 0130 (!) 159/74 -- 90 19 100 %   06/14/24 0100 (!) 177/83 -- 99 17 100 %   06/14/24 0030 (!) 159/85 -- 81 18 100 %   06/14/24 0000 135/67 -- 78 17 100 %   06/13/24 2309 -- -- (!) 124 20 100 %   06/13/24 2300 (!) 207/159 -- (!) 130 23 (!) 89 %   06/13/24 2252 (!) 190/123 98.8 °F (37.1 °C) (!) 103 19 97 %       Oxygen Saturation Interpretation: Abnormal    ------------------------------------------ PROGRESS NOTES ------------------------------------------  Re-evaluation(s):  Time: 0445.  Patient’s symptoms show no change  Repeat physical examination is improved    Counseling:  I have spoken with the patient and discussed today’s results, in addition to providing specific details for the plan of care and counseling regarding the diagnosis and prognosis.  Their questions are answered at this time and they are agreeable with the plan of admission.    --------------------------------- ADDITIONAL PROVIDER NOTES ---------------------------------  Consultations:  Time: 0500. Spoke with Dr. Noguera.  Discussed case.  They will admit the patient.  This patient's ED course included: a personal history and physicial examination, re-evaluation prior to disposition, multiple bedside re-evaluations, IV medications, cardiac monitoring, and continuous pulse oximetry    This patient has remained hemodynamically stable during their ED course.    Diagnosis:  1. Acute on chronic respiratory failure with hypoxia and hypercapnia (HCC)    2. Atrial fibrillation with rapid ventricular response (HCC)        Disposition:  Patient's disposition: Admit to telemetry  Patient's condition is stable.         Tc Reyes DO  06/14/24 0516

## 2024-06-14 NOTE — PROGRESS NOTES
Physical Therapy    Physical Therapy Initial Evaluation/Plan of Care    Room #:  0439/0439-01  Patient Name: Shauna Gaines  YOB: 1935  MRN: [id number]    Date of Service: 6/14/2024     Tentative placement recommendation: Subacute Rehab vs home p.t. and 24/7 d/t wound vac and o2 line and non weight bearing status  Equipment recommendation: Wheelchair  vs transport chair pending wt bearing status    Evaluating Physical Therapist: Toy Martinez, PT  #[id number]      Specific Provider Orders/Date/Referring Provider :  PT eval and treat  Start:  06/14/24 0845,   End:  06/14/24 0845,   ONE TIME,   Standing Count:  1 Occurrences,   R       Austen Noguera DO    Admitting Diagnosis:   Atrial fibrillation with rapid ventricular response (HCC) [I48.91]  Acute respiratory failure with hypoxia and hypercapnia (HCC) [J96.01, J96.02]  Acute on chronic respiratory failure with hypoxia and hypercapnia (HCC) [J96.21, J96.22]     Admitted with    shortness of breath   Surgery: none  Visit Diagnoses         Codes    Acute on chronic respiratory failure with hypoxia and hypercapnia (HCC)    -  Primary J96.21, J96.22    Atrial fibrillation with rapid ventricular response (HCC)     I48.91            Patient Active Problem List   Diagnosis    Choledocholithiasis    Dilated cbd, acquired    Cellulitis of right leg    Cellulitis    Wound of right leg    Severe protein-calorie malnutrition (HCC)    Acute respiratory failure with hypoxia and hypercapnia (HCC)        ASSESSMENT of Current Deficits Patient exhibits decreased strength, balance, and endurance impairing functional mobility, transfers, gait , gait distance, and tolerance to activity are barriers to d/c and require skilled intervention to address concerns listed above to increase safety and independence at discharge.   Decreased strength, balance and endurance  increases patient's risk for fall.   Pt able to maintain non weight bearing during transfers however states

## 2024-06-14 NOTE — CONSULTS
[address] [place] [address] [address] [place] [address]  Phone [phone]   [phone]      Admit Date: 2024 10:50 PM  Pt Name: Shauna Gaines  MRN: [id number]  : 1935  Reason for Consult:    Chief Complaint   Patient presents with    Shortness of Breath     Patient comes from [facility]. Reports being SOB since she woke up this morning but has since resolved.      Requesting Physician:  Austen Noguera DO  PCP: Ross Mercer MD  History Obtained From:  patient, chart   ID consulted for Atrial fibrillation with rapid ventricular response (HCC) [I48.91]  Acute respiratory failure with hypoxia and hypercapnia (HCC) [J96.01, J96.02]  Acute on chronic respiratory failure with hypoxia and hypercapnia (HCC) [J96.21, J96.22]  on hospital day 0  Face to face encounter   CHIEF COMPLAINT       Chief Complaint   Patient presents with    Shortness of Breath     Patient comes from [facility]. Reports being SOB since she woke up this morning but has since resolved.      HISTORYOF PRESENT ILLNESS   Shauna Gaines is a 89 y.o. female who  has a past medical history of Aortic valve stenosis, Asthma, COPD (chronic obstructive pulmonary disease) (HCC), COVID, Hearing aid worn, Heart murmur, Hyperlipidemia, and Hypertension.   Presented to ED TRIAGE VITALS  BP: 131/62, Temp: 98.1 °F (36.7 °C), Pulse: (!) 122, Respirations: 18, SpO2: 98 %  HPI:  ID was consulted on 24 for infection management  Pt presented to ER on 2024 with diagnosis of  Atrial fibrillation with rapid ventricular response (HCC) [I48.91]  Acute respiratory failure with hypoxia and hypercapnia (HCC) [J96.01, J96.02]  Acute on chronic respiratory failure with hypoxia and hypercapnia (HCC) [J96.21, J96.22]     Patient is known to ID service- she was recently in the hospital for right leg hematoma s/p I&D.  She was discharged on Zyvox PO.  She was discharged to a nursing facility- she developed shortness of breath

## 2024-06-14 NOTE — PROGRESS NOTES
4 Eyes Skin Assessment     NAME:  Shauna Gaines  YOB: 1935  MEDICAL RECORD NUMBER:  [id number]    The patient is being assessed for  Admission    I agree that at least one RN has performed a thorough Head to Toe Skin Assessment on the patient. ALL assessment sites listed below have been assessed.      Areas assessed by both nurses:    Head, Face, Ears, Shoulders, Back, Chest, Arms, Elbows, Hands, Sacrum. Buttock, Coccyx, Ischium, and Legs. Feet and Heels        Does the Patient have a Wound? Yes, Stage I to left buttocks, wound RLE with wound vac present       Johnnie Prevention initiated by RN: Yes  Wound Care Orders initiated by RN: Yes    Pressure Injury (Stage 3,4, Unstageable, DTI, NWPT, and Complex wounds) if present, place Wound referral order by RN under : No    New Ostomies, if present place, Ostomy referral order under : No     Nurse 1 eSignature: Electronically signed by Miguelito Velazquez RN on 6/14/24 at 7:28 AM EDT    **SHARE this note so that the co-signing nurse can place an eSignature**    Nurse 2 eSignature: Electronically signed by Viviana Wren RN on 6/14/24 at 7:54 AM EDT

## 2024-06-14 NOTE — ACP (ADVANCE CARE PLANNING)
Advance Care Planning   Healthcare Decision Maker:    Primary Decision Maker: Luzmaria Keller - Child - [phone]    Primary Decision Maker: Irena Edouard - Child - [phone]

## 2024-06-14 NOTE — CONSULTS
Department of Podiatry   Consult Note        Reason for Consult: Right lower extremity wound    CHIEF COMPLAINT: Right lower extremity wound    HISTORY OF PRESENT ILLNESS:                The patient is a 89 y.o. female with significant past medical history of right lower extremity wound status post a dog rubbing her, shortness of breath, COPD, hypertension presents to podiatry service for evaluation of wound to anterior right shin.  She recently had an incision and drainage with podiatry team on 06/05/2024 after which a wound VAC was applied.  Wound today was evaluated and appears stable and to be healing well.  She understands plan for application of wound VAC again moving forward.. Patient denies any N/V/D/F/C/SOB/CP and has no other pedal complaints at this time.     Past Medical History:        Diagnosis Date    Aortic valve stenosis     Asthma     COPD (chronic obstructive pulmonary disease) ([facility])     COVID 12/2022    Hearing aid worn     Heart murmur     Hyperlipidemia     Hypertension        Past Surgical History:        Procedure Laterality Date    APPENDECTOMY      BACK SURGERY      x3    CHOLECYSTECTOMY      HERNIA REPAIR  2021    HIP ARTHROPLASTY Bilateral     LEG SURGERY Right 6/5/2024    RIGHT LEG INCISION AND DRAINAGE POSSIBLE WOUND VAC APPLICATION performed by Jared Dexter DPM at [facility] OR    PACEMAKER INSERTION  03/18/2024    TONSILLECTOMY         Medications Prior to Admission:    Medications Prior to Admission: HYDROcodone-acetaminophen (NORCO) 5-325 MG per tablet, Take 1 tablet by mouth every 4 hours as needed for Pain for up to 3 days. Max Daily Amount: 6 tablets  lidocaine 4 % external patch, Place 1 patch onto the skin daily  doxycycline hyclate (VIBRA-TABS) 100 MG tablet, Take 1 tablet by mouth 2 times daily for 10 days  gabapentin (NEURONTIN) 100 MG capsule, Take 1 capsule by mouth 3 times daily for 7 days.  furosemide (LASIX) 20 MG tablet, Take 1 tablet by mouth daily  linezolid (ZYVOX) 600  sensation is intact but decreased.     Dermatologic Exam: Skin is intact and well-hydrated.  There is a full-thickness wound with a granular base noted to the anterior lateral leg.  There is some sanguinous discharge associated.  No bogginess fluctuance crepitance surrounding the room.  No pain on palpation.  No surrounding erythema or edema.  No associated malodor or purulence noted.  No other acute signs of infection.     MSK: Muscle strength 5/5 Right  ROM is full without pain or crepitation bilateral.  Pain on palpation Right leg.         CONSULTS:  IP CONSULT TO INFECTIOUS DISEASES  IP CONSULT TO PODIATRY    MEDICATION:  Scheduled Meds:   Vitamin D  2,000 Units Oral Daily    docusate sodium  100 mg Oral Nightly    apixaban  2.5 mg Oral BID    gabapentin  100 mg Oral TID    atorvastatin  10 mg Oral Daily    budesonide  0.5 mg Nebulization BID RT    arformoterol tartrate  15 mcg Nebulization BID RT    linezolid  600 mg Oral 2 times per day    pantoprazole  40 mg Oral QAM AC    metoprolol succinate  50 mg Oral BID    potassium chloride  20 mEq Oral Daily    [START ON 6/15/2024] furosemide  40 mg Oral Daily     Continuous Infusions:  PRN Meds:.HYDROcodone-acetaminophen, perflutren lipid microspheres    RADIOLOGY:  XR CHEST PORTABLE   Final Result   Small bilateral pleural effusions.             Vitals:    /62   Pulse (!) 122   Temp 98.1 °F (36.7 °C) (Oral)   Resp 18   Ht 1.524 m (5')   SpO2 98%   BMI 18.02 kg/m²     LABS:   Recent Labs     06/13/24  2326   WBC 4.5   HGB 11.9   HCT 37.9        Recent Labs     06/13/24  2326      K 4.3   CL 93*   CO2 33*   BUN 10   CREATININE 0.6     No results for input(s): \"PROT\", \"INR\", \"APTT\" in the last 72 hours.    ASSESSMENTS:   ASSESSMENT:  - Right lower extremity wound-stable, healing  - Cellulitis  - Infection in right lower extremity secondary to dog contact  - COPD  - Heart murmur, history of aortic valve stenosis  - Hypertension  - former

## 2024-06-15 LAB
25(OH)D3 SERPL-MCNC: 29.8 NG/ML (ref 30–100)
ALBUMIN SERPL-MCNC: 2.9 G/DL (ref 3.5–5.2)
ALP SERPL-CCNC: 83 U/L (ref 35–104)
ALT SERPL-CCNC: 8 U/L (ref 0–32)
ANION GAP SERPL CALCULATED.3IONS-SCNC: 6 MMOL/L (ref 7–16)
AST SERPL-CCNC: 24 U/L (ref 0–31)
BASOPHILS # BLD: 0.02 K/UL (ref 0–0.2)
BASOPHILS NFR BLD: 1 % (ref 0–2)
BILIRUB SERPL-MCNC: 0.3 MG/DL (ref 0–1.2)
BUN SERPL-MCNC: 12 MG/DL (ref 6–23)
CALCIUM SERPL-MCNC: 8.5 MG/DL (ref 8.6–10.2)
CHLORIDE SERPL-SCNC: 92 MMOL/L (ref 98–107)
CHOLEST SERPL-MCNC: 100 MG/DL
CO2 SERPL-SCNC: 35 MMOL/L (ref 22–29)
CREAT SERPL-MCNC: 0.6 MG/DL (ref 0.5–1)
ECHO AV AREA PEAK VELOCITY: 1.6 CM2
ECHO AV AREA VTI: 1.6 CM2
ECHO AV MEAN GRADIENT: 9 MMHG
ECHO AV MEAN VELOCITY: 1.5 M/S
ECHO AV PEAK GRADIENT: 17 MMHG
ECHO AV PEAK VELOCITY: 2.1 M/S
ECHO AV VELOCITY RATIO: 0.48
ECHO AV VTI: 41.3 CM
ECHO EST RA PRESSURE: 3 MMHG
ECHO LA DIAMETER: 3.7 CM
ECHO LA VOL A-L A2C: 19 ML (ref 22–52)
ECHO LA VOL A-L A4C: 25 ML (ref 22–52)
ECHO LA VOL BP: 24 ML (ref 22–52)
ECHO LA VOL MOD A2C: 18 ML (ref 22–52)
ECHO LA VOL MOD A4C: 23 ML (ref 22–52)
ECHO LA VOLUME AREA LENGTH: 26 ML
ECHO LV EDV A2C: 33 ML
ECHO LV EDV A4C: 46 ML
ECHO LV EDV BP: 42 ML (ref 56–104)
ECHO LV EJECTION FRACTION A2C: 71 %
ECHO LV EJECTION FRACTION A4C: 61 %
ECHO LV EJECTION FRACTION BIPLANE: 69 % (ref 55–100)
ECHO LV ESV A2C: 10 ML
ECHO LV ESV A4C: 18 ML
ECHO LV ESV BP: 13 ML (ref 19–49)
ECHO LV FRACTIONAL SHORTENING: 29 % (ref 28–44)
ECHO LV INTERNAL DIMENSION DIASTOLIC: 2.8 CM (ref 3.9–5.3)
ECHO LV INTERNAL DIMENSION SYSTOLIC: 2 CM
ECHO LV ISOVOLUMETRIC RELAXATION TIME (IVRT): 118 MS
ECHO LV IVSD: 1.2 CM (ref 0.6–0.9)
ECHO LV MASS 2D: 106.2 G (ref 67–162)
ECHO LV POSTERIOR WALL DIASTOLIC: 1.3 CM (ref 0.6–0.9)
ECHO LV RELATIVE WALL THICKNESS RATIO: 0.93
ECHO LVOT AREA: 3.1 CM2
ECHO LVOT AV VTI INDEX: 0.49
ECHO LVOT DIAM: 2 CM
ECHO LVOT MEAN GRADIENT: 2 MMHG
ECHO LVOT PEAK GRADIENT: 4 MMHG
ECHO LVOT PEAK VELOCITY: 1 M/S
ECHO LVOT SV: 63.4 ML
ECHO LVOT VTI: 20.2 CM
ECHO MV "A" WAVE DURATION: 125.6 MSEC
ECHO MV A VELOCITY: 1.74 M/S
ECHO MV AREA PHT: 2.6 CM2
ECHO MV AREA VTI: 1.4 CM2
ECHO MV E DECELERATION TIME (DT): 275.9 MS
ECHO MV E VELOCITY: 1.45 M/S
ECHO MV E/A RATIO: 0.83
ECHO MV LVOT VTI INDEX: 2.22
ECHO MV MAX VELOCITY: 1.8 M/S
ECHO MV MEAN GRADIENT: 6 MMHG
ECHO MV MEAN VELOCITY: 1.2 M/S
ECHO MV PEAK GRADIENT: 13 MMHG
ECHO MV PRESSURE HALF TIME (PHT): 85.1 MS
ECHO MV VTI: 44.8 CM
ECHO PULMONARY ARTERY END DIASTOLIC PRESSURE: 5 MMHG
ECHO PV MAX VELOCITY: 1 M/S
ECHO PV MEAN GRADIENT: 2 MMHG
ECHO PV MEAN VELOCITY: 0.6 M/S
ECHO PV PEAK GRADIENT: 4 MMHG
ECHO PV REGURGITANT MAX VELOCITY: 1.1 M/S
ECHO PV VTI: 17.5 CM
ECHO RIGHT VENTRICULAR SYSTOLIC PRESSURE (RVSP): 63 MMHG
ECHO RV INTERNAL DIMENSION: 2.8 CM
ECHO RV LONGITUDINAL DIMENSION: 5.6 CM
ECHO RV MID DIMENSION: 1.7 CM
ECHO TV REGURGITANT MAX VELOCITY: 3.88 M/S
ECHO TV REGURGITANT PEAK GRADIENT: 60 MMHG
EOSINOPHIL # BLD: 0.07 K/UL (ref 0.05–0.5)
EOSINOPHILS RELATIVE PERCENT: 2 % (ref 0–6)
ERYTHROCYTE [DISTWIDTH] IN BLOOD BY AUTOMATED COUNT: 13.5 % (ref 11.5–15)
FOLATE SERPL-MCNC: 7 NG/ML (ref 4.8–24.2)
GFR, ESTIMATED: 84 ML/MIN/1.73M2
GLUCOSE SERPL-MCNC: 77 MG/DL (ref 74–99)
HCT VFR BLD AUTO: 34.2 % (ref 34–48)
HDLC SERPL-MCNC: 60 MG/DL
HGB BLD-MCNC: 10.8 G/DL (ref 11.5–15.5)
IMM GRANULOCYTES # BLD AUTO: <0.03 K/UL (ref 0–0.58)
IMM GRANULOCYTES NFR BLD: 0 % (ref 0–5)
IRON SATN MFR SERPL: 54 % (ref 15–50)
IRON SERPL-MCNC: 112 UG/DL (ref 37–145)
LDLC SERPL CALC-MCNC: 31 MG/DL
LYMPHOCYTES NFR BLD: 1.15 K/UL (ref 1.5–4)
LYMPHOCYTES RELATIVE PERCENT: 32 % (ref 20–42)
MCH RBC QN AUTO: 29.5 PG (ref 26–35)
MCHC RBC AUTO-ENTMCNC: 31.6 G/DL (ref 32–34.5)
MCV RBC AUTO: 93.4 FL (ref 80–99.9)
MONOCYTES NFR BLD: 0.45 K/UL (ref 0.1–0.95)
MONOCYTES NFR BLD: 12 % (ref 2–12)
NEUTROPHILS NFR BLD: 54 % (ref 43–80)
NEUTS SEG NFR BLD: 1.95 K/UL (ref 1.8–7.3)
PLATELET # BLD AUTO: 110 K/UL (ref 130–450)
PMV BLD AUTO: 11.1 FL (ref 7–12)
POTASSIUM SERPL-SCNC: 3.9 MMOL/L (ref 3.5–5)
PROT SERPL-MCNC: 5.4 G/DL (ref 6.4–8.3)
RBC # BLD AUTO: 3.66 M/UL (ref 3.5–5.5)
SODIUM SERPL-SCNC: 133 MMOL/L (ref 132–146)
T4 FREE SERPL-MCNC: 1.3 NG/DL (ref 0.9–1.7)
TIBC SERPL-MCNC: 209 UG/DL (ref 250–450)
TRIGL SERPL-MCNC: 43 MG/DL
TROPONIN I SERPL HS-MCNC: 16 NG/L (ref 0–9)
TSH SERPL DL<=0.05 MIU/L-ACNC: 1.35 UIU/ML (ref 0.27–4.2)
VIT B12 SERPL-MCNC: 435 PG/ML (ref 211–946)
VLDLC SERPL CALC-MCNC: 9 MG/DL
WBC OTHER # BLD: 3.7 K/UL (ref 4.5–11.5)

## 2024-06-15 PROCEDURE — 36415 COLL VENOUS BLD VENIPUNCTURE: CPT

## 2024-06-15 PROCEDURE — 6370000000 HC RX 637 (ALT 250 FOR IP)

## 2024-06-15 PROCEDURE — 84439 ASSAY OF FREE THYROXINE: CPT

## 2024-06-15 PROCEDURE — 84484 ASSAY OF TROPONIN QUANT: CPT

## 2024-06-15 PROCEDURE — 85025 COMPLETE CBC W/AUTO DIFF WBC: CPT

## 2024-06-15 PROCEDURE — 2700000000 HC OXYGEN THERAPY PER DAY

## 2024-06-15 PROCEDURE — 82746 ASSAY OF FOLIC ACID SERUM: CPT

## 2024-06-15 PROCEDURE — 82607 VITAMIN B-12: CPT

## 2024-06-15 PROCEDURE — 80053 COMPREHEN METABOLIC PANEL: CPT

## 2024-06-15 PROCEDURE — 94640 AIRWAY INHALATION TREATMENT: CPT

## 2024-06-15 PROCEDURE — 80061 LIPID PANEL: CPT

## 2024-06-15 PROCEDURE — G0378 HOSPITAL OBSERVATION PER HR: HCPCS

## 2024-06-15 PROCEDURE — 6360000002 HC RX W HCPCS

## 2024-06-15 PROCEDURE — 82306 VITAMIN D 25 HYDROXY: CPT

## 2024-06-15 PROCEDURE — 84443 ASSAY THYROID STIM HORMONE: CPT

## 2024-06-15 PROCEDURE — 83540 ASSAY OF IRON: CPT

## 2024-06-15 PROCEDURE — 83550 IRON BINDING TEST: CPT

## 2024-06-15 PROCEDURE — 93306 TTE W/DOPPLER COMPLETE: CPT | Performed by: INTERNAL MEDICINE

## 2024-06-15 RX ORDER — POTASSIUM CHLORIDE 7.45 MG/ML
10 INJECTION INTRAVENOUS PRN
Status: DISCONTINUED | OUTPATIENT
Start: 2024-06-15 | End: 2024-06-18 | Stop reason: HOSPADM

## 2024-06-15 RX ORDER — POTASSIUM CHLORIDE 20 MEQ/1
40 TABLET, EXTENDED RELEASE ORAL PRN
Status: DISCONTINUED | OUTPATIENT
Start: 2024-06-15 | End: 2024-06-18 | Stop reason: HOSPADM

## 2024-06-15 RX ORDER — MAGNESIUM SULFATE 1 G/100ML
1000 INJECTION INTRAVENOUS PRN
Status: DISCONTINUED | OUTPATIENT
Start: 2024-06-15 | End: 2024-06-18 | Stop reason: HOSPADM

## 2024-06-15 RX ADMIN — POTASSIUM CHLORIDE 20 MEQ: 1500 TABLET, EXTENDED RELEASE ORAL at 08:49

## 2024-06-15 RX ADMIN — BUDESONIDE INHALATION 500 MCG: 0.5 SUSPENSION RESPIRATORY (INHALATION) at 16:22

## 2024-06-15 RX ADMIN — METOPROLOL SUCCINATE 50 MG: 50 TABLET, EXTENDED RELEASE ORAL at 21:41

## 2024-06-15 RX ADMIN — ARFORMOTEROL TARTRATE 15 MCG: 15 SOLUTION RESPIRATORY (INHALATION) at 05:13

## 2024-06-15 RX ADMIN — DOCUSATE SODIUM 100 MG: 100 CAPSULE, LIQUID FILLED ORAL at 21:42

## 2024-06-15 RX ADMIN — ARFORMOTEROL TARTRATE 15 MCG: 15 SOLUTION RESPIRATORY (INHALATION) at 16:22

## 2024-06-15 RX ADMIN — APIXABAN 2.5 MG: 2.5 TABLET, FILM COATED ORAL at 21:42

## 2024-06-15 RX ADMIN — ATORVASTATIN CALCIUM 10 MG: 10 TABLET, FILM COATED ORAL at 08:49

## 2024-06-15 RX ADMIN — GABAPENTIN 100 MG: 100 CAPSULE ORAL at 13:24

## 2024-06-15 RX ADMIN — HYDROCODONE BITARTRATE AND ACETAMINOPHEN 1 TABLET: 5; 325 TABLET ORAL at 21:40

## 2024-06-15 RX ADMIN — FUROSEMIDE 40 MG: 40 TABLET ORAL at 08:49

## 2024-06-15 RX ADMIN — HYDROCODONE BITARTRATE AND ACETAMINOPHEN 1 TABLET: 5; 325 TABLET ORAL at 12:08

## 2024-06-15 RX ADMIN — Medication 2000 UNITS: at 12:03

## 2024-06-15 RX ADMIN — LINEZOLID 600 MG: 600 TABLET, FILM COATED ORAL at 21:39

## 2024-06-15 RX ADMIN — GABAPENTIN 100 MG: 100 CAPSULE ORAL at 21:41

## 2024-06-15 RX ADMIN — METOPROLOL SUCCINATE 50 MG: 50 TABLET, EXTENDED RELEASE ORAL at 08:49

## 2024-06-15 RX ADMIN — GABAPENTIN 100 MG: 100 CAPSULE ORAL at 08:49

## 2024-06-15 RX ADMIN — PANTOPRAZOLE SODIUM 40 MG: 40 TABLET, DELAYED RELEASE ORAL at 06:25

## 2024-06-15 RX ADMIN — BUDESONIDE INHALATION 500 MCG: 0.5 SUSPENSION RESPIRATORY (INHALATION) at 05:13

## 2024-06-15 RX ADMIN — APIXABAN 2.5 MG: 2.5 TABLET, FILM COATED ORAL at 08:49

## 2024-06-15 RX ADMIN — LINEZOLID 600 MG: 600 TABLET, FILM COATED ORAL at 08:49

## 2024-06-15 ASSESSMENT — PAIN DESCRIPTION - DESCRIPTORS
DESCRIPTORS: SORE;TENDER;DISCOMFORT
DESCRIPTORS: ACHING;DISCOMFORT;SORE

## 2024-06-15 ASSESSMENT — PAIN - FUNCTIONAL ASSESSMENT
PAIN_FUNCTIONAL_ASSESSMENT: ACTIVITIES ARE NOT PREVENTED
PAIN_FUNCTIONAL_ASSESSMENT: PREVENTS OR INTERFERES SOME ACTIVE ACTIVITIES AND ADLS

## 2024-06-15 ASSESSMENT — PAIN DESCRIPTION - LOCATION
LOCATION: LEG
LOCATION: LEG

## 2024-06-15 ASSESSMENT — PAIN DESCRIPTION - ORIENTATION
ORIENTATION: RIGHT
ORIENTATION: RIGHT

## 2024-06-15 ASSESSMENT — PAIN SCALES - GENERAL
PAINLEVEL_OUTOF10: 6
PAINLEVEL_OUTOF10: 8

## 2024-06-15 NOTE — H&P
[address] [address]                           HISTORY & PHYSICAL      PATIENT NAME: MARLA THOMPSON                 : 1935  MED REC NO: [id number]                        ROOM: 0439  ACCOUNT NO: [id number]                       ADMIT DATE: 2024  PROVIDER: Austen Noguera DO      CHIEF COMPLAINT/HISTORY OF CHIEF COMPLAINT:  This pleasant 89-year-old white female who was admitted to [facility].  The patient presented to the hospital here on 2024.  The patient presented to the emergency room by squad.  The patient was recently discharged to nursing home on 2024.  The patient at this time was maintained here at which time, she did have a prolonged course with what appeared to be septic secondary to right lower extremity wound infection with cellulitis, status post I and D of the right leg abscess.  The patient was discharged to the nursing home for continued treatment and rehab.  During the hospital stay, the patient was here.  She did have chronic comorbidity with underlying COPD with chronic oxygen therapy.  The patient did have paroxysmal atrial fibrillation, maintaining sinus rhythm, and history of pacemaker.  The patient had been on chronic Eliquis therapy.  The patient has been doing well.  According to the family member at the bedside, the patient left the hospital here on 2024.  She did not receive nocturnal medication for irregular heart beat.  It was uncertain at this time whether or not she was here for cardiac medication on the  and definitely did not receive on the .  The patient presented to the hospital here with rapid atrial fibrillation and pulmonary congestion.  She was seen and evaluated at this time.  Upon arrival here, the patient's chest x-ray did not show any overt congestive heart failure, but did show some mild pleural effusion with controlled overall heart  asthma, COPD, COVID in , hearing impairment, heart murmur, hyperlipidemia, hypertension.    PAST SURGICAL HISTORY:  Includes that of appendectomy, back surgery, cholecystectomy, hernia repair, bilateral hip arthroplasty, leg surgery on 2024, by I and D and placement of a wound VAC, pacemaker, and tonsillectomy.    FAMILY HISTORY:  Parents are .    SOCIAL HISTORY:  The patient is currently .  Quit smoking in the .  The patient is currently , mother of 4 children.    LABORATORY AND DIAGNOSTIC DATA:  Review of the chart showed the following, the EKG at this time show atrial fibrillation with rapid ventricular response, but telemetry does show conversion to sinus.  Troponin level is 20, flat trajectory.  Arterial blood gas showed pH 7.365, pCO2 57, PO2 135.  Flu and COVID test are negative.  BUN and creatinine 10 and 0.8 respectively.  Electrolytes were satisfactory.  ProBNP was 3185, and CBC is stable.    PHYSICAL EXAMINATION:  VITAL SIGNS:  Show height to be 5 feet, weight is 92 pounds, BMI 18.02.  Blood pressure is 131/62, pulse is 80, respirations 20, afebrile, O2 saturation 98%.  GENERAL APPEARANCE:  At this time revealed a pleasant 89-year-old white female who is alert, responsive, oriented to person, place, and time.  HEENT:  Normal to gross visual inspection.  NECK:  Revealed adequate carotid upstrokes without bruits.  Trachea midline.  Thyroid not palpable.  LUNGS:  Relatively clear at the present time.  HEART:  Fairly regular with systolic ejection murmur.  No S3, no S4.  ABDOMEN:  Soft.  No guarding, rebound, rigidity.  EXTREMITIES:  No clubbing, cyanosis, or edema.  Dressing was present in the right leg with a wound VAC.  NEUROLOGIC:  Grossly intact.    IMPRESSION:  At this time include:  1. Paroxysmal atrial fibrillation, currently in normal sinus rhythm, status post pacemaker in situ for sick sinus syndrome.  2. Valvular heart disease consisting of mild aortic

## 2024-06-15 NOTE — PROGRESS NOTES
Department of Podiatry  Progress Note    SUBJECTIVE:  Patient is seen at bedside for RLE wound. No acute events overnight. Patient denies any N/V/D/F/C/SOB/CP. No other pedal complaints at this time.     OBJECTIVE:    Scheduled Meds:   Vitamin D  2,000 Units Oral Daily    docusate sodium  100 mg Oral Nightly    apixaban  2.5 mg Oral BID    gabapentin  100 mg Oral TID    atorvastatin  10 mg Oral Daily    budesonide  0.5 mg Nebulization BID RT    arformoterol tartrate  15 mcg Nebulization BID RT    linezolid  600 mg Oral 2 times per day    pantoprazole  40 mg Oral QAM AC    metoprolol succinate  50 mg Oral BID    potassium chloride  20 mEq Oral Daily    furosemide  40 mg Oral Daily     Continuous Infusions:  PRN Meds:.magnesium sulfate, potassium chloride **OR** potassium alternative oral replacement **OR** potassium chloride, sodium phosphate 6.69 mmol in sodium chloride 0.9 % 250 mL IVPB **OR** sodium phosphate 13.38 mmol in sodium chloride 0.9 % 250 mL IVPB, HYDROcodone-acetaminophen, perflutren lipid microspheres    Allergies   Allergen Reactions    Adhesive Tape Other (See Comments)     \"Tears Skin\"        BP (!) 143/62   Pulse 72   Temp 97.7 °F (36.5 °C) (Oral)   Resp 18   Ht 1.524 m (5')   SpO2 99%   BMI 18.02 kg/m²       EXAM:     Pt is AAOx3, NAD     Right lower extremity focused exam (6/15/24):     Vascular Exam:  DP and PT pulses are faintly palpable.  CFT is <5 seconds to the distal pari of all the toes.  Skin temperature is warm to warm from the tibial tuberosity down to the dorsum of all the digits with increased warmth surrounding the wound on the anterior aspect of the leg.  Nonpitting edema is Present     Neuro Exam:  Light touch sensation is intact but decreased.     Dermatologic Exam: Skin is intact and well-hydrated.  There is a full-thickness wound with a granular base noted to the anterior lateral leg.  No bogginess fluctuance crepitance.  No surrounding erythema or edema.  No associated

## 2024-06-15 NOTE — PROGRESS NOTES
component  Psch:   Denies being anxious or depressed.  Musculoskeletal:    Right lower extremity wound is wrapped and dressed.  Denies  myalgias, joint complaints or back pain.   Integumentary:   Denies any rashes, ulcers, or excoriations.  Denies bruising.  Hematologic/Lymphatic:  Denies unexplained bruising or bleeding.      Physical Exam:  No intake/output data recorded.    Intake/Output Summary (Last 24 hours) at 6/15/2024 0817  Last data filed at 6/14/2024 1947  Gross per 24 hour   Intake 240 ml   Output --   Net 240 ml   I/O last 3 completed shifts:  In: 240 [P.O.:240]  Out: -   No data found.  Vital Signs:   Blood pressure (!) 143/62, pulse 72, temperature 97.7 °F (36.5 °C), temperature source Oral, resp. rate 16, height 1.524 m (5'), SpO2 99 %.      General appearance:  Alert, responsive, oriented to person, place, and time.  Acute on chronic ill appearance, comfortable, no distress.  Head:  Normocephalic. No masses, lesions or tenderness.  Eyes:  PERRLA.  EOMI.  Sclera clear.    ENT:  Ears normal. Mucosa normal.  Neck:    Supple. Trachea midline. No thyromegaly. No JVD. No bruits.  Heart:    Rhythm regular. Rate controlled.  S1 and S2.  Systolic murmur.  Normal sinus rhythm with rate in 70s during exam.  Lungs:    Symmetrical.  Diminished air exchange.  Otherwise lungs are clear to auscultation bilaterally.  No wheezes. No rhonchi. No rales.  Abdomen:   Soft. Non-tender. Non-distended. Bowel sounds positive. No organomegaly or masses.  No pain on palpation.  Extremities:    Peripheral pulses present.  Right lower extremity wound is wrapped and dressed, not removed for direct visualization.  There is presently no wound VAC in place.  Neurologic:    Alert x 3.  Generally weak without focal deficit.  Cranial nerves grossly intact. No focal weakness.  Psych:   Behavior is normal. Mood appears normal. Speech is not rapid and/or pressured.  Musculoskeletal:   No unilateral joint edema, erythema or warmth. Gait  0900   Wound Width (cm) 10 cm 06/04/24 0900   Wound Depth (cm) 0.2 cm 06/03/24 1523   Wound Surface Area (cm^2) 90 cm^2 06/04/24 0900   Change in Wound Size % (l*w) -18.42 06/04/24 0900   Wound Volume (cm^3) 15.2 cm^3 06/03/24 1523   Wound Assessment Dusky;Bleeding;Pink/red 06/03/24 1523   Drainage Amount Scant (moist but unmeasurable) 06/14/24 0800   Drainage Description Sanguinous 06/14/24 0800   Odor None 06/14/24 0800   Jess-wound Assessment Maceration;Hyperpigmented 06/14/24 0800   Number of days: 11       Wound 06/14/24 Buttocks (Active)   Dressing Status Clean;Dry;Intact 06/14/24 2015   Odor None 06/14/24 2015   Number of days: 1       Assessment:  Atrial fibrillation with RVR on chronic Eliquis   Recent hospitalization for lower extremity wound infection and cellulitis status post incision and drainage of right leg abscess, excisional wound debridement and application of wound VAC June 5, 2024 by Dr. Dexter with cultures indicating pansensitive Enterococcus faecalis on Zyvox  COPD/asthma without exacerbation  Essential hypertension  Hyperlipidemia  Sick sinus syndrome with pacemaker in situ  Valvular heart disease with mild aortic stenosis, mild mitral stenosis with severe annular calcification, severe tricuspid regurgitation, and moderate pulm hypertension    Plan:   Shauna is an 89-year-old female who was admitted with atrial fibrillation rapid ventricular response largely secondary to involuntary noncompliance of medication therapy.  Patient and family state that she did not receive her scheduled medications while at skilled nursing facility for a couple days leading to rhythm decompensation.  She has converted to normal sinus rhythm at present.  Eliquis has been resumed.  She is being maintained on antibiotic therapy for her recent wound infection and we await the reapplication of wound VAC.  Podiatry is following as well as infectious disease.  Echocardiogram has been ordered for reevaluation of the

## 2024-06-15 NOTE — PLAN OF CARE
Problem: Safety - Adult  Goal: Free from fall injury  6/14/2024 2242 by Martín Garcia, RN  Outcome: Progressing     Problem: Pain  Goal: Verbalizes/displays adequate comfort level or baseline comfort level  Outcome: Progressing

## 2024-06-15 NOTE — PROGRESS NOTES
Patient - Shauna Gaines,  Age - 89 y.o.    - 1935      Room Number - 0439/0439-01   MRN -  [id number]   [facility] # - [id number]  Date of Admission -  2024 10:50 PM      Problem list of patient:     Hospital Problems             Last Modified POA    * (Principal) Acute respiratory failure with hypoxia and hypercapnia (HCC) 2024 Yes       ASSESSMENT/PLAN     Pancytopenia may be die to linezolid last dose today   Rle traumatic wound LE cx with MSSA E faecalis/CONS    S/p linezolid       Suggest consulting wound care/ostomy nurse  Continue wound care  Encourage nutritional support  Continue to off load wound/pressure relief bed    SUBJECTIVE:   DOS  6/15 in bed daughte rpresent no ne c/ dec cough   Rle aced  OBJECTIVE   VITALS    height is 1.524 m (5'). Her oral temperature is 97.7 °F (36.5 °C). Her blood pressure is 143/62 (abnormal) and her pulse is 72. Her respiration is 18 and oxygen saturation is 99%.         General Appearance:  in bed comfortable   HEENT: at/nc   Neck - Supple, no mass  Lungs: dec bs ant   Cardiovascular: s1/s2   Abdomen: soft levon   :  Neurologic: awake andal ert  Skin :  no rash rle aced  Extremities: some edema  Lines:        Peripheral Intravenous Line:  Peripheral IV 24 Left Antecubital (Active)   Site Assessment Dry;Intact;Red 06/15/24 1200   Line Status Normal saline locked 06/15/24 1200   Line Care Connections checked and tightened 06/15/24 1200   Phlebitis Assessment No symptoms 06/15/24 1200   Infiltration Assessment 0 06/15/24 1200   Alcohol Cap Used Yes 06/15/24 1200   Dressing Status Clean, dry & intact 06/15/24 1200   Dressing Type Transparent 06/15/24 1200          MEDICATIONS:      Vitamin D  2,000 Units Oral Daily    docusate sodium  100 mg Oral Nightly    apixaban  2.5 mg Oral BID    gabapentin  100 mg Oral TID    atorvastatin  10 mg Oral Daily    budesonide  0.5 mg Nebulization BID RT     Positive Organism  HEAVY GROWTH        ENTEROCOCCUS FAECALIS MODERATE GROWTH    Susceptibility        Enterococcus faecalis      BACTERIAL SUSCEPTIBILITY PANEL CRISS      ampicillin <=2  Sensitive      Gentamicin, High Level  Sensitive      linezolid 2  Sensitive      vancomycin 1  Sensitive                                   IMAGING:     XR CHEST PORTABLE    Result Date: 6/13/2024  Small bilateral pleural effusions.               Electronically signed by Kelly Briones MD on 6/15/24 at 3:41 PM EDT

## 2024-06-16 ENCOUNTER — APPOINTMENT (OUTPATIENT)
Dept: GENERAL RADIOLOGY | Age: 89
DRG: 193 | End: 2024-06-16
Payer: MEDICARE

## 2024-06-16 LAB
ALBUMIN SERPL-MCNC: 3.1 G/DL (ref 3.5–5.2)
ALP SERPL-CCNC: 85 U/L (ref 35–104)
ALT SERPL-CCNC: 10 U/L (ref 0–32)
ANION GAP SERPL CALCULATED.3IONS-SCNC: 9 MMOL/L (ref 7–16)
AST SERPL-CCNC: 26 U/L (ref 0–31)
B PARAP IS1001 DNA NPH QL NAA+NON-PROBE: NOT DETECTED
B PERT DNA SPEC QL NAA+PROBE: NOT DETECTED
BASOPHILS # BLD: 0 K/UL (ref 0–0.2)
BASOPHILS NFR BLD: 0 % (ref 0–2)
BILIRUB SERPL-MCNC: 0.3 MG/DL (ref 0–1.2)
BUN SERPL-MCNC: 13 MG/DL (ref 6–23)
C PNEUM DNA NPH QL NAA+NON-PROBE: NOT DETECTED
CALCIUM SERPL-MCNC: 8.3 MG/DL (ref 8.6–10.2)
CHLORIDE SERPL-SCNC: 90 MMOL/L (ref 98–107)
CO2 SERPL-SCNC: 34 MMOL/L (ref 22–29)
CREAT SERPL-MCNC: 0.6 MG/DL (ref 0.5–1)
EOSINOPHIL # BLD: 0 K/UL (ref 0.05–0.5)
EOSINOPHILS RELATIVE PERCENT: 0 % (ref 0–6)
ERYTHROCYTE [DISTWIDTH] IN BLOOD BY AUTOMATED COUNT: 13.4 % (ref 11.5–15)
FLUAV RNA NPH QL NAA+NON-PROBE: NOT DETECTED
FLUBV RNA NPH QL NAA+NON-PROBE: NOT DETECTED
GFR, ESTIMATED: 86 ML/MIN/1.73M2
GLUCOSE SERPL-MCNC: 83 MG/DL (ref 74–99)
HADV DNA NPH QL NAA+NON-PROBE: NOT DETECTED
HCOV 229E RNA NPH QL NAA+NON-PROBE: NOT DETECTED
HCOV HKU1 RNA NPH QL NAA+NON-PROBE: NOT DETECTED
HCOV NL63 RNA NPH QL NAA+NON-PROBE: NOT DETECTED
HCOV OC43 RNA NPH QL NAA+NON-PROBE: NOT DETECTED
HCT VFR BLD AUTO: 34.8 % (ref 34–48)
HGB BLD-MCNC: 10.9 G/DL (ref 11.5–15.5)
HMPV RNA NPH QL NAA+NON-PROBE: NOT DETECTED
HPIV1 RNA NPH QL NAA+NON-PROBE: NOT DETECTED
HPIV2 RNA NPH QL NAA+NON-PROBE: NOT DETECTED
HPIV3 RNA NPH QL NAA+NON-PROBE: DETECTED
HPIV4 RNA NPH QL NAA+NON-PROBE: NOT DETECTED
LYMPHOCYTES NFR BLD: 1.39 K/UL (ref 1.5–4)
LYMPHOCYTES RELATIVE PERCENT: 34 % (ref 20–42)
M PNEUMO DNA NPH QL NAA+NON-PROBE: NOT DETECTED
MCH RBC QN AUTO: 29.1 PG (ref 26–35)
MCHC RBC AUTO-ENTMCNC: 31.3 G/DL (ref 32–34.5)
MCV RBC AUTO: 93 FL (ref 80–99.9)
MONOCYTES NFR BLD: 0.21 K/UL (ref 0.1–0.95)
MONOCYTES NFR BLD: 5 % (ref 2–12)
NEUTROPHILS NFR BLD: 61 % (ref 43–80)
NEUTS SEG NFR BLD: 2.5 K/UL (ref 1.8–7.3)
PHOSPHATE SERPL-MCNC: 3 MG/DL (ref 2.5–4.5)
PLATELET # BLD AUTO: 98 K/UL (ref 130–450)
PLATELET CONFIRMATION: NORMAL
PMV BLD AUTO: 11.1 FL (ref 7–12)
POTASSIUM SERPL-SCNC: 3.8 MMOL/L (ref 3.5–5)
PROCALCITONIN SERPL-MCNC: 0.11 NG/ML (ref 0–0.08)
PROT SERPL-MCNC: 5.6 G/DL (ref 6.4–8.3)
RBC # BLD AUTO: 3.74 M/UL (ref 3.5–5.5)
RBC # BLD: ABNORMAL 10*6/UL
RBC # BLD: ABNORMAL 10*6/UL
RSV RNA NPH QL NAA+NON-PROBE: NOT DETECTED
RV+EV RNA NPH QL NAA+NON-PROBE: NOT DETECTED
SARS-COV-2 RNA NPH QL NAA+NON-PROBE: NOT DETECTED
SODIUM SERPL-SCNC: 133 MMOL/L (ref 132–146)
SPECIMEN DESCRIPTION: ABNORMAL
WBC OTHER # BLD: 4.1 K/UL (ref 4.5–11.5)

## 2024-06-16 PROCEDURE — 36415 COLL VENOUS BLD VENIPUNCTURE: CPT

## 2024-06-16 PROCEDURE — 71045 X-RAY EXAM CHEST 1 VIEW: CPT

## 2024-06-16 PROCEDURE — 85025 COMPLETE CBC W/AUTO DIFF WBC: CPT

## 2024-06-16 PROCEDURE — 84145 PROCALCITONIN (PCT): CPT

## 2024-06-16 PROCEDURE — 6360000002 HC RX W HCPCS: Performed by: INTERNAL MEDICINE

## 2024-06-16 PROCEDURE — 84100 ASSAY OF PHOSPHORUS: CPT

## 2024-06-16 PROCEDURE — 94640 AIRWAY INHALATION TREATMENT: CPT

## 2024-06-16 PROCEDURE — G0378 HOSPITAL OBSERVATION PER HR: HCPCS

## 2024-06-16 PROCEDURE — 0202U NFCT DS 22 TRGT SARS-COV-2: CPT

## 2024-06-16 PROCEDURE — 6360000002 HC RX W HCPCS

## 2024-06-16 PROCEDURE — 6370000000 HC RX 637 (ALT 250 FOR IP)

## 2024-06-16 PROCEDURE — 80053 COMPREHEN METABOLIC PANEL: CPT

## 2024-06-16 RX ORDER — ALBUTEROL SULFATE 2.5 MG/3ML
2.5 SOLUTION RESPIRATORY (INHALATION) EVERY 4 HOURS
Status: DISCONTINUED | OUTPATIENT
Start: 2024-06-16 | End: 2024-06-18 | Stop reason: HOSPADM

## 2024-06-16 RX ORDER — ACETYLCYSTEINE 100 MG/ML
4 SOLUTION ORAL; RESPIRATORY (INHALATION) EVERY 4 HOURS
Status: DISCONTINUED | OUTPATIENT
Start: 2024-06-16 | End: 2024-06-18 | Stop reason: HOSPADM

## 2024-06-16 RX ADMIN — GABAPENTIN 100 MG: 100 CAPSULE ORAL at 15:08

## 2024-06-16 RX ADMIN — ATORVASTATIN CALCIUM 10 MG: 10 TABLET, FILM COATED ORAL at 09:36

## 2024-06-16 RX ADMIN — ACETYLCYSTEINE 400 MG: 100 INHALANT RESPIRATORY (INHALATION) at 22:48

## 2024-06-16 RX ADMIN — PANTOPRAZOLE SODIUM 40 MG: 40 TABLET, DELAYED RELEASE ORAL at 06:26

## 2024-06-16 RX ADMIN — METOPROLOL SUCCINATE 50 MG: 50 TABLET, EXTENDED RELEASE ORAL at 21:13

## 2024-06-16 RX ADMIN — ALBUTEROL SULFATE 2.5 MG: 2.5 SOLUTION RESPIRATORY (INHALATION) at 13:20

## 2024-06-16 RX ADMIN — FUROSEMIDE 40 MG: 40 TABLET ORAL at 09:36

## 2024-06-16 RX ADMIN — LINEZOLID 600 MG: 600 TABLET, FILM COATED ORAL at 09:37

## 2024-06-16 RX ADMIN — POTASSIUM CHLORIDE 20 MEQ: 1500 TABLET, EXTENDED RELEASE ORAL at 09:37

## 2024-06-16 RX ADMIN — BUDESONIDE INHALATION 500 MCG: 0.5 SUSPENSION RESPIRATORY (INHALATION) at 18:22

## 2024-06-16 RX ADMIN — ACETYLCYSTEINE 400 MG: 100 INHALANT RESPIRATORY (INHALATION) at 13:20

## 2024-06-16 RX ADMIN — ALBUTEROL SULFATE 2.5 MG: 2.5 SOLUTION RESPIRATORY (INHALATION) at 09:31

## 2024-06-16 RX ADMIN — METOPROLOL SUCCINATE 50 MG: 50 TABLET, EXTENDED RELEASE ORAL at 09:37

## 2024-06-16 RX ADMIN — APIXABAN 2.5 MG: 2.5 TABLET, FILM COATED ORAL at 21:13

## 2024-06-16 RX ADMIN — GABAPENTIN 100 MG: 100 CAPSULE ORAL at 09:36

## 2024-06-16 RX ADMIN — ALBUTEROL SULFATE 2.5 MG: 2.5 SOLUTION RESPIRATORY (INHALATION) at 18:22

## 2024-06-16 RX ADMIN — ALBUTEROL SULFATE 2.5 MG: 2.5 SOLUTION RESPIRATORY (INHALATION) at 22:48

## 2024-06-16 RX ADMIN — APIXABAN 2.5 MG: 2.5 TABLET, FILM COATED ORAL at 09:37

## 2024-06-16 RX ADMIN — ARFORMOTEROL TARTRATE 15 MCG: 15 SOLUTION RESPIRATORY (INHALATION) at 06:01

## 2024-06-16 RX ADMIN — BUDESONIDE INHALATION 500 MCG: 0.5 SUSPENSION RESPIRATORY (INHALATION) at 06:01

## 2024-06-16 RX ADMIN — Medication 2000 UNITS: at 09:36

## 2024-06-16 RX ADMIN — GABAPENTIN 100 MG: 100 CAPSULE ORAL at 21:13

## 2024-06-16 RX ADMIN — HYDROCODONE BITARTRATE AND ACETAMINOPHEN 1 TABLET: 5; 325 TABLET ORAL at 21:13

## 2024-06-16 RX ADMIN — ACETYLCYSTEINE 400 MG: 100 INHALANT RESPIRATORY (INHALATION) at 09:33

## 2024-06-16 RX ADMIN — DOCUSATE SODIUM 100 MG: 100 CAPSULE, LIQUID FILLED ORAL at 21:13

## 2024-06-16 RX ADMIN — LINEZOLID 600 MG: 600 TABLET, FILM COATED ORAL at 21:13

## 2024-06-16 RX ADMIN — ACETYLCYSTEINE 400 MG: 100 INHALANT RESPIRATORY (INHALATION) at 18:22

## 2024-06-16 RX ADMIN — ARFORMOTEROL TARTRATE 15 MCG: 15 SOLUTION RESPIRATORY (INHALATION) at 18:22

## 2024-06-16 ASSESSMENT — PAIN DESCRIPTION - ONSET: ONSET: ON-GOING

## 2024-06-16 ASSESSMENT — PAIN SCALES - GENERAL: PAINLEVEL_OUTOF10: 7

## 2024-06-16 ASSESSMENT — PAIN DESCRIPTION - FREQUENCY: FREQUENCY: CONTINUOUS

## 2024-06-16 ASSESSMENT — PAIN DESCRIPTION - LOCATION: LOCATION: LEG

## 2024-06-16 ASSESSMENT — PAIN DESCRIPTION - PAIN TYPE: TYPE: ACUTE PAIN;SURGICAL PAIN

## 2024-06-16 ASSESSMENT — PAIN DESCRIPTION - ORIENTATION: ORIENTATION: RIGHT

## 2024-06-16 ASSESSMENT — PAIN DESCRIPTION - DESCRIPTORS: DESCRIPTORS: ACHING;DISCOMFORT;SORE;NAGGING

## 2024-06-16 NOTE — PROGRESS NOTES
Department of Podiatry  Progress Note    SUBJECTIVE:  Patient is seen at bedside for RLE wound. Daughter present at bedside. States that she is getting a chest xray today. No acute events overnight. Patient denies any N/V/D/F/C/SOB/CP. No other pedal complaints at this time.     OBJECTIVE:    Scheduled Meds:   acetylcysteine  4 mL Inhalation Q4H    albuterol  2.5 mg Nebulization Q4H    Vitamin D  2,000 Units Oral Daily    docusate sodium  100 mg Oral Nightly    apixaban  2.5 mg Oral BID    gabapentin  100 mg Oral TID    atorvastatin  10 mg Oral Daily    budesonide  0.5 mg Nebulization BID RT    arformoterol tartrate  15 mcg Nebulization BID RT    linezolid  600 mg Oral 2 times per day    pantoprazole  40 mg Oral QAM AC    metoprolol succinate  50 mg Oral BID    potassium chloride  20 mEq Oral Daily    furosemide  40 mg Oral Daily     Continuous Infusions:  PRN Meds:.magnesium sulfate, potassium chloride **OR** potassium alternative oral replacement **OR** potassium chloride, sodium phosphate 6.69 mmol in sodium chloride 0.9 % 250 mL IVPB **OR** sodium phosphate 13.38 mmol in sodium chloride 0.9 % 250 mL IVPB, HYDROcodone-acetaminophen, perflutren lipid microspheres    Allergies   Allergen Reactions    Adhesive Tape Other (See Comments)     \"Tears Skin\"        BP (!) 157/68   Pulse 67   Temp 98.1 °F (36.7 °C) (Oral)   Resp 20   Ht 1.524 m (5')   SpO2 97%   BMI 18.02 kg/m²       EXAM:     Pt is AAOx3, NAD     Right lower extremity focused exam (6/15/24):     Vascular Exam:  DP and PT pulses are faintly palpable.  CFT is <5 seconds to the distal pari of all the toes.  Skin temperature is warm to warm from the tibial tuberosity down to the dorsum of all the digits with increased warmth surrounding the wound on the anterior aspect of the leg.  Nonpitting edema is Present     Neuro Exam:  Light touch sensation is intact but decreased.     Dermatologic Exam: Skin is intact and well-hydrated.  There is a  full-thickness wound with a granular base noted to the anterior lateral leg.  No bogginess fluctuance crepitance.  No surrounding erythema or edema.  No associated malodor or purulence noted.  No other acute signs of infection.     MSK: Muscle strength 5/5 Right  ROM is full without pain or crepitation bilateral.  Pain on palpation Right leg        Scheduled Meds:   acetylcysteine  4 mL Inhalation Q4H    albuterol  2.5 mg Nebulization Q4H    Vitamin D  2,000 Units Oral Daily    docusate sodium  100 mg Oral Nightly    apixaban  2.5 mg Oral BID    gabapentin  100 mg Oral TID    atorvastatin  10 mg Oral Daily    budesonide  0.5 mg Nebulization BID RT    arformoterol tartrate  15 mcg Nebulization BID RT    linezolid  600 mg Oral 2 times per day    pantoprazole  40 mg Oral QAM AC    metoprolol succinate  50 mg Oral BID    potassium chloride  20 mEq Oral Daily    furosemide  40 mg Oral Daily     Continuous Infusions:  PRN Meds:.magnesium sulfate, potassium chloride **OR** potassium alternative oral replacement **OR** potassium chloride, sodium phosphate 6.69 mmol in sodium chloride 0.9 % 250 mL IVPB **OR** sodium phosphate 13.38 mmol in sodium chloride 0.9 % 250 mL IVPB, HYDROcodone-acetaminophen, perflutren lipid microspheres    RADIOLOGY:  XR CHEST PORTABLE   Final Result   1. Mild interval increase in the small left pleural effusion when compared to   the previous study performed 06/13/2024.  No significant interval change in   the small right pleural effusion.   2. Chronic interstitial lung changes again noted.         XR CHEST PORTABLE   Final Result   Small bilateral pleural effusions.         Fluoroscopy modified barium swallow with video    (Results Pending)     BP (!) 157/68   Pulse 67   Temp 98.1 °F (36.7 °C) (Oral)   Resp 20   Ht 1.524 m (5')   SpO2 97%   BMI 18.02 kg/m²     LABS:    Recent Labs     06/15/24  0655 06/16/24  0639   WBC 3.7* 4.1*   HGB 10.8* 10.9*   HCT 34.2 34.8   * 98*

## 2024-06-16 NOTE — PROGRESS NOTES
Internal Medicine Progress Note     [facility]     Austen Noguera D.O., MIKE Jones D.O., MIKE Francis D.O.     Lupe Reid, MSN, APRN, NP-C  Dann Lin, MSN, APRN-CNP  Kasi De Paz, MSN, APRN, NP-C  Yvonne Epperson, MSN, APRN-CNP  Jacinda Zhu, MSN, APRN, NP-C     Primary Care Physician: Ross Mercer MD   Admitting Physician:  Austen Noguera DO  Admission date and time: 6/13/2024 10:50 PM    Room:  [address] [place] [address]  Admitting diagnosis: Atrial fibrillation with rapid ventricular response (HCC) [I48.91]  Acute respiratory failure with hypoxia and hypercapnia (HCC) [J96.01, J96.02]  Acute on chronic respiratory failure with hypoxia and hypercapnia (HCC) [J96.21, J96.22]    Patient Name: Shauna Gaines  MRN: [id number]    Date of Service: 6/16/2024     Subjective:  Shauna is a 89 y.o. female who was seen and examined today,6/16/2024, at the bedside.  Pain remains well-controlled and she remains free of any cardiac symptomatology.  Echocardiogram results were reviewed with the patient.  She is visibly more short of breath today and admits to some coughing with oral intake.  We discussed chest x-ray and speech consultation and she is agreeable.  She voices no additional symptoms or concerns.  No additional complaints or concerns. No family present during my examination.    Review of systems:  Constitutional:   + fatigue and malaise , - fever/chills  HEENT:   Denies ear pain, sore throat, sinus or eye problems.  Cardiovascular:   - chest pain, irregular heartbeats, or palpitations.   Respiratory:   + congestion and dyspnea at rest, - dyspnea on exertion, - coughing, - sputum, - hemoptysis  Gastrointestinal:   - nausea, -vomiting. - diarrhea, - constipation. - poor appetite and poor intake. - abdominal pain.  Genitourinary:    Denies any urgency, frequency, hematuria. Voiding  without difficulty.  Extremities:   Right lower extremity  wound is wrapped and dressed.  Otherwise denies lower extremity swelling, edema or cyanosis.   Neurology:    Denies any headache or focal neurological deficits, positive for generalized weakness and fatigue without focal component  Psch:   Denies being anxious or depressed.  Musculoskeletal:    Right lower extremity wound is wrapped and dressed.  Denies  myalgias, joint complaints or back pain.   Integumentary:   Denies any rashes, ulcers, or excoriations.  Denies bruising.  Hematologic/Lymphatic:  Denies unexplained bruising or bleeding.      Physical Exam:  No intake/output data recorded.    Intake/Output Summary (Last 24 hours) at 6/16/2024 0810  Last data filed at 6/16/2024 0555  Gross per 24 hour   Intake --   Output 250 ml   Net -250 ml     I/O last 3 completed shifts:  In: 480 [P.O.:480]  Out: 250 [Urine:250]  No data found.  Vital Signs:   Blood pressure (!) 157/68, pulse 67, temperature 98.1 °F (36.7 °C), temperature source Oral, resp. rate 20, height 1.524 m (5'), SpO2 97 %.      General appearance:  Alert, responsive, oriented to person, place, and time.  Acute on chronic ill appearance, comfortable, no distress.  Head:  Normocephalic. No masses, lesions or tenderness.  Eyes:  PERRLA.  EOMI.  Sclera clear.    ENT:  Ears normal. Mucosa normal.  Neck:    Supple. Trachea midline. No thyromegaly. No JVD. No bruits.  Heart:    Rhythm regular. Rate controlled.  S1 and S2.  Systolic murmur.  Normal sinus rhythm with rate in 70s during exam.  Lungs:    Symmetrical.  Diminished air exchange.  Scattered coarse breath sounds throughout.    Abdomen:   Soft. Non-tender. Non-distended. Bowel sounds positive. No organomegaly or masses.  No pain on palpation.  Extremities:    Peripheral pulses present.  Right lower extremity wound is wrapped and dressed, not removed for direct visualization.  There is presently no wound VAC in place.  Neurologic:    Alert x 3.  Generally weak without focal deficit.  Cranial nerves

## 2024-06-16 NOTE — PROGRESS NOTES
Patient - Shauna Gaines,  Age - 89 y.o.    - 1935      Room Number - 0439/0439-01   MRN -  [id number]   [facility] # - [id number]  Date of Admission -  2024 10:50 PM      Problem list of patient:     Hospital Problems             Last Modified POA    * (Principal) Acute respiratory failure with hypoxia and hypercapnia (HCC) 2024 Yes     ASSESSMENT/PLAN     Pancytopenia may be die to linezolid last dose today   Rle traumatic wound LE cx with MSSA E faecalis/CONS    S/p linezolid     Follow off atbx   Cough encourage IS OOB flutter check procal   Suggest consulting wound care/ostomy nurse  Continue wound care  Encourage nutritional support  Continue to off load wound/pressure relief bed    SUBJECTIVE:   DOS   in bed has cough daughter present rle vacc on   6/15 in bed daughte rpresent no ne c/ dec cough   Rle aced  OBJECTIVE   VITALS    height is 1.524 m (5'). Her oral temperature is 98.1 °F (36.7 °C). Her blood pressure is 157/68 (abnormal) and her pulse is 67. Her respiration is 20 and oxygen saturation is 97%.         General Appearance:  in bed comfortable   HEENT: at/nc   Neck - Supple, no mass  Lungs: dec bs ant  some rales ant   Cardiovascular: s1/s2  murmur  Abdomen: soft levon   :  Neurologic: awake and alert  Skin :  no rash rle vacc  Extremities: some edema  Lines:        Peripheral Intravenous Line:  Peripheral IV 24 Left Antecubital (Active)   Site Assessment Dry;Intact;Red 06/15/24 1200   Line Status Normal saline locked 06/15/24 1200   Line Care Connections checked and tightened 06/15/24 1200   Phlebitis Assessment No symptoms 06/15/24 1200   Infiltration Assessment 0 06/15/24 1200   Alcohol Cap Used Yes 06/15/24 1200   Dressing Status Clean, dry & intact 06/15/24 1200   Dressing Type Transparent 06/15/24 1200          MEDICATIONS:      acetylcysteine  4 mL Inhalation Q4H    albuterol  2.5 mg Nebulization Q4H

## 2024-06-17 ENCOUNTER — APPOINTMENT (OUTPATIENT)
Dept: GENERAL RADIOLOGY | Age: 89
DRG: 193 | End: 2024-06-17
Payer: MEDICARE

## 2024-06-17 PROBLEM — J96.01 ACUTE RESPIRATORY FAILURE WITH HYPOXIA (HCC): Status: ACTIVE | Noted: 2024-06-17

## 2024-06-17 LAB
ALBUMIN SERPL-MCNC: 3.1 G/DL (ref 3.5–5.2)
ALP SERPL-CCNC: 83 U/L (ref 35–104)
ALT SERPL-CCNC: 8 U/L (ref 0–32)
ANION GAP SERPL CALCULATED.3IONS-SCNC: 8 MMOL/L (ref 7–16)
AST SERPL-CCNC: 21 U/L (ref 0–31)
BASOPHILS # BLD: 0.04 K/UL (ref 0–0.2)
BASOPHILS NFR BLD: 1 % (ref 0–2)
BILIRUB SERPL-MCNC: 0.4 MG/DL (ref 0–1.2)
BUN SERPL-MCNC: 11 MG/DL (ref 6–23)
CALCIUM SERPL-MCNC: 8.3 MG/DL (ref 8.6–10.2)
CHLORIDE SERPL-SCNC: 91 MMOL/L (ref 98–107)
CO2 SERPL-SCNC: 34 MMOL/L (ref 22–29)
CREAT SERPL-MCNC: 0.6 MG/DL (ref 0.5–1)
EOSINOPHIL # BLD: 0 K/UL (ref 0.05–0.5)
EOSINOPHILS RELATIVE PERCENT: 0 % (ref 0–6)
ERYTHROCYTE [DISTWIDTH] IN BLOOD BY AUTOMATED COUNT: 13.2 % (ref 11.5–15)
GFR, ESTIMATED: 86 ML/MIN/1.73M2
GLUCOSE SERPL-MCNC: 132 MG/DL (ref 74–99)
HCT VFR BLD AUTO: 33.2 % (ref 34–48)
HGB BLD-MCNC: 10.5 G/DL (ref 11.5–15.5)
LYMPHOCYTES NFR BLD: 1.2 K/UL (ref 1.5–4)
LYMPHOCYTES RELATIVE PERCENT: 25 % (ref 20–42)
MAGNESIUM SERPL-MCNC: 1.5 MG/DL (ref 1.6–2.6)
MCH RBC QN AUTO: 29.4 PG (ref 26–35)
MCHC RBC AUTO-ENTMCNC: 31.6 G/DL (ref 32–34.5)
MCV RBC AUTO: 93 FL (ref 80–99.9)
MONOCYTES NFR BLD: 0.26 K/UL (ref 0.1–0.95)
MONOCYTES NFR BLD: 5 % (ref 2–12)
NEUTROPHILS NFR BLD: 69 % (ref 43–80)
NEUTS SEG NFR BLD: 3.4 K/UL (ref 1.8–7.3)
PHOSPHATE SERPL-MCNC: 2.7 MG/DL (ref 2.5–4.5)
PLATELET CONFIRMATION: NORMAL
PLATELET, FLUORESCENCE: 70 K/UL (ref 130–450)
PMV BLD AUTO: 10.7 FL (ref 7–12)
POTASSIUM SERPL-SCNC: 3.6 MMOL/L (ref 3.5–5)
PROT SERPL-MCNC: 5.6 G/DL (ref 6.4–8.3)
RBC # BLD AUTO: 3.57 M/UL (ref 3.5–5.5)
RBC # BLD: ABNORMAL 10*6/UL
SODIUM SERPL-SCNC: 133 MMOL/L (ref 132–146)
WBC OTHER # BLD: 4.9 K/UL (ref 4.5–11.5)

## 2024-06-17 PROCEDURE — 85025 COMPLETE CBC W/AUTO DIFF WBC: CPT

## 2024-06-17 PROCEDURE — 6360000002 HC RX W HCPCS: Performed by: INTERNAL MEDICINE

## 2024-06-17 PROCEDURE — 97535 SELF CARE MNGMENT TRAINING: CPT

## 2024-06-17 PROCEDURE — 84100 ASSAY OF PHOSPHORUS: CPT

## 2024-06-17 PROCEDURE — 97530 THERAPEUTIC ACTIVITIES: CPT

## 2024-06-17 PROCEDURE — 6360000002 HC RX W HCPCS

## 2024-06-17 PROCEDURE — G0378 HOSPITAL OBSERVATION PER HR: HCPCS

## 2024-06-17 PROCEDURE — 36415 COLL VENOUS BLD VENIPUNCTURE: CPT

## 2024-06-17 PROCEDURE — 83735 ASSAY OF MAGNESIUM: CPT

## 2024-06-17 PROCEDURE — 97165 OT EVAL LOW COMPLEX 30 MIN: CPT

## 2024-06-17 PROCEDURE — 6370000000 HC RX 637 (ALT 250 FOR IP)

## 2024-06-17 PROCEDURE — 1200000000 HC SEMI PRIVATE

## 2024-06-17 PROCEDURE — 6370000000 HC RX 637 (ALT 250 FOR IP): Performed by: NURSE PRACTITIONER

## 2024-06-17 PROCEDURE — 92611 MOTION FLUOROSCOPY/SWALLOW: CPT | Performed by: SPEECH-LANGUAGE PATHOLOGIST

## 2024-06-17 PROCEDURE — 6360000002 HC RX W HCPCS: Performed by: NURSE PRACTITIONER

## 2024-06-17 PROCEDURE — 74230 X-RAY XM SWLNG FUNCJ C+: CPT

## 2024-06-17 PROCEDURE — 80053 COMPREHEN METABOLIC PANEL: CPT

## 2024-06-17 PROCEDURE — 94640 AIRWAY INHALATION TREATMENT: CPT

## 2024-06-17 PROCEDURE — 2500000003 HC RX 250 WO HCPCS: Performed by: INTERNAL MEDICINE

## 2024-06-17 RX ORDER — PREDNISONE 20 MG/1
40 TABLET ORAL
Status: DISCONTINUED | OUTPATIENT
Start: 2024-06-17 | End: 2024-06-18 | Stop reason: HOSPADM

## 2024-06-17 RX ADMIN — ARFORMOTEROL TARTRATE 15 MCG: 15 SOLUTION RESPIRATORY (INHALATION) at 05:00

## 2024-06-17 RX ADMIN — ACETYLCYSTEINE 400 MG: 100 INHALANT RESPIRATORY (INHALATION) at 05:00

## 2024-06-17 RX ADMIN — APIXABAN 2.5 MG: 2.5 TABLET, FILM COATED ORAL at 09:32

## 2024-06-17 RX ADMIN — GABAPENTIN 100 MG: 100 CAPSULE ORAL at 21:01

## 2024-06-17 RX ADMIN — BUDESONIDE INHALATION 500 MCG: 0.5 SUSPENSION RESPIRATORY (INHALATION) at 05:00

## 2024-06-17 RX ADMIN — ACETYLCYSTEINE 400 MG: 100 INHALANT RESPIRATORY (INHALATION) at 09:09

## 2024-06-17 RX ADMIN — MAGNESIUM SULFATE IN DEXTROSE 1000 MG: 10 INJECTION, SOLUTION INTRAVENOUS at 19:32

## 2024-06-17 RX ADMIN — ALBUTEROL SULFATE 2.5 MG: 2.5 SOLUTION RESPIRATORY (INHALATION) at 14:25

## 2024-06-17 RX ADMIN — ACETYLCYSTEINE 400 MG: 100 INHALANT RESPIRATORY (INHALATION) at 14:25

## 2024-06-17 RX ADMIN — HYDROCODONE BITARTRATE AND ACETAMINOPHEN 1 TABLET: 5; 325 TABLET ORAL at 21:01

## 2024-06-17 RX ADMIN — ALBUTEROL SULFATE 2.5 MG: 2.5 SOLUTION RESPIRATORY (INHALATION) at 09:08

## 2024-06-17 RX ADMIN — METOPROLOL SUCCINATE 50 MG: 50 TABLET, EXTENDED RELEASE ORAL at 09:29

## 2024-06-17 RX ADMIN — POTASSIUM CHLORIDE 20 MEQ: 1500 TABLET, EXTENDED RELEASE ORAL at 09:30

## 2024-06-17 RX ADMIN — BARIUM SULFATE 45 G: 0.81 POWDER, FOR SUSPENSION ORAL at 11:28

## 2024-06-17 RX ADMIN — GABAPENTIN 100 MG: 100 CAPSULE ORAL at 14:08

## 2024-06-17 RX ADMIN — METOPROLOL SUCCINATE 50 MG: 50 TABLET, EXTENDED RELEASE ORAL at 21:01

## 2024-06-17 RX ADMIN — PREDNISONE 40 MG: 20 TABLET ORAL at 09:28

## 2024-06-17 RX ADMIN — ACETYLCYSTEINE 400 MG: 100 INHALANT RESPIRATORY (INHALATION) at 18:47

## 2024-06-17 RX ADMIN — Medication 2000 UNITS: at 09:31

## 2024-06-17 RX ADMIN — BARIUM SULFATE 45 ML: 400 SUSPENSION ORAL at 11:28

## 2024-06-17 RX ADMIN — ACETYLCYSTEINE 400 MG: 100 INHALANT RESPIRATORY (INHALATION) at 22:12

## 2024-06-17 RX ADMIN — MAGNESIUM SULFATE IN DEXTROSE 1000 MG: 10 INJECTION, SOLUTION INTRAVENOUS at 21:01

## 2024-06-17 RX ADMIN — PANTOPRAZOLE SODIUM 40 MG: 40 TABLET, DELAYED RELEASE ORAL at 06:03

## 2024-06-17 RX ADMIN — HYDROCODONE BITARTRATE AND ACETAMINOPHEN 1 TABLET: 5; 325 TABLET ORAL at 09:35

## 2024-06-17 RX ADMIN — ATORVASTATIN CALCIUM 10 MG: 10 TABLET, FILM COATED ORAL at 09:29

## 2024-06-17 RX ADMIN — ALBUTEROL SULFATE 2.5 MG: 2.5 SOLUTION RESPIRATORY (INHALATION) at 05:00

## 2024-06-17 RX ADMIN — LINEZOLID 600 MG: 600 TABLET, FILM COATED ORAL at 09:32

## 2024-06-17 RX ADMIN — ARFORMOTEROL TARTRATE 15 MCG: 15 SOLUTION RESPIRATORY (INHALATION) at 18:47

## 2024-06-17 RX ADMIN — GABAPENTIN 100 MG: 100 CAPSULE ORAL at 09:31

## 2024-06-17 RX ADMIN — BARIUM SULFATE 45 ML: 400 PASTE ORAL at 11:27

## 2024-06-17 RX ADMIN — DOCUSATE SODIUM 100 MG: 100 CAPSULE, LIQUID FILLED ORAL at 21:01

## 2024-06-17 RX ADMIN — ALBUTEROL SULFATE 2.5 MG: 2.5 SOLUTION RESPIRATORY (INHALATION) at 22:12

## 2024-06-17 RX ADMIN — FUROSEMIDE 40 MG: 40 TABLET ORAL at 09:30

## 2024-06-17 RX ADMIN — APIXABAN 2.5 MG: 2.5 TABLET, FILM COATED ORAL at 21:01

## 2024-06-17 RX ADMIN — ALBUTEROL SULFATE 2.5 MG: 2.5 SOLUTION RESPIRATORY (INHALATION) at 18:47

## 2024-06-17 RX ADMIN — BUDESONIDE INHALATION 500 MCG: 0.5 SUSPENSION RESPIRATORY (INHALATION) at 18:47

## 2024-06-17 ASSESSMENT — PAIN SCALES - GENERAL
PAINLEVEL_OUTOF10: 6
PAINLEVEL_OUTOF10: 7
PAINLEVEL_OUTOF10: 3

## 2024-06-17 ASSESSMENT — PAIN DESCRIPTION - ORIENTATION: ORIENTATION: RIGHT

## 2024-06-17 ASSESSMENT — PAIN DESCRIPTION - LOCATION
LOCATION: LEG
LOCATION: LEG

## 2024-06-17 ASSESSMENT — PAIN DESCRIPTION - DESCRIPTORS: DESCRIPTORS: ACHING

## 2024-06-17 NOTE — PROGRESS NOTES
SPEECH/LANGUAGE PATHOLOGY  VIDEOFLUOROSCOPIC STUDY OF SWALLOWING (MBS)   and PLAN OF CARE    PATIENT NAME:  Shauna Gaines  (female)     MRN:  [id number]    :  1935  (89 y.o.)  STATUS:  Inpatient: Room 0439/0439-01    TODAY'S DATE:  2024  REFERRING PROVIDER:   SLP video swallow  Start:  24 0900,   End:  24,   ONE TIME,   Standing Count:  1 Occurrences,   R       Mj Jones DO  REASON FOR REFERRAL:Assess for aspiration    EVALUATING THERAPIST: Leilani Mandujano, CECILIA      RESULTS:      DYSPHAGIA DIAGNOSIS:  normal swallow function-----Cough was present during intake but not related to airway compromise      DIET RECOMMENDATIONS:  Regular consistency solids (IDDSI level 7) with  thin liquids (IDDSI level 0)    FEEDING RECOMMENDATIONS:    Assistance level:  Not applicable     Compensatory strategies recommended: No strategies are recommended at this time     Discussed recommendations with:  charge nurse in person    SPEECH THERAPY  PLAN OF CARE   The dysphagia POC is established based on physician order and dysphagia diagnosis    Dysphagia therapy is not recommended       Conditions Requiring Skilled Therapeutic Intervention for dysphagia:    not applicable    SPECIFIC DYSPHAGIA INTERVENTIONS TO INCLUDE:     Not applicable    Specific instructions for next treatment:  not applicable   Treatment Goals:    Short Term Goals:  Not applicable no therapy warranted     Long Term Goals:   Not applicable no therapy warranted      Patient/family Goal:    not applicable                    ADMITTING DIAGNOSIS: Atrial fibrillation with rapid ventricular response (HCC) [I48.91]  Acute respiratory failure with hypoxia and hypercapnia (HCC) [J96.01, J96.02]  Acute on chronic respiratory failure with hypoxia and hypercapnia (HCC) [J96.21, J96.22]  Acute respiratory failure with hypoxia (HCC) [J96.01]     VISIT DIAGNOSIS:   Visit Diagnoses         Codes    Acute on chronic respiratory failure with hypoxia and

## 2024-06-17 NOTE — PROGRESS NOTES
Occupational Therapy  OCCUPATIONAL THERAPY INITIAL EVALUATION    [facility]      Date:2024                                                Patient Name: Shauna Gaines  MRN: [id number]  : 1935  Room: [address] [place] [address]    Evaluating OT:MICHELLE Berumen/LAURA #[id number]    Referring Provider: Dann Lin APRN - CNP   Specific Provider Orders/Date: OT eval and treat 24   Placement Recommendation:      Diagnosis: Atrial fibrillation with rapid ventricular response (HCC) [I48.91]  Acute respiratory failure with hypoxia and hypercapnia (HCC) [J96.01, J96.02]  Acute on chronic respiratory failure with hypoxia and hypercapnia (HCC) [J96.21, J96.22]  Acute respiratory failure with hypoxia (HCC) [J96.01]   Pt admitted to hospital on 24 from NH with SOB    Pertinent Medical History:  has a past medical history of Aortic valve stenosis, Asthma, COPD (chronic obstructive pulmonary disease) ([facility]), COVID, Hearing aid worn, Heart murmur, Hyperlipidemia, and Hypertension.   S/p incision and drainage with wound VAC application right lower extremity 2024     Precautions:  Fall Risk, contact isolation (parainfluenza), WBAT R LE (per DPM  order), wound vac to R LE, fluid restriction, O2,     Assessment of current deficits    [x] Functional mobility  [x]ADLs  [x] Strength               []Cognition    [x] Functional transfers   [x] IADLs         [x] Safety Awareness   [x]Endurance    [] Fine Coordination              [x] Balance      [] Vision/perception   []Sensation     []Gross Motor Coordination  [] ROM  [] Delirium                   [] Motor Control     OT PLAN OF CARE   OT POC based on physician orders, patient diagnosis and results of clinical assessment    Frequency/Duration 1-3 days/wk for 2 weeks PRN   Specific OT Treatment Interventions to include:   * Instruction/training on adapted ADL techniques and AE recommendations to increase functional independence within precautions

## 2024-06-17 NOTE — PROGRESS NOTES
Physical Therapy    Physical Therapy Treatment Note/Plan of Care    Room #:  0439/0439-01  Patient Name: Shauna Gaines  YOB: 1935  MRN: [id number]    Date of Service: 6/17/2024     Tentative placement recommendation: Subacute Rehab vs home p.t. and 24/7 d/t wound vac and o2 line   Equipment recommendation: Wheelchair  vs transport chair pending wt bearing status    Evaluating Physical Therapist: Toy Martinez, PT  #[id number]      Specific Provider Orders/Date/Referring Provider :  PT eval and treat  Start:  06/14/24 0845,   End:  06/14/24 0845,   ONE TIME,   Standing Count:  1 Occurrences,   R       Austen Noguera DO    Admitting Diagnosis:   Atrial fibrillation with rapid ventricular response (HCC) [I48.91]  Acute respiratory failure with hypoxia and hypercapnia (HCC) [J96.01, J96.02]  Acute on chronic respiratory failure with hypoxia and hypercapnia (HCC) [J96.21, J96.22]  Acute respiratory failure with hypoxia (HCC) [J96.01]     Admitted with    shortness of breath   Surgery: none  Visit Diagnoses         Codes    Acute on chronic respiratory failure with hypoxia and hypercapnia (HCC)    -  Primary J96.21, J96.22    Atrial fibrillation with rapid ventricular response (HCC)     I48.91            Patient Active Problem List   Diagnosis    Choledocholithiasis    Dilated cbd, acquired    Cellulitis of right leg    Cellulitis    Wound of right leg    Severe protein-calorie malnutrition (HCC)    Acute respiratory failure with hypoxia and hypercapnia (HCC)    Acute respiratory failure with hypoxia (HCC)        ASSESSMENT of Current Deficits Patient exhibits decreased strength, balance, and endurance impairing functional mobility, transfers, gait , gait distance, and tolerance to activity are barriers to d/c and require skilled intervention to address concerns listed above to increase safety and independence at discharge.   Decreased strength, balance and endurance  increases patient's risk for fall.  Initial Evaluation  Date: 6/14/2024 Treatment Date:  6/17/2024       Short Term/ Long Term   Goals   Was pt agreeable to Eval/treatment? Yes yes To be met in 3 days   Pain level   0/10    3/10  Right leg    Bed Mobility  Using rails and head of bed elevated:     Rolling: Supervision     Supine to sit: Supervision     Sit to supine: Supervision     Scooting: Supervision    Using rails and head of bed elevated:     Rolling: Supervision    Supine to sit: Supervision    Sit to supine: Supervision    Scooting: Supervision     Rolling: Independent    Supine to sit: Independent    Sit to supine: Independent    Scooting: Independent     Transfers Sit to stand: Minimal assist of 1  from bed and chair Sit to stand: Minimal assist of 1 Cues for hand placement and safety     Sit to stand: Independent     Ambulation     5 heel toe steps using  wheeled walker with Minimal assist of 1   for upright and safety, Patient with flexed posture and unsteady gait, no loss of balance, and cues for safety and O2 line management 2 x 15 feet; 2 x 3 feet using  wheeled walker with Minimal assist of 1   cues for WBAT (weight bearing as tolerated) weight bearing right lower extremity, upright posture, safety, pacing, and O2 line management     25 feet using  wheeled walker with Supervision  if wt bearing progressed   Stair negotiation: ascended and descended   Not assessed     3 steps, with rail, Minimal assist of 1   if wt bearing progressed   ROM Within functional limits        Strength BUE:   3+/5  RLE:  4/5  LLE:  4/5  Increase strength in affected mm groups by 1/3 grade   Balance Sitting EOB:  fair    Dynamic Standing:  fair non weight bearing Right lower extremity   Sitting EOB: good -  Dynamic Standing: fair + wheeled walker   Sitting EOB:  good    Dynamic Standing: good with wheeled walker with wt bearing ordereed     Patient is Alert & Oriented x person, place, time, and situation and follows directions    Sensation:  Patient  denies

## 2024-06-17 NOTE — PROGRESS NOTES
Internal Medicine Progress Note     [facility]     Austen Noguera D.O., MIKE Jones D.O., MIKE Francis D.O.     Lupe Reid, MSN, APRN, NP-C  Dann Lin, MSN, APRN-CNP  Kasi De Paz, MSN, APRN, NP-C  Yvonne Epperson, MSN, APRN-CNP  Jacinda Zhu, MSN, APRN, NP-C     Primary Care Physician: Ross Mercer MD   Admitting Physician:  Austen Noguera DO  Admission date and time: 6/13/2024 10:50 PM    Room:  [address] [place] [address]  Admitting diagnosis: Atrial fibrillation with rapid ventricular response (HCC) [I48.91]  Acute respiratory failure with hypoxia and hypercapnia (HCC) [J96.01, J96.02]  Acute on chronic respiratory failure with hypoxia and hypercapnia (HCC) [J96.21, J96.22]    Patient Name: Shauna Gaines  MRN: [id number]    Date of Service: 6/17/2024     Subjective:  Shauna is a 89 y.o. female who was seen and examined today,6/17/2024, at the bedside.  We have reviewed the respiratory film array, chest x-ray and other results with the patient.  Discussed the need to be more active and ambulatory as we wean oxygen.  No additional complaints or concerns. No family present during my examination.    Review of systems:  Constitutional:   + fatigue and malaise , - fever/chills  HEENT:   Denies ear pain, sore throat, sinus or eye problems.  Cardiovascular:   - chest pain, irregular heartbeats, or palpitations.   Respiratory:   + congestion and dyspnea at rest which are improving, - dyspnea on exertion, - coughing, - sputum, - hemoptysis  Gastrointestinal:   - nausea, -vomiting. - diarrhea, - constipation. - poor appetite and poor intake. - abdominal pain.  Genitourinary:    Denies any urgency, frequency, hematuria. Voiding  without difficulty.  Extremities:   Right lower extremity wound is wrapped and dressed.  Otherwise denies lower extremity swelling, edema or cyanosis.   Neurology:    Denies any headache or focal neurological deficits,  podiatric team. Infectious disease. PT, OT and social work for discharge planning as patient now wishes to go home from here. Underlying co-morbidites will be addressed during hospitalization as well. Labs and vital signs will be monitored closely and addressed accordingly. See additional orders for details. Continue current therapy.  See orders for further plan of care.    More than 50% of my  time was spent at the bedside counseling/coordinating care with the patient and/or family with face to face contact.  This time was spent reviewing notes and laboratory data as well as instructing and counseling the patient. Time I spent with the family or surrogate(s) is included only if the patient was incapable of providing the necessary information or participating in medical decisions. I also discussed the differential diagnosis and all of the proposed management plans with the patient and individuals accompanying the patient.    Shauna requires this high level of physician care and nursing on the IMC/Telemetry unit due the complexity of decision management and chance of rapid decline or death.  Continued cardiac monitoring and higher level of nursing are required. I am readily available for any further decision-making and intervention.     AZALEA Song - CNP  6/17/2024  8:19 AM

## 2024-06-17 NOTE — PROGRESS NOTES
Department of Podiatry  Progress Note    SUBJECTIVE:  Patient is seen at bedside for RLE wound. Some pain during vac change today. No acute events overnight. Patient denies any N/V/D/F/C/SOB/CP. No other pedal complaints at this time.     OBJECTIVE:    Scheduled Meds:   predniSONE  40 mg Oral Daily with breakfast    acetylcysteine  4 mL Inhalation Q4H    albuterol  2.5 mg Nebulization Q4H    Vitamin D  2,000 Units Oral Daily    docusate sodium  100 mg Oral Nightly    apixaban  2.5 mg Oral BID    gabapentin  100 mg Oral TID    atorvastatin  10 mg Oral Daily    budesonide  0.5 mg Nebulization BID RT    arformoterol tartrate  15 mcg Nebulization BID RT    linezolid  600 mg Oral 2 times per day    pantoprazole  40 mg Oral QAM AC    metoprolol succinate  50 mg Oral BID    potassium chloride  20 mEq Oral Daily    furosemide  40 mg Oral Daily     Continuous Infusions:  PRN Meds:.magnesium sulfate, potassium chloride **OR** potassium alternative oral replacement **OR** potassium chloride, sodium phosphate 6.69 mmol in sodium chloride 0.9 % 250 mL IVPB **OR** sodium phosphate 13.38 mmol in sodium chloride 0.9 % 250 mL IVPB, HYDROcodone-acetaminophen, perflutren lipid microspheres    Allergies   Allergen Reactions    Adhesive Tape Other (See Comments)     \"Tears Skin\"        BP (!) 123/111   Pulse 78   Temp 98.2 °F (36.8 °C) (Oral)   Resp 18   Ht 1.524 m (5')   SpO2 95%   BMI 18.02 kg/m²       EXAM:     Pt is AAOx3, NAD     Right lower extremity focused exam (6/15/24):     Vascular Exam:  DP and PT pulses are faintly palpable.  CFT is <5 seconds to the distal pari of all the toes.  Skin temperature is warm to warm from the tibial tuberosity down to the dorsum of all the digits with increased warmth surrounding the wound on the anterior aspect of the leg.  Nonpitting edema is Present     Neuro Exam:  Light touch sensation is intact but decreased.     Dermatologic Exam: Skin is intact and well-hydrated.  There is a  full-thickness wound with a granular base noted to the anterior lateral leg.  No bogginess fluctuance crepitance.  No surrounding erythema or edema.  No associated malodor or purulence noted.  No other acute signs of infection.     MSK: Muscle strength 5/5 Right  ROM is full without pain or crepitation bilateral.  Pain on palpation Right leg        Scheduled Meds:   predniSONE  40 mg Oral Daily with breakfast    acetylcysteine  4 mL Inhalation Q4H    albuterol  2.5 mg Nebulization Q4H    Vitamin D  2,000 Units Oral Daily    docusate sodium  100 mg Oral Nightly    apixaban  2.5 mg Oral BID    gabapentin  100 mg Oral TID    atorvastatin  10 mg Oral Daily    budesonide  0.5 mg Nebulization BID RT    arformoterol tartrate  15 mcg Nebulization BID RT    linezolid  600 mg Oral 2 times per day    pantoprazole  40 mg Oral QAM AC    metoprolol succinate  50 mg Oral BID    potassium chloride  20 mEq Oral Daily    furosemide  40 mg Oral Daily     Continuous Infusions:  PRN Meds:.magnesium sulfate, potassium chloride **OR** potassium alternative oral replacement **OR** potassium chloride, sodium phosphate 6.69 mmol in sodium chloride 0.9 % 250 mL IVPB **OR** sodium phosphate 13.38 mmol in sodium chloride 0.9 % 250 mL IVPB, HYDROcodone-acetaminophen, perflutren lipid microspheres    RADIOLOGY:  XR CHEST PORTABLE   Final Result   1. Mild interval increase in the small left pleural effusion when compared to   the previous study performed 06/13/2024.  No significant interval change in   the small right pleural effusion.   2. Chronic interstitial lung changes again noted.         XR CHEST PORTABLE   Final Result   Small bilateral pleural effusions.         Fluoroscopy modified barium swallow with video    (Results Pending)     BP (!) 123/111   Pulse 78   Temp 98.2 °F (36.8 °C) (Oral)   Resp 18   Ht 1.524 m (5')   SpO2 95%   BMI 18.02 kg/m²     LABS:    Recent Labs     06/15/24  0655 06/16/24  0639 06/17/24  0923   WBC 3.7*

## 2024-06-17 NOTE — CARE COORDINATION
6-17-Cm note: [facility] has accepted pt, she will go home with her dtr, orders obtained, wound vac ordered from , pt needs a BSC(ordered) [employer] will deliver to pt's home tomorrow. Cm following. Electronically signed by Yarelis Bell RN on 6/17/2024 at 4:25 PM

## 2024-06-17 NOTE — PROGRESS NOTES
Type Transparent 06/15/24 1200          MEDICATIONS:      predniSONE  40 mg Oral Daily with breakfast    acetylcysteine  4 mL Inhalation Q4H    albuterol  2.5 mg Nebulization Q4H    Vitamin D  2,000 Units Oral Daily    docusate sodium  100 mg Oral Nightly    apixaban  2.5 mg Oral BID    gabapentin  100 mg Oral TID    atorvastatin  10 mg Oral Daily    budesonide  0.5 mg Nebulization BID RT    arformoterol tartrate  15 mcg Nebulization BID RT    linezolid  600 mg Oral 2 times per day    pantoprazole  40 mg Oral QAM AC    metoprolol succinate  50 mg Oral BID    potassium chloride  20 mEq Oral Daily    furosemide  40 mg Oral Daily       magnesium sulfate, potassium chloride **OR** potassium alternative oral replacement **OR** potassium chloride, sodium phosphate 6.69 mmol in sodium chloride 0.9 % 250 mL IVPB **OR** sodium phosphate 13.38 mmol in sodium chloride 0.9 % 250 mL IVPB, HYDROcodone-acetaminophen, perflutren lipid microspheres      LABS:     CBC:   Recent Labs     06/15/24  0655 06/16/24  0639 06/17/24  0923   WBC 3.7* 4.1* 4.9   HGB 10.8* 10.9* 10.5*   * 98*  --        BMP:    Recent Labs     06/15/24  0655 06/16/24  0639 06/17/24  0923    133 133   K 3.9 3.8 3.6   CL 92* 90* 91*   CO2 35* 34* 34*   BUN 12 13 11   CREATININE 0.6 0.6 0.6   GLUCOSE 77 83 132*       Calcium:  Recent Labs     06/17/24  0923   CALCIUM 8.3*          Recent Labs     06/15/24  0655 06/16/24  0639 06/17/24  0923   ALKPHOS 83 85 83   ALT 8 10 8   AST 24 26 21   BILITOT 0.3 0.3 0.4          CULTURES:      Results       Procedure Component Value Units Date/Time    Culture, Respiratory [[id number]]     Order Status: No result Specimen: Sputum Expectorated     Respiratory Panel, Molecular, with COVID-19 (Restricted: peds pts or suitable admitted adults) [[id number]]  (Abnormal) Collected: 06/16/24 1015    Order Status: Completed Specimen: Nasopharyngeal Swab Updated: 06/16/24 1841     Specimen Description .NASOPHARYNGEAL SWAB  Sensitive      vancomycin 1  Sensitive                                   IMAGING:     XR CHEST PORTABLE    Result Date: 6/13/2024  Small bilateral pleural effusions.               Electronically signed by Kelly Briones MD on 6/15/24 at 3:41 PM EDT

## 2024-06-18 VITALS
TEMPERATURE: 98 F | DIASTOLIC BLOOD PRESSURE: 60 MMHG | HEIGHT: 60 IN | WEIGHT: 94.6 LBS | OXYGEN SATURATION: 96 % | SYSTOLIC BLOOD PRESSURE: 127 MMHG | RESPIRATION RATE: 16 BRPM | BODY MASS INDEX: 18.57 KG/M2 | HEART RATE: 83 BPM

## 2024-06-18 LAB
ALBUMIN SERPL-MCNC: 3.4 G/DL (ref 3.5–5.2)
ALP SERPL-CCNC: 85 U/L (ref 35–104)
ALT SERPL-CCNC: 10 U/L (ref 0–32)
ANION GAP SERPL CALCULATED.3IONS-SCNC: 12 MMOL/L (ref 7–16)
AST SERPL-CCNC: 21 U/L (ref 0–31)
BASOPHILS # BLD: 0 K/UL (ref 0–0.2)
BASOPHILS NFR BLD: 0 % (ref 0–2)
BILIRUB SERPL-MCNC: 0.3 MG/DL (ref 0–1.2)
BUN SERPL-MCNC: 13 MG/DL (ref 6–23)
CALCIUM SERPL-MCNC: 9 MG/DL (ref 8.6–10.2)
CHLORIDE SERPL-SCNC: 91 MMOL/L (ref 98–107)
CO2 SERPL-SCNC: 31 MMOL/L (ref 22–29)
CREAT SERPL-MCNC: 0.6 MG/DL (ref 0.5–1)
EOSINOPHIL # BLD: 0 K/UL (ref 0.05–0.5)
EOSINOPHILS RELATIVE PERCENT: 0 % (ref 0–6)
ERYTHROCYTE [DISTWIDTH] IN BLOOD BY AUTOMATED COUNT: 13.1 % (ref 11.5–15)
GFR, ESTIMATED: 86 ML/MIN/1.73M2
GLUCOSE SERPL-MCNC: 121 MG/DL (ref 74–99)
HCT VFR BLD AUTO: 32.8 % (ref 34–48)
HGB BLD-MCNC: 10.4 G/DL (ref 11.5–15.5)
LYMPHOCYTES NFR BLD: 1 K/UL (ref 1.5–4)
LYMPHOCYTES RELATIVE PERCENT: 20 % (ref 20–42)
MAGNESIUM SERPL-MCNC: 2.1 MG/DL (ref 1.6–2.6)
MCH RBC QN AUTO: 29.1 PG (ref 26–35)
MCHC RBC AUTO-ENTMCNC: 31.7 G/DL (ref 32–34.5)
MCV RBC AUTO: 91.9 FL (ref 80–99.9)
MONOCYTES NFR BLD: 0.04 K/UL (ref 0.1–0.95)
MONOCYTES NFR BLD: 1 % (ref 2–12)
NEUTROPHILS NFR BLD: 79 % (ref 43–80)
NEUTS SEG NFR BLD: 3.86 K/UL (ref 1.8–7.3)
PHOSPHATE SERPL-MCNC: 2.4 MG/DL (ref 2.5–4.5)
PLATELET # BLD AUTO: 63 K/UL (ref 130–450)
PLATELET CONFIRMATION: NORMAL
PMV BLD AUTO: 11.1 FL (ref 7–12)
POTASSIUM SERPL-SCNC: 3.8 MMOL/L (ref 3.5–5)
PROT SERPL-MCNC: 6.3 G/DL (ref 6.4–8.3)
RBC # BLD AUTO: 3.57 M/UL (ref 3.5–5.5)
SODIUM SERPL-SCNC: 134 MMOL/L (ref 132–146)
WBC OTHER # BLD: 4.9 K/UL (ref 4.5–11.5)

## 2024-06-18 PROCEDURE — 6360000002 HC RX W HCPCS

## 2024-06-18 PROCEDURE — 84100 ASSAY OF PHOSPHORUS: CPT

## 2024-06-18 PROCEDURE — 6370000000 HC RX 637 (ALT 250 FOR IP): Performed by: NURSE PRACTITIONER

## 2024-06-18 PROCEDURE — 94640 AIRWAY INHALATION TREATMENT: CPT

## 2024-06-18 PROCEDURE — 80053 COMPREHEN METABOLIC PANEL: CPT

## 2024-06-18 PROCEDURE — 6370000000 HC RX 637 (ALT 250 FOR IP)

## 2024-06-18 PROCEDURE — 85025 COMPLETE CBC W/AUTO DIFF WBC: CPT

## 2024-06-18 PROCEDURE — 36415 COLL VENOUS BLD VENIPUNCTURE: CPT

## 2024-06-18 PROCEDURE — 83735 ASSAY OF MAGNESIUM: CPT

## 2024-06-18 PROCEDURE — 6360000002 HC RX W HCPCS: Performed by: INTERNAL MEDICINE

## 2024-06-18 RX ORDER — METOPROLOL SUCCINATE 50 MG/1
50 TABLET, EXTENDED RELEASE ORAL 2 TIMES DAILY
Qty: 60 TABLET | Refills: 0 | Status: SHIPPED | OUTPATIENT
Start: 2024-06-18

## 2024-06-18 RX ORDER — PANTOPRAZOLE SODIUM 40 MG/1
40 TABLET, DELAYED RELEASE ORAL
Qty: 30 TABLET | Refills: 0 | Status: SHIPPED | OUTPATIENT
Start: 2024-06-19

## 2024-06-18 RX ORDER — HYDROCODONE BITARTRATE AND ACETAMINOPHEN 5; 325 MG/1; MG/1
1 TABLET ORAL EVERY 6 HOURS PRN
Qty: 20 TABLET | Refills: 0 | Status: SHIPPED | OUTPATIENT
Start: 2024-06-18 | End: 2024-06-23

## 2024-06-18 RX ORDER — FUROSEMIDE 20 MG/1
40 TABLET ORAL DAILY
Qty: 60 TABLET | Refills: 0 | Status: SHIPPED | OUTPATIENT
Start: 2024-06-18

## 2024-06-18 RX ORDER — PREDNISONE 10 MG/1
TABLET ORAL
Qty: 30 TABLET | Refills: 0 | Status: SHIPPED | OUTPATIENT
Start: 2024-06-18 | End: 2024-07-01

## 2024-06-18 RX ADMIN — ALBUTEROL SULFATE 2.5 MG: 2.5 SOLUTION RESPIRATORY (INHALATION) at 06:37

## 2024-06-18 RX ADMIN — METOPROLOL SUCCINATE 50 MG: 50 TABLET, EXTENDED RELEASE ORAL at 08:35

## 2024-06-18 RX ADMIN — FUROSEMIDE 40 MG: 40 TABLET ORAL at 08:35

## 2024-06-18 RX ADMIN — PANTOPRAZOLE SODIUM 40 MG: 40 TABLET, DELAYED RELEASE ORAL at 06:11

## 2024-06-18 RX ADMIN — ACETYLCYSTEINE 400 MG: 100 INHALANT RESPIRATORY (INHALATION) at 10:00

## 2024-06-18 RX ADMIN — BUDESONIDE INHALATION 500 MCG: 0.5 SUSPENSION RESPIRATORY (INHALATION) at 06:37

## 2024-06-18 RX ADMIN — ALBUTEROL SULFATE 2.5 MG: 2.5 SOLUTION RESPIRATORY (INHALATION) at 10:00

## 2024-06-18 RX ADMIN — GABAPENTIN 100 MG: 100 CAPSULE ORAL at 08:35

## 2024-06-18 RX ADMIN — PREDNISONE 40 MG: 20 TABLET ORAL at 08:36

## 2024-06-18 RX ADMIN — POTASSIUM CHLORIDE 20 MEQ: 1500 TABLET, EXTENDED RELEASE ORAL at 08:35

## 2024-06-18 RX ADMIN — ACETYLCYSTEINE 400 MG: 100 INHALANT RESPIRATORY (INHALATION) at 06:37

## 2024-06-18 RX ADMIN — Medication 2000 UNITS: at 08:36

## 2024-06-18 RX ADMIN — ATORVASTATIN CALCIUM 10 MG: 10 TABLET, FILM COATED ORAL at 08:35

## 2024-06-18 RX ADMIN — GABAPENTIN 100 MG: 100 CAPSULE ORAL at 13:06

## 2024-06-18 RX ADMIN — HYDROCODONE BITARTRATE AND ACETAMINOPHEN 1 TABLET: 5; 325 TABLET ORAL at 12:46

## 2024-06-18 RX ADMIN — APIXABAN 2.5 MG: 2.5 TABLET, FILM COATED ORAL at 08:35

## 2024-06-18 RX ADMIN — ARFORMOTEROL TARTRATE 15 MCG: 15 SOLUTION RESPIRATORY (INHALATION) at 06:37

## 2024-06-18 ASSESSMENT — PAIN SCALES - GENERAL
PAINLEVEL_OUTOF10: 0
PAINLEVEL_OUTOF10: 7

## 2024-06-18 NOTE — PROGRESS NOTES
CLINICAL PHARMACY NOTE: MEDS TO BEDS    Total # of Prescriptions Filled: 5   The following medications were delivered to the patient:  Norco 5/325 mg  Metoprolol succinate er 50 mg  Furosemide 20 mg  Pantoprazole 40 mg  Prednisone 10 mg    Additional Documentation:

## 2024-06-18 NOTE — DISCHARGE SUMMARY
Internal Medicine Progress Note     [facility]     Austen Noguera D.O., MIKE Jones D.O., JODIEOCatarinoICatarino Francis D.O.     Lupe Reid, MSN, APRN, NP-C  Dann Lin, MSN, APRN-CNP  Kasi De Paz, MSN, APRN, NP-C  Yvonne Epperson, MSN, APRN-CNP  Jacinda Zhu, MSN, APRN, NP-C       Internal Medicine  Discharge Summary    NAME: Shauna Gaines  :  1935  MRN:  [id number]  PCP:Ross Mercer MD  ADMITTED: 2024      DISCHARGED: 24    ADMITTING PHYSICIAN: Mj Jones DO    CONSULTANT(S):   IP CONSULT TO INFECTIOUS DISEASES  IP CONSULT TO PODIATRY  IP CONSULT TO SOCIAL WORK     ADMITTING DIAGNOSIS:   Atrial fibrillation with rapid ventricular response (HCC) [I48.91]  Acute respiratory failure with hypoxia and hypercapnia (HCC) [J96.01, J96.02]  Acute on chronic respiratory failure with hypoxia and hypercapnia (HCC) [J96.21, J96.22]  Acute respiratory failure with hypoxia (HCC) [J96.01]     DISCHARGE DIAGNOSES:   Atrial fibrillation with RVR on chronic Eliquis   Recent hospitalization for lower extremity wound infection and cellulitis status post incision and drainage of right leg abscess, excisional wound debridement and application of wound VAC 2024 by Dr. Dexter with cultures indicating pansensitive Enterococcus faecalis on Zyvox  Acute on chronic respiratory failure hypoxia secondary to viral pneumonia and resultant COPD exacerbation  Essential hypertension  Hyperlipidemia  Sick sinus syndrome with pacemaker in situ  Valvular heart disease with mild aortic stenosis, moderate mitral stenosis with severe annular calcification, moderate to severe tricuspid regurgitation, mild pulmonic stenosis, and severe pulm hypertension    BRIEF HISTORY OF PRESENT ILLNESS:   This pleasant 89-year-old white female who was admitted to [facility]. The patient presented to the hospital here on 2024. The patient presented  daily  Qty: 60 tablet, Refills: 2      potassium chloride (KLOR-CON M) 20 MEQ extended release tablet Take 1 tablet by mouth daily  Qty: 30 tablet, Refills: 5      SYMBICORT 80-4.5 MCG/ACT AERO Inhale 2 puffs into the lungs in the morning and 2 puffs in the evening.      simvastatin (ZOCOR) 20 MG tablet Take 1 tablet by mouth daily      Cholecalciferol (VITAMIN D3) 50 MCG (2000 UT) CAPS Take 1 capsule by mouth daily      Multiple Vitamins-Minerals (PROSIGHT) TABS Take 1 tablet by mouth daily      albuterol (PROVENTIL) (2.5 MG/3ML) 0.083% nebulizer solution Take 3 mLs by nebulization in the morning and at bedtime             FOLLOW UP/INSTRUCTIONS:  This patient is instructed to follow-up with her primary care physician.  Patient is instructed to follow-up with the consults listed above as directed by them.  she is instructed to resume home medications and take new medications as indicated in the list above.  If the patient has a recurrence of symptoms, she is instructed to go to the ED.    Preparing for this patient's discharge, including paperwork, orders, instructions, and meeting with patient did require > 40 minutes.    AZALEA Song CNP     6/18/2024  8:28 AM

## 2024-06-18 NOTE — CARE COORDINATION
6-18-Cm note: discharge order noted, [facility] home care will visit tomorrow (orders obtained) [employer] will deliver the wound vac to pt's home in am, pt qualified for 24/7 oxygen (order obtained) , she wears 2l nc at HS only from [facility], placed a call to [facility] , pt has 2 concentrators at home per dtr Luzmaria (1 upstairs,1 down) . Family aware of dc home, they will provide transport. Electronically signed by Yarelis Bell RN on 6/18/2024 at 9:50 AM

## 2024-06-18 NOTE — PROGRESS NOTES
Department of Podiatry  Progress Note    SUBJECTIVE:  Patient is seen at bedside for RLE wound.  She understands plan for going home and having VAC applied at home on discharge with home health care.  Continue with Monday Wednesday Friday VAC changes.. No acute events overnight. Patient denies any N/V/D/F/C/SOB/CP. No other pedal complaints at this time.     OBJECTIVE:    Scheduled Meds:   predniSONE  40 mg Oral Daily with breakfast    acetylcysteine  4 mL Inhalation Q4H    albuterol  2.5 mg Nebulization Q4H    Vitamin D  2,000 Units Oral Daily    docusate sodium  100 mg Oral Nightly    apixaban  2.5 mg Oral BID    gabapentin  100 mg Oral TID    atorvastatin  10 mg Oral Daily    budesonide  0.5 mg Nebulization BID RT    arformoterol tartrate  15 mcg Nebulization BID RT    pantoprazole  40 mg Oral QAM AC    metoprolol succinate  50 mg Oral BID    potassium chloride  20 mEq Oral Daily    furosemide  40 mg Oral Daily     Continuous Infusions:  PRN Meds:.magnesium sulfate, potassium chloride **OR** potassium alternative oral replacement **OR** potassium chloride, sodium phosphate 6.69 mmol in sodium chloride 0.9 % 250 mL IVPB **OR** sodium phosphate 13.38 mmol in sodium chloride 0.9 % 250 mL IVPB, HYDROcodone-acetaminophen, perflutren lipid microspheres    Allergies   Allergen Reactions    Adhesive Tape Other (See Comments)     \"Tears Skin\"        /60   Pulse 83   Temp 98 °F (36.7 °C) (Oral)   Resp 16   Ht 1.524 m (5')   Wt 42.9 kg (94 lb 9.6 oz)   SpO2 96%   BMI 18.48 kg/m²       EXAM:     Pt is AAOx3, NAD     Right lower extremity focused exam (6/15/24):     Vascular Exam:  DP and PT pulses are faintly palpable.  CFT is <5 seconds to the distal pari of all the toes.  Skin temperature is warm to warm from the tibial tuberosity down to the dorsum of all the digits with increased warmth surrounding the wound on the anterior aspect of the leg.  Nonpitting edema is Present     Neuro Exam:  Light touch

## 2024-06-18 NOTE — PROGRESS NOTES
Inpatient wound care note  Wound vac is intact    Inst pt re off loading heels  Gave heel protectors

## 2024-06-18 NOTE — DISCHARGE INSTRUCTIONS
Your information:  Name: Shauna Gaines  : 1935    Your instructions:    You are being discharged home with [facility]. Please follow up with your PCP and take your medications as prescribed.     IF YOU EXPERIENCE ANY OF THE FOLLOWING SYMPTOMS, CHEST PAIN, SHORTNESS OF BREATH, COUGHING UP BLOOD OR BLOODY SPUTUM, STOMACH PAIN OR CRAMPING, DARK, TARRY STOOLS, LOSS OF APPETITE, GENERAL NOT FEELING WELL, SIGNS AND SYMPTOMS OF INFECTION LIKE FEVER AND OR CHILLS, PLEASE CALL DR STEWART OR RETURN TO THE EMERGENCY ROOM.     What to do after you leave the hospital:    Recommended diet: regular diet and low sodium; fluid restriction 2000ml    Recommended activity: activity as tolerated        The following personal items were collected during your admission and were returned to you:    Belongings  Dental Appliances: Uppers  Vision - Corrective Lenses: Eyeglasses  Hearing Aid: Bilateral hearing aids  Clothing: Footwear, Shirt, Pants  Jewelry: None  Electronic Devices:   Weapons (Notify Protective Services/Security): None  Home Medications: None  Valuables Given To: Patient  Provide Name(s) of Who Valuable(s) Were Given To: patient    Information obtained by:  By signing below, I understand that if any problems occur once I leave the hospital I am to contact PCP.  I understand and acknowledge receipt of the instructions indicated above.

## 2024-06-18 NOTE — PROGRESS NOTES
Patient at rest on room air, 02 sat 88%, ambulated patient to commode, 02 sat down to 80% on room air, 02 applied at 2 liters nasal cannula continuous, 02 up to 92% while ambulating.

## 2024-06-20 LAB
EKG ATRIAL RATE: 74 BPM
EKG P AXIS: 50 DEGREES
EKG P-R INTERVAL: 152 MS
EKG Q-T INTERVAL: 392 MS
EKG QRS DURATION: 70 MS
EKG QTC CALCULATION (BAZETT): 435 MS
EKG R AXIS: -22 DEGREES
EKG T AXIS: 49 DEGREES
EKG VENTRICULAR RATE: 74 BPM

## 2024-06-20 NOTE — PROGRESS NOTES
Physician Progress Note      PATIENT:               MARLA THOMPSON  CSN #:                  [id number]  :                       1935  ADMIT DATE:       2024 10:50 PM  DISCH DATE:        2024 1:59 PM  RESPONDING  PROVIDER #:        Austen Heath DO          QUERY TEXT:    Patient admitted with AFIB< Resp. Failure and COPD. Noted documentation of   \"Malnutrition\" on the History Note by Dr. Noguera at the time of admission. In   order to support the diagnosis of Malnutrition, please include additional   clinical indicators in your documentation.  Or please document if the   diagnosis of Malnutrition has been ruled out after further study.    The medical record reflects the following:  Risk Factors: Advanced Age, Multiple Medical Co-morbidities (COPD, Resp.   Failure, Afib, HTN)  Clinical Indicators: Documentation on History document of \"Malnutrition with   BMI of 18.02\" is noted.  There does not appear to be an ASPN scale documented   or Dietary Consult, supporting this.  Treatment:    Yvonne Berger, RN-BSN, CRCR  Clinical   [place], [place]  [email or web address]  Options provided:  -- Malnutrition present as evidenced by, Please document evidence. Please   document severity.  -- Malnutrition was ruled out  -- Other - I will add my own diagnosis  -- Disagree - Not applicable / Not valid  -- Disagree - Clinically unable to determine / Unknown  -- Refer to Clinical Documentation Reviewer    PROVIDER RESPONSE TEXT:    Malnutrition is present as evidenced by BMI    Query created by: Erika Berger on 2024 10:13 AM      Electronically signed by:  Austen Heath DO 2024 4:18 PM

## 2024-06-21 NOTE — PROGRESS NOTES
Physician Progress Note      PATIENT:               MARLA THOMPSON  CSN #:                  [id number]  :                       1935  ADMIT DATE:       2024 10:50 PM  DISCH DATE:        2024 1:59 PM  RESPONDING  PROVIDER #:        Austen Heath DO          QUERY TEXT:    Patient admitted with AFIB, Resp. Failure and COPD. Noted documentation of   \"Malnutrition as noted by BMI\" Dr. Noguera in the previous Query Response for   Malnutrition.In order to support the diagnosis of Malnutrition, please include   additional clinical indicators in your documentation.  Or please document if   the diagnosis of Malnutrition has been ruled out after further study.    The medical record reflects the following:  Risk Factors: Advanced Age, Multiple Medical Co-morbidities (COPD, Resp.   Failure, Afib, HTN)  Clinical Indicators: Documentation on History document of \"Malnutrition with   BMI of 18.02\" is noted.  There does not appear to be an ASPN scale documented   or Dietary Consult, supporting this.  Treatment: Diet    Yvonne Berger, RN-BSN, CRCR  Clinical   [place], [place]  [email or web address]    ASPEN Criteria:    https://aspenjournals.onlinelibrary.mancini.com/doi/full/10.1177/885890664905956  5  Options provided:  -- Mild Malnutrition  -- Moderate Malnutrition  -- Severe Malnutrition  -- Mild Protein calorie malnutrition  -- Moderate Protein calorie malnutrition  -- Severe Protein calorie malnutrition  -- Other - I will add my own diagnosis  -- Disagree - Not applicable / Not valid  -- Disagree - Clinically unable to determine / Unknown  -- Refer to Clinical Documentation Reviewer    PROVIDER RESPONSE TEXT:    This patient has moderate malnutrition.    Query created by: Erika Berger on 2024 4:41 PM      Electronically signed by:  Austen Heath DO 2024 5:18 PM

## 2024-06-23 LAB
MICROORGANISM SPEC CULT: NORMAL
MICROORGANISM/AGENT SPEC: NORMAL
SERVICE CMNT-IMP: NORMAL
SPECIMEN DESCRIPTION: NORMAL

## 2024-06-24 ENCOUNTER — HOSPITAL ENCOUNTER (OUTPATIENT)
Dept: WOUND CARE | Age: 89
Discharge: HOME | End: 2024-06-24
Payer: MEDICARE

## 2024-06-24 VITALS
WEIGHT: 94 LBS | RESPIRATION RATE: 18 BRPM | TEMPERATURE: 96.6 F | HEIGHT: 60 IN | SYSTOLIC BLOOD PRESSURE: 124 MMHG | HEART RATE: 73 BPM | DIASTOLIC BLOOD PRESSURE: 43 MMHG | BODY MASS INDEX: 18.46 KG/M2

## 2024-06-24 DIAGNOSIS — L03.115 CELLULITIS OF RIGHT LEG: Primary | ICD-10-CM

## 2024-06-24 PROCEDURE — 11045 DBRDMT SUBQ TISS EACH ADDL: CPT

## 2024-06-24 PROCEDURE — 11042 DBRDMT SUBQ TIS 1ST 20SQCM/<: CPT

## 2024-06-24 RX ORDER — GENTAMICIN SULFATE 1 MG/G
OINTMENT TOPICAL ONCE
OUTPATIENT
Start: 2024-06-24 | End: 2024-06-24

## 2024-06-24 RX ORDER — IBUPROFEN 200 MG
TABLET ORAL ONCE
OUTPATIENT
Start: 2024-06-24 | End: 2024-06-24

## 2024-06-24 RX ORDER — LIDOCAINE HYDROCHLORIDE 40 MG/ML
SOLUTION TOPICAL ONCE
Status: COMPLETED | OUTPATIENT
Start: 2024-06-24 | End: 2024-06-24

## 2024-06-24 RX ORDER — LIDOCAINE HYDROCHLORIDE 40 MG/ML
SOLUTION TOPICAL ONCE
OUTPATIENT
Start: 2024-06-24 | End: 2024-06-24

## 2024-06-24 RX ORDER — CLOBETASOL PROPIONATE 0.5 MG/G
OINTMENT TOPICAL ONCE
OUTPATIENT
Start: 2024-06-24 | End: 2024-06-24

## 2024-06-24 RX ORDER — TRIAMCINOLONE ACETONIDE 1 MG/G
OINTMENT TOPICAL ONCE
OUTPATIENT
Start: 2024-06-24 | End: 2024-06-24

## 2024-06-24 RX ORDER — GINSENG 100 MG
CAPSULE ORAL ONCE
OUTPATIENT
Start: 2024-06-24 | End: 2024-06-24

## 2024-06-24 RX ORDER — BETAMETHASONE DIPROPIONATE 0.5 MG/G
CREAM TOPICAL ONCE
OUTPATIENT
Start: 2024-06-24 | End: 2024-06-24

## 2024-06-24 RX ORDER — LIDOCAINE HYDROCHLORIDE 20 MG/ML
JELLY TOPICAL ONCE
OUTPATIENT
Start: 2024-06-24 | End: 2024-06-24

## 2024-06-24 RX ORDER — SODIUM CHLOR/HYPOCHLOROUS ACID 0.033 %
SOLUTION, IRRIGATION IRRIGATION ONCE
OUTPATIENT
Start: 2024-06-24 | End: 2024-06-24

## 2024-06-24 RX ORDER — BACITRACIN ZINC AND POLYMYXIN B SULFATE 500; 1000 [USP'U]/G; [USP'U]/G
OINTMENT TOPICAL ONCE
OUTPATIENT
Start: 2024-06-24 | End: 2024-06-24

## 2024-06-24 RX ORDER — LIDOCAINE 40 MG/G
CREAM TOPICAL ONCE
OUTPATIENT
Start: 2024-06-24 | End: 2024-06-24

## 2024-06-24 RX ORDER — LIDOCAINE 50 MG/G
OINTMENT TOPICAL ONCE
OUTPATIENT
Start: 2024-06-24 | End: 2024-06-24

## 2024-06-24 RX ADMIN — LIDOCAINE HYDROCHLORIDE 10 ML: 40 SOLUTION TOPICAL at 09:55

## 2024-06-24 NOTE — PLAN OF CARE
Problem: Pain  Goal: Verbalizes/displays adequate comfort level or baseline comfort level  Outcome: Progressing     Problem: Cognitive:  Goal: Knowledge of wound care  Description: Knowledge of wound care  Outcome: Progressing  Goal: Understands risk factors for wounds  Description: Understands risk factors for wounds  Outcome: Progressing     Problem: Wound:  Goal: Will show signs of wound healing; wound closure and no evidence of infection  Description: Will show signs of wound healing; wound closure and no evidence of infection  Outcome: Progressing     Problem: Venous:  Goal: Signs of wound healing will improve  Description: Signs of wound healing will improve  Outcome: Progressing

## 2024-06-24 NOTE — PROGRESS NOTES
Wound Healing Center  History and Physical/Consultation  Podiatry    Referring Physician : Ross Mercer MD  Shauna Gaines  MEDICAL RECORD NUMBER:  [id number]  AGE: 89 y.o.   GENDER: female  : 1935  EPISODE DATE:  2024  Subjective:     Chief Complaint   Patient presents with    Wound Check     Right pretib         HISTORY of PRESENT ILLNESS HPI     Shauna Gaines is a 89 y.o. female who presents today for wound/ulcer evaluation.   History of Wound Context:  The patient has had a wound of right leg which was first noted approximately >1 month.  This has been treated OR debridement. On their initial visit to the wound healing center, 24 ,  the patient has noted that the wound has been improving.  The patient has had similar previous wounds in the past.      Pt is on abx at time of initial visit.      Wound/Ulcer Pain Timing/Severity: intermittent  Quality of pain: sharp  Severity:  6 / 10   Modifying Factors: Pain worsens with walking  Associated Signs/Symptoms: edema    Ulcer Identification:  Ulcer Type: venous  Contributing Factors: venous stasis and lymphedema    Diabetic/Pressure/Non Pressure Ulcers onl y:  Ulcer: Non-Pressure ulcer, fat layer exposed    If patient has diabetic lower extremity wounds  Pulido Classification of diabetic lower extremity wounds:    Grade Description   []  0 No open wound   [x]  1 Superficial ulcer involving the full skin thickness   []  2 Deep ulcer involves ligament, tendon, joint capsule, or fascia  No bone involvement or abscess presence   []  3 Deep Ulcer with abcess formation and/or osteomyelitis   []  4 Localized gangrene   []  5 Extensive gangrene of the foot     Wound: Open bite        PAST MEDICAL HISTORY      Diagnosis Date    Aortic valve stenosis     Asthma     COPD (chronic obstructive pulmonary disease) ([facility])     COVID 2022    Hearing aid worn     Heart murmur     Hyperlipidemia     Hypertension      Past Surgical History:   Procedure Laterality

## 2024-06-24 NOTE — DISCHARGE INSTRUCTIONS
Visit Discharge/Physician Orders     Discharge condition: Stable     Assessment of pain at discharge: moderate     Anesthetic used: 4% lidocaine     Discharge to: Home     Left via:Private automobile     Accompanied by: family     ECF/HHA: [facility]     Dressing Orders: Right leg: cleanse wound with normal saline, apply alginate to wound and cover with ABD pad and wrap with kerlix. Change daily.    Spandgrip on Right legs- On in morning and off at night      Hold wound vac until 6/26 (wed) then continue wound vac as ordered.      Treatment Orders:     [facility] followup visit _____1 week_______________________  (Please note your next appointment above and if you are unable to keep, kindly give a 24 hour notice. Thank you.)     Physician signature:__________________________        If you experience any of the following, please call the Wound Care Center during business hours:     * Increase in Pain  * Temperature over 101  * Increase in drainage from your wound  * Drainage with a foul odor  * Bleeding  * Increase in swelling  * Need for compression bandage changes due to slippage, breakthrough drainage.     If you need medical attention outside of the business hours of the Wound Care Centers please contact your PCP or go to the nearest emergency room.

## 2024-06-25 NOTE — DISCHARGE INSTRUCTIONS
Visit Discharge/Physician Orders     Discharge condition: Stable     Assessment of pain at discharge: moderate     Anesthetic used: 4% lidocaine     Discharge to: Home     Left via:Private automobile     Accompanied by: family     ECF/HHA: [facility]     Dressing Orders: Right leg: cleanse wound with normal saline, apply alginate AG to wound and cover with ABD pad and wrap with kerlix. Change daily.     Spandgrip on Right legs- On in morning and off at night       Hold wound vac until 7/8 (mon)/ next appointment     Treatment Orders:     [facility] followup visit _____1 week_______________________  (Please note your next appointment above and if you are unable to keep, kindly give a 24 hour notice. Thank you.)     Physician signature:__________________________        If you experience any of the following, please call the Wound Care Center during business hours:     * Increase in Pain  * Temperature over 101  * Increase in drainage from your wound  * Drainage with a foul odor  * Bleeding  * Increase in swelling  * Need for compression bandage changes due to slippage, breakthrough drainage.     If you need medical attention outside of the business hours of the Wound Care Centers please contact your PCP or go to the nearest emergency room.

## 2024-06-25 NOTE — TELEPHONE ENCOUNTER
May take metoprolol 50 mg twice a day but record her blood pressure to make sure she is not becoming hypotensive.

## 2024-06-27 ENCOUNTER — TELEPHONE (OUTPATIENT)
Dept: CARDIOLOGY CLINIC | Age: 89
End: 2024-06-27

## 2024-07-01 ENCOUNTER — HOSPITAL ENCOUNTER (OUTPATIENT)
Dept: WOUND CARE | Age: 89
Discharge: HOME | End: 2024-07-01
Payer: MEDICARE

## 2024-07-01 VITALS
HEART RATE: 93 BPM | RESPIRATION RATE: 18 BRPM | BODY MASS INDEX: 18.46 KG/M2 | WEIGHT: 94 LBS | TEMPERATURE: 97.2 F | HEIGHT: 60 IN | DIASTOLIC BLOOD PRESSURE: 72 MMHG | SYSTOLIC BLOOD PRESSURE: 150 MMHG

## 2024-07-01 DIAGNOSIS — L03.115 CELLULITIS OF RIGHT LEG: Primary | ICD-10-CM

## 2024-07-01 PROCEDURE — 11045 DBRDMT SUBQ TISS EACH ADDL: CPT

## 2024-07-01 PROCEDURE — 11042 DBRDMT SUBQ TIS 1ST 20SQCM/<: CPT

## 2024-07-01 RX ORDER — LIDOCAINE HYDROCHLORIDE 40 MG/ML
SOLUTION TOPICAL ONCE
Status: COMPLETED | OUTPATIENT
Start: 2024-07-01 | End: 2024-07-01

## 2024-07-01 RX ORDER — BETAMETHASONE DIPROPIONATE 0.5 MG/G
CREAM TOPICAL ONCE
OUTPATIENT
Start: 2024-07-01 | End: 2024-07-01

## 2024-07-01 RX ORDER — LIDOCAINE HYDROCHLORIDE 40 MG/ML
SOLUTION TOPICAL ONCE
OUTPATIENT
Start: 2024-07-01 | End: 2024-07-01

## 2024-07-01 RX ORDER — LIDOCAINE HYDROCHLORIDE 20 MG/ML
JELLY TOPICAL ONCE
OUTPATIENT
Start: 2024-07-01 | End: 2024-07-01

## 2024-07-01 RX ORDER — LIDOCAINE 40 MG/G
CREAM TOPICAL ONCE
OUTPATIENT
Start: 2024-07-01 | End: 2024-07-01

## 2024-07-01 RX ORDER — CLOBETASOL PROPIONATE 0.5 MG/G
OINTMENT TOPICAL ONCE
OUTPATIENT
Start: 2024-07-01 | End: 2024-07-01

## 2024-07-01 RX ORDER — GINSENG 100 MG
CAPSULE ORAL ONCE
OUTPATIENT
Start: 2024-07-01 | End: 2024-07-01

## 2024-07-01 RX ORDER — IBUPROFEN 200 MG
TABLET ORAL ONCE
OUTPATIENT
Start: 2024-07-01 | End: 2024-07-01

## 2024-07-01 RX ORDER — SODIUM CHLOR/HYPOCHLOROUS ACID 0.033 %
SOLUTION, IRRIGATION IRRIGATION ONCE
OUTPATIENT
Start: 2024-07-01 | End: 2024-07-01

## 2024-07-01 RX ORDER — GENTAMICIN SULFATE 1 MG/G
OINTMENT TOPICAL ONCE
OUTPATIENT
Start: 2024-07-01 | End: 2024-07-01

## 2024-07-01 RX ORDER — TRIAMCINOLONE ACETONIDE 1 MG/G
OINTMENT TOPICAL ONCE
OUTPATIENT
Start: 2024-07-01 | End: 2024-07-01

## 2024-07-01 RX ORDER — LIDOCAINE 50 MG/G
OINTMENT TOPICAL ONCE
OUTPATIENT
Start: 2024-07-01 | End: 2024-07-01

## 2024-07-01 RX ORDER — BACITRACIN ZINC AND POLYMYXIN B SULFATE 500; 1000 [USP'U]/G; [USP'U]/G
OINTMENT TOPICAL ONCE
OUTPATIENT
Start: 2024-07-01 | End: 2024-07-01

## 2024-07-01 RX ADMIN — LIDOCAINE HYDROCHLORIDE 5 ML: 40 SOLUTION TOPICAL at 11:29

## 2024-07-01 ASSESSMENT — PAIN DESCRIPTION - DESCRIPTORS: DESCRIPTORS: ACHING;SHARP

## 2024-07-01 ASSESSMENT — PAIN DESCRIPTION - ORIENTATION: ORIENTATION: RIGHT

## 2024-07-01 ASSESSMENT — PAIN DESCRIPTION - FREQUENCY: FREQUENCY: CONTINUOUS

## 2024-07-01 ASSESSMENT — PAIN DESCRIPTION - PAIN TYPE: TYPE: CHRONIC PAIN

## 2024-07-01 ASSESSMENT — PAIN - FUNCTIONAL ASSESSMENT: PAIN_FUNCTIONAL_ASSESSMENT: PREVENTS OR INTERFERES SOME ACTIVE ACTIVITIES AND ADLS

## 2024-07-01 ASSESSMENT — PAIN DESCRIPTION - LOCATION: LOCATION: LEG

## 2024-07-01 ASSESSMENT — PAIN SCALES - GENERAL: PAINLEVEL_OUTOF10: 5

## 2024-07-02 NOTE — DISCHARGE INSTRUCTIONS
Visit Discharge/Physician Orders     Discharge condition: Stable     Assessment of pain at discharge: moderate     Anesthetic used: 4% lidocaine     Discharge to: Home     Left via:Private automobile     Accompanied by: family     ECF/HHA: [facility]     Dressing Orders: Right leg: cleanse wound with normal saline, apply alginate AG to wound and cover with ABD pad and wrap with kerlix. Change daily.     Spandgrip on Right legs- On in morning and off at night      Cleanse wound to left chest with normal saline, apply ALGINATE AG to wound bed and cover with ABD pad and- adhere with tape, change daily.     Treatment Orders: discontinue wound vac 7/8  Culture taken of chest 7/8  Doxycycline sent to pharmacy- please  and take as ordered    C followup visit _____1 week dr dunn, 2 weeks glenda_______________________  (Please note your next appointment above and if you are unable to keep, kindly give a 24 hour notice. Thank you.)     Physician signature:__________________________        If you experience any of the following, please call the Wound Care Center during business hours:     * Increase in Pain  * Temperature over 101  * Increase in drainage from your wound  * Drainage with a foul odor  * Bleeding  * Increase in swelling  * Need for compression bandage changes due to slippage, breakthrough drainage.     If you need medical attention outside of the business hours of the Wound Care Centers please contact your PCP or go to the nearest emergency room.

## 2024-07-08 ENCOUNTER — HOSPITAL ENCOUNTER (OUTPATIENT)
Dept: WOUND CARE | Age: 89
Discharge: HOME | End: 2024-07-08
Payer: MEDICARE

## 2024-07-08 ENCOUNTER — TELEPHONE (OUTPATIENT)
Dept: ADMINISTRATIVE | Age: 89
End: 2024-07-08

## 2024-07-08 VITALS
TEMPERATURE: 97.9 F | HEART RATE: 74 BPM | DIASTOLIC BLOOD PRESSURE: 58 MMHG | SYSTOLIC BLOOD PRESSURE: 139 MMHG | RESPIRATION RATE: 18 BRPM

## 2024-07-08 DIAGNOSIS — L02.213 CHEST WALL ABSCESS: Primary | ICD-10-CM

## 2024-07-08 PROBLEM — L97.912 CHRONIC ULCER OF RIGHT LEG WITH FAT LAYER EXPOSED (HCC): Status: ACTIVE | Noted: 2024-07-08

## 2024-07-08 PROCEDURE — 87070 CULTURE OTHR SPECIMN AEROBIC: CPT

## 2024-07-08 PROCEDURE — 87205 SMEAR GRAM STAIN: CPT

## 2024-07-08 PROCEDURE — 11042 DBRDMT SUBQ TIS 1ST 20SQCM/<: CPT

## 2024-07-08 PROCEDURE — 11045 DBRDMT SUBQ TISS EACH ADDL: CPT

## 2024-07-08 PROCEDURE — 87077 CULTURE AEROBIC IDENTIFY: CPT

## 2024-07-08 RX ORDER — BACITRACIN ZINC AND POLYMYXIN B SULFATE 500; 1000 [USP'U]/G; [USP'U]/G
OINTMENT TOPICAL ONCE
OUTPATIENT
Start: 2024-07-08 | End: 2024-07-08

## 2024-07-08 RX ORDER — GINSENG 100 MG
CAPSULE ORAL ONCE
OUTPATIENT
Start: 2024-07-08 | End: 2024-07-08

## 2024-07-08 RX ORDER — GENTAMICIN SULFATE 1 MG/G
OINTMENT TOPICAL ONCE
OUTPATIENT
Start: 2024-07-08 | End: 2024-07-08

## 2024-07-08 RX ORDER — SODIUM CHLOR/HYPOCHLOROUS ACID 0.033 %
SOLUTION, IRRIGATION IRRIGATION ONCE
OUTPATIENT
Start: 2024-07-08 | End: 2024-07-08

## 2024-07-08 RX ORDER — LIDOCAINE 50 MG/G
OINTMENT TOPICAL ONCE
OUTPATIENT
Start: 2024-07-08 | End: 2024-07-08

## 2024-07-08 RX ORDER — DOXYCYCLINE HYCLATE 100 MG
100 TABLET ORAL 2 TIMES DAILY
Qty: 20 TABLET | Refills: 0 | Status: ON HOLD | OUTPATIENT
Start: 2024-07-08 | End: 2024-07-14 | Stop reason: HOSPADM

## 2024-07-08 RX ORDER — TRIAMCINOLONE ACETONIDE 1 MG/G
OINTMENT TOPICAL ONCE
OUTPATIENT
Start: 2024-07-08 | End: 2024-07-08

## 2024-07-08 RX ORDER — CLOBETASOL PROPIONATE 0.5 MG/G
OINTMENT TOPICAL ONCE
OUTPATIENT
Start: 2024-07-08 | End: 2024-07-08

## 2024-07-08 RX ORDER — IBUPROFEN 200 MG
TABLET ORAL ONCE
OUTPATIENT
Start: 2024-07-08 | End: 2024-07-08

## 2024-07-08 RX ORDER — LIDOCAINE HYDROCHLORIDE 40 MG/ML
SOLUTION TOPICAL ONCE
OUTPATIENT
Start: 2024-07-08 | End: 2024-07-08

## 2024-07-08 RX ORDER — BETAMETHASONE DIPROPIONATE 0.5 MG/G
CREAM TOPICAL ONCE
OUTPATIENT
Start: 2024-07-08 | End: 2024-07-08

## 2024-07-08 RX ORDER — LIDOCAINE HYDROCHLORIDE 20 MG/ML
JELLY TOPICAL ONCE
OUTPATIENT
Start: 2024-07-08 | End: 2024-07-08

## 2024-07-08 RX ORDER — LIDOCAINE 40 MG/G
CREAM TOPICAL ONCE
OUTPATIENT
Start: 2024-07-08 | End: 2024-07-08

## 2024-07-08 NOTE — TELEPHONE ENCOUNTER
Pt was seen by the wound care center today at [facility] and advised to call Dr. Tripathi's office.  Pt has a wound at the pacemaker site that is infected.  It is being treated with an antibiotic (Doxycycline) but was told she should be seen by cardiology to look at the site and to let them know if that is how they want to proceed with treatment.  Staff unavailable due to pt care.  Please contact pt's daughter.

## 2024-07-09 ENCOUNTER — APPOINTMENT (OUTPATIENT)
Dept: CT IMAGING | Age: 89
DRG: 260 | End: 2024-07-09
Payer: MEDICARE

## 2024-07-09 ENCOUNTER — HOSPITAL ENCOUNTER (INPATIENT)
Age: 89
LOS: 6 days | Discharge: HOME HEALTH | DRG: 260 | End: 2024-07-15
Attending: STUDENT IN AN ORGANIZED HEALTH CARE EDUCATION/TRAINING PROGRAM | Admitting: STUDENT IN AN ORGANIZED HEALTH CARE EDUCATION/TRAINING PROGRAM
Payer: MEDICARE

## 2024-07-09 DIAGNOSIS — T82.7XXA PACEMAKER INFECTION, INITIAL ENCOUNTER (HCC): ICD-10-CM

## 2024-07-09 DIAGNOSIS — T81.31XA DEHISCENCE OF OPERATIVE WOUND, INITIAL ENCOUNTER: Primary | ICD-10-CM

## 2024-07-09 DIAGNOSIS — I48.0 PAROXYSMAL ATRIAL FIBRILLATION (HCC): ICD-10-CM

## 2024-07-09 DIAGNOSIS — I51.9 HEART DISEASE, UNSPECIFIED: ICD-10-CM

## 2024-07-09 LAB
ALBUMIN SERPL-MCNC: 3.6 G/DL (ref 3.5–5.2)
ALP SERPL-CCNC: 122 U/L (ref 35–104)
ALT SERPL-CCNC: 12 U/L (ref 0–32)
ANION GAP SERPL CALCULATED.3IONS-SCNC: 10 MMOL/L (ref 7–16)
ANION GAP SERPL CALCULATED.3IONS-SCNC: 13 MMOL/L (ref 7–16)
AST SERPL-CCNC: 31 U/L (ref 0–31)
BASOPHILS # BLD: 0.04 K/UL (ref 0–0.2)
BASOPHILS NFR BLD: 0 % (ref 0–2)
BILIRUB SERPL-MCNC: 0.2 MG/DL (ref 0–1.2)
BUN SERPL-MCNC: 27 MG/DL (ref 6–23)
BUN SERPL-MCNC: 31 MG/DL (ref 6–23)
CALCIUM SERPL-MCNC: 8.8 MG/DL (ref 8.6–10.2)
CALCIUM SERPL-MCNC: 9.2 MG/DL (ref 8.6–10.2)
CHLORIDE SERPL-SCNC: 101 MMOL/L (ref 98–107)
CHLORIDE SERPL-SCNC: 103 MMOL/L (ref 98–107)
CO2 SERPL-SCNC: 27 MMOL/L (ref 22–29)
CO2 SERPL-SCNC: 28 MMOL/L (ref 22–29)
CREAT SERPL-MCNC: 0.8 MG/DL (ref 0.5–1)
CREAT SERPL-MCNC: 1 MG/DL (ref 0.5–1)
EOSINOPHIL # BLD: 0.18 K/UL (ref 0.05–0.5)
EOSINOPHILS RELATIVE PERCENT: 2 % (ref 0–6)
ERYTHROCYTE [DISTWIDTH] IN BLOOD BY AUTOMATED COUNT: 15.3 % (ref 11.5–15)
GFR, ESTIMATED: 57 ML/MIN/1.73M2
GFR, ESTIMATED: 67 ML/MIN/1.73M2
GLUCOSE SERPL-MCNC: 173 MG/DL (ref 74–99)
GLUCOSE SERPL-MCNC: 99 MG/DL (ref 74–99)
HCT VFR BLD AUTO: 37.2 % (ref 34–48)
HGB BLD-MCNC: 11.5 G/DL (ref 11.5–15.5)
IMM GRANULOCYTES # BLD AUTO: 0.04 K/UL (ref 0–0.58)
IMM GRANULOCYTES NFR BLD: 0 % (ref 0–5)
LYMPHOCYTES NFR BLD: 2.18 K/UL (ref 1.5–4)
LYMPHOCYTES RELATIVE PERCENT: 21 % (ref 20–42)
MCH RBC QN AUTO: 28.5 PG (ref 26–35)
MCHC RBC AUTO-ENTMCNC: 30.9 G/DL (ref 32–34.5)
MCV RBC AUTO: 92.1 FL (ref 80–99.9)
MONOCYTES NFR BLD: 1.03 K/UL (ref 0.1–0.95)
MONOCYTES NFR BLD: 10 % (ref 2–12)
NEUTROPHILS NFR BLD: 67 % (ref 43–80)
NEUTS SEG NFR BLD: 6.9 K/UL (ref 1.8–7.3)
PLATELET # BLD AUTO: 236 K/UL (ref 130–450)
PMV BLD AUTO: 10.2 FL (ref 7–12)
POTASSIUM SERPL-SCNC: 4.1 MMOL/L (ref 3.5–5)
POTASSIUM SERPL-SCNC: 5.4 MMOL/L (ref 3.5–5)
PROT SERPL-MCNC: 7.3 G/DL (ref 6.4–8.3)
RBC # BLD AUTO: 4.04 M/UL (ref 3.5–5.5)
SODIUM SERPL-SCNC: 141 MMOL/L (ref 132–146)
SODIUM SERPL-SCNC: 141 MMOL/L (ref 132–146)
WBC OTHER # BLD: 10.4 K/UL (ref 4.5–11.5)

## 2024-07-09 PROCEDURE — 87040 BLOOD CULTURE FOR BACTERIA: CPT

## 2024-07-09 PROCEDURE — 87077 CULTURE AEROBIC IDENTIFY: CPT

## 2024-07-09 PROCEDURE — 80053 COMPREHEN METABOLIC PANEL: CPT

## 2024-07-09 PROCEDURE — 94640 AIRWAY INHALATION TREATMENT: CPT

## 2024-07-09 PROCEDURE — 99222 1ST HOSP IP/OBS MODERATE 55: CPT | Performed by: STUDENT IN AN ORGANIZED HEALTH CARE EDUCATION/TRAINING PROGRAM

## 2024-07-09 PROCEDURE — 87205 SMEAR GRAM STAIN: CPT

## 2024-07-09 PROCEDURE — 99285 EMERGENCY DEPT VISIT HI MDM: CPT

## 2024-07-09 PROCEDURE — 87070 CULTURE OTHR SPECIMN AEROBIC: CPT

## 2024-07-09 PROCEDURE — 6370000000 HC RX 637 (ALT 250 FOR IP): Performed by: STUDENT IN AN ORGANIZED HEALTH CARE EDUCATION/TRAINING PROGRAM

## 2024-07-09 PROCEDURE — 2580000003 HC RX 258: Performed by: NURSE PRACTITIONER

## 2024-07-09 PROCEDURE — 6370000000 HC RX 637 (ALT 250 FOR IP): Performed by: NURSE PRACTITIONER

## 2024-07-09 PROCEDURE — 80048 BASIC METABOLIC PNL TOTAL CA: CPT

## 2024-07-09 PROCEDURE — 6360000002 HC RX W HCPCS: Performed by: STUDENT IN AN ORGANIZED HEALTH CARE EDUCATION/TRAINING PROGRAM

## 2024-07-09 PROCEDURE — 71260 CT THORAX DX C+: CPT

## 2024-07-09 PROCEDURE — 2060000000 HC ICU INTERMEDIATE R&B

## 2024-07-09 PROCEDURE — 85025 COMPLETE CBC W/AUTO DIFF WBC: CPT

## 2024-07-09 PROCEDURE — 6360000002 HC RX W HCPCS: Performed by: NURSE PRACTITIONER

## 2024-07-09 PROCEDURE — 2580000003 HC RX 258: Performed by: STUDENT IN AN ORGANIZED HEALTH CARE EDUCATION/TRAINING PROGRAM

## 2024-07-09 RX ORDER — METOPROLOL SUCCINATE 50 MG/1
50 TABLET, EXTENDED RELEASE ORAL 2 TIMES DAILY
Status: DISCONTINUED | OUTPATIENT
Start: 2024-07-09 | End: 2024-07-12

## 2024-07-09 RX ORDER — SODIUM CHLORIDE 0.9 % (FLUSH) 0.9 %
5-40 SYRINGE (ML) INJECTION EVERY 12 HOURS SCHEDULED
Status: DISCONTINUED | OUTPATIENT
Start: 2024-07-09 | End: 2024-07-15 | Stop reason: HOSPADM

## 2024-07-09 RX ORDER — SODIUM CHLORIDE 0.9 % (FLUSH) 0.9 %
5-40 SYRINGE (ML) INJECTION PRN
Status: DISCONTINUED | OUTPATIENT
Start: 2024-07-09 | End: 2024-07-15 | Stop reason: HOSPADM

## 2024-07-09 RX ORDER — ONDANSETRON 4 MG/1
4 TABLET, ORALLY DISINTEGRATING ORAL EVERY 8 HOURS PRN
Status: DISCONTINUED | OUTPATIENT
Start: 2024-07-09 | End: 2024-07-15 | Stop reason: HOSPADM

## 2024-07-09 RX ORDER — HYDROCODONE BITARTRATE AND ACETAMINOPHEN 5; 325 MG/1; MG/1
1 TABLET ORAL ONCE
Status: COMPLETED | OUTPATIENT
Start: 2024-07-09 | End: 2024-07-09

## 2024-07-09 RX ORDER — POLYETHYLENE GLYCOL 3350 17 G/17G
17 POWDER, FOR SOLUTION ORAL DAILY PRN
Status: DISCONTINUED | OUTPATIENT
Start: 2024-07-09 | End: 2024-07-15 | Stop reason: HOSPADM

## 2024-07-09 RX ORDER — ACETAMINOPHEN 325 MG/1
650 TABLET ORAL EVERY 6 HOURS PRN
Status: DISCONTINUED | OUTPATIENT
Start: 2024-07-09 | End: 2024-07-15 | Stop reason: HOSPADM

## 2024-07-09 RX ORDER — ACETAMINOPHEN 650 MG/1
650 SUPPOSITORY RECTAL EVERY 6 HOURS PRN
Status: DISCONTINUED | OUTPATIENT
Start: 2024-07-09 | End: 2024-07-15 | Stop reason: HOSPADM

## 2024-07-09 RX ORDER — SODIUM CHLORIDE 9 MG/ML
INJECTION, SOLUTION INTRAVENOUS CONTINUOUS
Status: ACTIVE | OUTPATIENT
Start: 2024-07-09 | End: 2024-07-10

## 2024-07-09 RX ORDER — ATORVASTATIN CALCIUM 20 MG/1
20 TABLET, FILM COATED ORAL DAILY
Status: DISCONTINUED | OUTPATIENT
Start: 2024-07-10 | End: 2024-07-15 | Stop reason: HOSPADM

## 2024-07-09 RX ORDER — SODIUM CHLORIDE 9 MG/ML
INJECTION, SOLUTION INTRAVENOUS PRN
Status: DISCONTINUED | OUTPATIENT
Start: 2024-07-09 | End: 2024-07-15 | Stop reason: HOSPADM

## 2024-07-09 RX ORDER — ONDANSETRON 2 MG/ML
4 INJECTION INTRAMUSCULAR; INTRAVENOUS EVERY 6 HOURS PRN
Status: DISCONTINUED | OUTPATIENT
Start: 2024-07-09 | End: 2024-07-15 | Stop reason: HOSPADM

## 2024-07-09 RX ORDER — DOCUSATE SODIUM 100 MG/1
100 CAPSULE, LIQUID FILLED ORAL NIGHTLY
Status: DISCONTINUED | OUTPATIENT
Start: 2024-07-09 | End: 2024-07-15 | Stop reason: HOSPADM

## 2024-07-09 RX ORDER — HEPARIN SODIUM 5000 [USP'U]/ML
5000 INJECTION, SOLUTION INTRAVENOUS; SUBCUTANEOUS 2 TIMES DAILY
Status: DISCONTINUED | OUTPATIENT
Start: 2024-07-09 | End: 2024-07-09

## 2024-07-09 RX ORDER — HYDROCODONE BITARTRATE AND ACETAMINOPHEN 5; 325 MG/1; MG/1
1 TABLET ORAL EVERY 6 HOURS PRN
Status: DISCONTINUED | OUTPATIENT
Start: 2024-07-09 | End: 2024-07-09

## 2024-07-09 RX ORDER — ALBUTEROL SULFATE 2.5 MG/3ML
2.5 SOLUTION RESPIRATORY (INHALATION)
Status: DISCONTINUED | OUTPATIENT
Start: 2024-07-09 | End: 2024-07-14

## 2024-07-09 RX ORDER — PANTOPRAZOLE SODIUM 40 MG/1
40 TABLET, DELAYED RELEASE ORAL
Status: DISCONTINUED | OUTPATIENT
Start: 2024-07-10 | End: 2024-07-15 | Stop reason: HOSPADM

## 2024-07-09 RX ADMIN — METOPROLOL SUCCINATE 50 MG: 25 TABLET, EXTENDED RELEASE ORAL at 22:45

## 2024-07-09 RX ADMIN — ALBUTEROL SULFATE 2.5 MG: 2.5 SOLUTION RESPIRATORY (INHALATION) at 21:42

## 2024-07-09 RX ADMIN — WATER 1000 MG: 1 INJECTION INTRAMUSCULAR; INTRAVENOUS; SUBCUTANEOUS at 22:48

## 2024-07-09 RX ADMIN — HYDROCODONE BITARTRATE AND ACETAMINOPHEN 1 TABLET: 5; 325 TABLET ORAL at 22:45

## 2024-07-09 RX ADMIN — SODIUM CHLORIDE, PRESERVATIVE FREE 10 ML: 5 INJECTION INTRAVENOUS at 22:46

## 2024-07-09 RX ADMIN — VANCOMYCIN HYDROCHLORIDE 500 MG: 500 INJECTION, POWDER, LYOPHILIZED, FOR SOLUTION INTRAVENOUS at 22:55

## 2024-07-09 ASSESSMENT — PAIN SCALES - GENERAL
PAINLEVEL_OUTOF10: 2
PAINLEVEL_OUTOF10: 5

## 2024-07-09 NOTE — ED PROVIDER NOTES
their note for interpretation of EKG.    RADIOLOGY:   Non-plain film images such as CT, Ultrasound and MRI are read by the radiologist. Plain radiographic images are visualized and preliminarily interpreted by the ED Provider with the below findings:        Interpretation per the Radiologist below, if available at the time of this note:    CT CHEST W CONTRAST    (Results Pending)     No results found.    No results found.    PROCEDURES   Unless otherwise noted below, none     Procedures    CRITICAL CARE TIME (.cctime)       PAST MEDICAL HISTORY      has a past medical history of Aortic valve stenosis, Asthma, COPD (chronic obstructive pulmonary disease) ([facility]), COVID (12/2022), Hearing aid worn, Heart murmur, Hyperlipidemia, and Hypertension.     Chronic Conditions affecting Care:     EMERGENCY DEPARTMENT COURSE and DIFFERENTIAL DIAGNOSIS/MDM:   Vitals:    Vitals:    07/09/24 1508 07/09/24 1545 07/09/24 1547   BP:  (!) 136/52    Pulse: 62     Resp: 16 18    Temp: 99 °F (37.2 °C)     TempSrc: Oral     SpO2: 92%     Weight:   38.6 kg (85 lb)   Height:   1.524 m (5')       Patient was given the following medications:  Medications   ceFAZolin (ANCEF) 1,000 mg in sterile water 10 mL IV syringe (has no administration in time range)   vancomycin (VANCOCIN) 500 mg in sodium chloride 0.9 % 100 mL IVPB (has no administration in time range)   sodium chloride flush 0.9 % injection 5-40 mL (has no administration in time range)   sodium chloride flush 0.9 % injection 5-40 mL (has no administration in time range)   0.9 % sodium chloride infusion (has no administration in time range)   ondansetron (ZOFRAN-ODT) disintegrating tablet 4 mg (has no administration in time range)     Or   ondansetron (ZOFRAN) injection 4 mg (has no administration in time range)   polyethylene glycol (GLYCOLAX) packet 17 g (has no administration in time range)   acetaminophen (TYLENOL) tablet 650 mg (has no administration in time range)     Or

## 2024-07-09 NOTE — ED NOTES
Department of Emergency Medicine  FIRST PROVIDER TRIAGE NOTE             Independent MLP           7/9/24  3:47 PM EDT    Date of Encounter: 7/9/24   MRN: [id number]      HPI: Shauna Gaines is a 89 y.o. female who presents to the ED for Wound Check (Pacemaker put in march, drainage around site. Wound on right shin that is healing.) and Wound Infection  Purulent drainage coming from wound on pacemaker.  Pacemaker inserted in March by Dr. Tripathi.  Started on doxycycline yesterday.    ROS: Negative for cp, sob, or fever.    PE: Gen Appearance/Constitutional: alert  Musculoskeletal: moves all extremities x 4     Initial Plan of Care: All treatment areas with department are currently occupied. Plan to order/Initiate the following while awaiting opening in ED:  Initiate Treatment-Testing, Proceed toTreatment Area When Bed Available for ED Attending/MLP to Continue Care    Electronically signed by AZALEA Quezada NP   DD: 7/9/24      Jose Zambrano APRN - NP  07/09/24 1549

## 2024-07-09 NOTE — H&P
Hospitalist History & Physical      PCP: Ross Mercer MD    Date of Admission: 7/9/2024    Date of Service: Pt seen/examined on 7/9/2024 and is admitted to Inpatient with expected LOS greater than two midnights due to medical therapy.      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  PPM was implanted on March 2024 at [facility].  Patient's daughter states it all started with a scratch on the skin caused by the bra. Over the weekend it turn red and then pus was extracted from it yesterday when she went to wound care clinic. She was prescribed doxycycline. Has been having chills and sleeping more for past couple days and localized pain. Denies fevers,other chest pain, palpitations, abdominal pain, nausea, vomiting, shortness of breath. Does have a chronic cough.      Upon ER evaluation temperature 99 F /52 HR 62 RR 16 SpO2 92% in room air.  Labs CBC pending, potassium 5.4, BUN 31, creatinine 0.8.  Blood culture and wound culture sent CT scan of the chest is pending.  Patient was given Ancef and vancomycin.  [facility] was called for admission.  Case was discussed with ER provider        Past Medical History:   Diagnosis Date    Aortic valve stenosis     Asthma     COPD (chronic obstructive pulmonary disease) ([facility])     COVID 12/2022    Hearing aid worn     Heart murmur     Hyperlipidemia     Hypertension        Past Surgical History:   Procedure Laterality Date    APPENDECTOMY      BACK SURGERY      x3    CHOLECYSTECTOMY      HERNIA REPAIR  2021    HIP ARTHROPLASTY Bilateral     LEG SURGERY Right 6/5/2024    RIGHT LEG INCISION AND DRAINAGE

## 2024-07-10 ENCOUNTER — APPOINTMENT (OUTPATIENT)
Age: 89
DRG: 260 | End: 2024-07-10
Attending: STUDENT IN AN ORGANIZED HEALTH CARE EDUCATION/TRAINING PROGRAM
Payer: MEDICARE

## 2024-07-10 ENCOUNTER — APPOINTMENT (OUTPATIENT)
Dept: GENERAL RADIOLOGY | Age: 89
DRG: 260 | End: 2024-07-10
Payer: MEDICARE

## 2024-07-10 LAB
ANION GAP SERPL CALCULATED.3IONS-SCNC: 10 MMOL/L (ref 7–16)
BASOPHILS # BLD: 0.04 K/UL (ref 0–0.2)
BASOPHILS NFR BLD: 1 % (ref 0–2)
BUN SERPL-MCNC: 22 MG/DL (ref 6–23)
CALCIUM SERPL-MCNC: 9.1 MG/DL (ref 8.6–10.2)
CHLORIDE SERPL-SCNC: 103 MMOL/L (ref 98–107)
CO2 SERPL-SCNC: 30 MMOL/L (ref 22–29)
CREAT SERPL-MCNC: 0.7 MG/DL (ref 0.5–1)
DATE LAST DOSE: ABNORMAL
DATE LAST DOSE: NORMAL
ECHO AO ASC DIAM: 3.1 CM
ECHO AO ASCENDING AORTA INDEX: 2.38 CM/M2
ECHO AO SINUS VALSALVA DIAM: 2.9 CM
ECHO AO SINUS VALSALVA INDEX: 2.23 CM/M2
ECHO AV AREA PEAK VELOCITY: 1.3 CM2
ECHO AV AREA VTI: 1.2 CM2
ECHO AV AREA/BSA PEAK VELOCITY: 1 CM2/M2
ECHO AV AREA/BSA VTI: 0.9 CM2/M2
ECHO AV CUSP MM: 0.7 CM
ECHO AV MEAN GRADIENT: 14 MMHG
ECHO AV MEAN VELOCITY: 1.7 M/S
ECHO AV PEAK GRADIENT: 29 MMHG
ECHO AV PEAK VELOCITY: 2.7 M/S
ECHO AV VELOCITY RATIO: 0.37
ECHO AV VTI: 56.4 CM
ECHO BSA: 1.28 M2
ECHO EST RA PRESSURE: 8 MMHG
ECHO LA DIAMETER INDEX: 2.54 CM/M2
ECHO LA DIAMETER: 3.3 CM
ECHO LA VOL A-L A2C: 39 ML (ref 22–52)
ECHO LA VOL A-L A4C: 37 ML (ref 22–52)
ECHO LA VOL MOD A2C: 38 ML (ref 22–52)
ECHO LA VOL MOD A4C: 32 ML (ref 22–52)
ECHO LA VOLUME AREA LENGTH: 40 ML
ECHO LA VOLUME INDEX A-L A2C: 30 ML/M2 (ref 16–34)
ECHO LA VOLUME INDEX A-L A4C: 28 ML/M2 (ref 16–34)
ECHO LA VOLUME INDEX AREA LENGTH: 31 ML/M2 (ref 16–34)
ECHO LA VOLUME INDEX MOD A2C: 29 ML/M2 (ref 16–34)
ECHO LA VOLUME INDEX MOD A4C: 25 ML/M2 (ref 16–34)
ECHO LV FRACTIONAL SHORTENING: 43 % (ref 28–44)
ECHO LV INTERNAL DIMENSION DIASTOLE INDEX: 2.85 CM/M2
ECHO LV INTERNAL DIMENSION DIASTOLIC: 3.7 CM (ref 3.9–5.3)
ECHO LV INTERNAL DIMENSION SYSTOLIC INDEX: 1.62 CM/M2
ECHO LV INTERNAL DIMENSION SYSTOLIC: 2.1 CM
ECHO LV IVSD: 1 CM (ref 0.6–0.9)
ECHO LV IVSS: 1.2 CM
ECHO LV MASS 2D: 96.9 G (ref 67–162)
ECHO LV MASS INDEX 2D: 74.5 G/M2 (ref 43–95)
ECHO LV POSTERIOR WALL DIASTOLIC: 0.8 CM (ref 0.6–0.9)
ECHO LV POSTERIOR WALL SYSTOLIC: 1.5 CM
ECHO LV RELATIVE WALL THICKNESS RATIO: 0.43
ECHO LVOT AREA: 3.5 CM2
ECHO LVOT AV VTI INDEX: 0.37
ECHO LVOT DIAM: 2.1 CM
ECHO LVOT MEAN GRADIENT: 2 MMHG
ECHO LVOT PEAK GRADIENT: 4 MMHG
ECHO LVOT PEAK VELOCITY: 1 M/S
ECHO LVOT STROKE VOLUME INDEX: 55.7 ML/M2
ECHO LVOT SV: 72.4 ML
ECHO LVOT VTI: 20.9 CM
ECHO MV A VELOCITY: 1.3 M/S
ECHO MV AREA PHT: 2.7 CM2
ECHO MV AREA VTI: 1.5 CM2
ECHO MV E DECELERATION TIME (DT): 275.7 MS
ECHO MV E VELOCITY: 1.56 M/S
ECHO MV E/A RATIO: 1.2
ECHO MV LVOT VTI INDEX: 2.25
ECHO MV MAX VELOCITY: 1.7 M/S
ECHO MV MEAN GRADIENT: 5 MMHG
ECHO MV MEAN VELOCITY: 1 M/S
ECHO MV PEAK GRADIENT: 11 MMHG
ECHO MV PRESSURE HALF TIME (PHT): 81 MS
ECHO MV VTI: 47 CM
ECHO PULMONARY ARTERY END DIASTOLIC PRESSURE: 8 MMHG
ECHO PV MAX VELOCITY: 1.4 M/S
ECHO PV MEAN GRADIENT: 4 MMHG
ECHO PV MEAN VELOCITY: 0.8 M/S
ECHO PV PEAK GRADIENT: 8 MMHG
ECHO PV REGURGITANT MAX VELOCITY: 1.4 M/S
ECHO PV VTI: 25.3 CM
ECHO RIGHT VENTRICULAR SYSTOLIC PRESSURE (RVSP): 67 MMHG
ECHO RV INTERNAL DIMENSION: 2.9 CM
ECHO RVOT MEAN GRADIENT: 2 MMHG
ECHO RVOT PEAK GRADIENT: 4 MMHG
ECHO RVOT PEAK VELOCITY: 1 M/S
ECHO RVOT VTI: 16.1 CM
ECHO TV REGURGITANT MAX VELOCITY: 3.84 M/S
ECHO TV REGURGITANT PEAK GRADIENT: 59 MMHG
EOSINOPHIL # BLD: 0.19 K/UL (ref 0.05–0.5)
EOSINOPHILS RELATIVE PERCENT: 3 % (ref 0–6)
ERYTHROCYTE [DISTWIDTH] IN BLOOD BY AUTOMATED COUNT: 15.4 % (ref 11.5–15)
GFR, ESTIMATED: 80 ML/MIN/1.73M2
GLUCOSE SERPL-MCNC: 81 MG/DL (ref 74–99)
HCT VFR BLD AUTO: 35.6 % (ref 34–48)
HGB BLD-MCNC: 11.4 G/DL (ref 11.5–15.5)
IMM GRANULOCYTES # BLD AUTO: 0.03 K/UL (ref 0–0.58)
IMM GRANULOCYTES NFR BLD: 0 % (ref 0–5)
LYMPHOCYTES NFR BLD: 1.41 K/UL (ref 1.5–4)
LYMPHOCYTES RELATIVE PERCENT: 20 % (ref 20–42)
MAGNESIUM SERPL-MCNC: 1.8 MG/DL (ref 1.6–2.6)
MCH RBC QN AUTO: 29.5 PG (ref 26–35)
MCHC RBC AUTO-ENTMCNC: 32 G/DL (ref 32–34.5)
MCV RBC AUTO: 92 FL (ref 80–99.9)
MICROORGANISM SPEC CULT: ABNORMAL
MICROORGANISM SPEC CULT: ABNORMAL
MICROORGANISM SPEC CULT: NORMAL
MICROORGANISM/AGENT SPEC: ABNORMAL
MICROORGANISM/AGENT SPEC: NORMAL
MONOCYTES NFR BLD: 0.94 K/UL (ref 0.1–0.95)
MONOCYTES NFR BLD: 13 % (ref 2–12)
NEUTROPHILS NFR BLD: 63 % (ref 43–80)
NEUTS SEG NFR BLD: 4.44 K/UL (ref 1.8–7.3)
PLATELET # BLD AUTO: 216 K/UL (ref 130–450)
PMV BLD AUTO: 9.8 FL (ref 7–12)
POTASSIUM SERPL-SCNC: 3.4 MMOL/L (ref 3.5–5)
RBC # BLD AUTO: 3.87 M/UL (ref 3.5–5.5)
SERVICE CMNT-IMP: ABNORMAL
SERVICE CMNT-IMP: NORMAL
SODIUM SERPL-SCNC: 143 MMOL/L (ref 132–146)
SPECIMEN DESCRIPTION: ABNORMAL
SPECIMEN DESCRIPTION: NORMAL
T4 FREE SERPL-MCNC: 1.3 NG/DL (ref 0.9–1.7)
TME LAST DOSE: ABNORMAL H
TME LAST DOSE: NORMAL H
TSH SERPL DL<=0.05 MIU/L-ACNC: 0.8 UIU/ML (ref 0.27–4.2)
VANCOMYCIN DOSE: ABNORMAL MG
VANCOMYCIN DOSE: NORMAL MG
VANCOMYCIN SERPL-MCNC: 11.1 UG/ML (ref 5–40)
VANCOMYCIN SERPL-MCNC: <4 UG/ML (ref 5–40)
WBC OTHER # BLD: 7.1 K/UL (ref 4.5–11.5)

## 2024-07-10 PROCEDURE — 99291 CRITICAL CARE FIRST HOUR: CPT | Performed by: INTERNAL MEDICINE

## 2024-07-10 PROCEDURE — 6370000000 HC RX 637 (ALT 250 FOR IP): Performed by: STUDENT IN AN ORGANIZED HEALTH CARE EDUCATION/TRAINING PROGRAM

## 2024-07-10 PROCEDURE — 93306 TTE W/DOPPLER COMPLETE: CPT | Performed by: INTERNAL MEDICINE

## 2024-07-10 PROCEDURE — 94664 DEMO&/EVAL PT USE INHALER: CPT

## 2024-07-10 PROCEDURE — 71045 X-RAY EXAM CHEST 1 VIEW: CPT

## 2024-07-10 PROCEDURE — 84443 ASSAY THYROID STIM HORMONE: CPT

## 2024-07-10 PROCEDURE — 6360000002 HC RX W HCPCS: Performed by: STUDENT IN AN ORGANIZED HEALTH CARE EDUCATION/TRAINING PROGRAM

## 2024-07-10 PROCEDURE — 97161 PT EVAL LOW COMPLEX 20 MIN: CPT

## 2024-07-10 PROCEDURE — 2580000003 HC RX 258: Performed by: NURSE PRACTITIONER

## 2024-07-10 PROCEDURE — 99232 SBSQ HOSP IP/OBS MODERATE 35: CPT | Performed by: STUDENT IN AN ORGANIZED HEALTH CARE EDUCATION/TRAINING PROGRAM

## 2024-07-10 PROCEDURE — 84439 ASSAY OF FREE THYROXINE: CPT

## 2024-07-10 PROCEDURE — 6360000004 HC RX CONTRAST MEDICATION: Performed by: NURSE PRACTITIONER

## 2024-07-10 PROCEDURE — 2060000000 HC ICU INTERMEDIATE R&B

## 2024-07-10 PROCEDURE — 80048 BASIC METABOLIC PNL TOTAL CA: CPT

## 2024-07-10 PROCEDURE — 94640 AIRWAY INHALATION TREATMENT: CPT

## 2024-07-10 PROCEDURE — 93306 TTE W/DOPPLER COMPLETE: CPT

## 2024-07-10 PROCEDURE — 93280 PM DEVICE PROGR EVAL DUAL: CPT | Performed by: INTERNAL MEDICINE

## 2024-07-10 PROCEDURE — 2580000003 HC RX 258: Performed by: STUDENT IN AN ORGANIZED HEALTH CARE EDUCATION/TRAINING PROGRAM

## 2024-07-10 PROCEDURE — 83735 ASSAY OF MAGNESIUM: CPT

## 2024-07-10 PROCEDURE — 36415 COLL VENOUS BLD VENIPUNCTURE: CPT

## 2024-07-10 PROCEDURE — 6360000002 HC RX W HCPCS: Performed by: NURSE PRACTITIONER

## 2024-07-10 PROCEDURE — 80202 ASSAY OF VANCOMYCIN: CPT

## 2024-07-10 PROCEDURE — 85025 COMPLETE CBC W/AUTO DIFF WBC: CPT

## 2024-07-10 RX ORDER — LANOLIN ALCOHOL/MO/W.PET/CERES
200 CREAM (GRAM) TOPICAL DAILY
Status: COMPLETED | OUTPATIENT
Start: 2024-07-10 | End: 2024-07-14

## 2024-07-10 RX ORDER — POTASSIUM CHLORIDE 20 MEQ/1
20 TABLET, EXTENDED RELEASE ORAL ONCE
Status: COMPLETED | OUTPATIENT
Start: 2024-07-10 | End: 2024-07-10

## 2024-07-10 RX ADMIN — ACETAMINOPHEN 650 MG: 325 TABLET ORAL at 11:16

## 2024-07-10 RX ADMIN — ALBUTEROL SULFATE 2.5 MG: 2.5 SOLUTION RESPIRATORY (INHALATION) at 19:58

## 2024-07-10 RX ADMIN — PIPERACILLIN AND TAZOBACTAM 4500 MG: 4; .5 INJECTION, POWDER, LYOPHILIZED, FOR SOLUTION INTRAVENOUS at 22:08

## 2024-07-10 RX ADMIN — ALBUTEROL SULFATE 2.5 MG: 2.5 SOLUTION RESPIRATORY (INHALATION) at 11:36

## 2024-07-10 RX ADMIN — Medication 200 MG: at 11:17

## 2024-07-10 RX ADMIN — PIPERACILLIN AND TAZOBACTAM 3375 MG: 3; .375 INJECTION, POWDER, LYOPHILIZED, FOR SOLUTION INTRAVENOUS at 15:03

## 2024-07-10 RX ADMIN — PANTOPRAZOLE SODIUM 40 MG: 40 TABLET, DELAYED RELEASE ORAL at 05:07

## 2024-07-10 RX ADMIN — SODIUM CHLORIDE, PRESERVATIVE FREE 10 ML: 5 INJECTION INTRAVENOUS at 11:17

## 2024-07-10 RX ADMIN — ATORVASTATIN CALCIUM 20 MG: 20 TABLET, FILM COATED ORAL at 11:17

## 2024-07-10 RX ADMIN — ALBUTEROL SULFATE 2.5 MG: 2.5 SOLUTION RESPIRATORY (INHALATION) at 07:39

## 2024-07-10 RX ADMIN — ACETAMINOPHEN 650 MG: 325 TABLET ORAL at 20:40

## 2024-07-10 RX ADMIN — VANCOMYCIN HYDROCHLORIDE 1000 MG: 1 INJECTION, POWDER, LYOPHILIZED, FOR SOLUTION INTRAVENOUS at 11:32

## 2024-07-10 RX ADMIN — POTASSIUM CHLORIDE 20 MEQ: 1500 TABLET, EXTENDED RELEASE ORAL at 11:17

## 2024-07-10 RX ADMIN — IOPAMIDOL 75 ML: 755 INJECTION, SOLUTION INTRAVENOUS at 00:01

## 2024-07-10 RX ADMIN — WATER 2000 MG: 1 INJECTION INTRAMUSCULAR; INTRAVENOUS; SUBCUTANEOUS at 05:06

## 2024-07-10 RX ADMIN — DOCUSATE SODIUM 100 MG: 100 CAPSULE, LIQUID FILLED ORAL at 20:40

## 2024-07-10 RX ADMIN — SODIUM CHLORIDE, PRESERVATIVE FREE 10 ML: 5 INJECTION INTRAVENOUS at 20:41

## 2024-07-10 ASSESSMENT — PAIN - FUNCTIONAL ASSESSMENT: PAIN_FUNCTIONAL_ASSESSMENT: PREVENTS OR INTERFERES SOME ACTIVE ACTIVITIES AND ADLS

## 2024-07-10 ASSESSMENT — PAIN SCALES - GENERAL
PAINLEVEL_OUTOF10: 5
PAINLEVEL_OUTOF10: 0
PAINLEVEL_OUTOF10: 5

## 2024-07-10 ASSESSMENT — PAIN DESCRIPTION - DESCRIPTORS: DESCRIPTORS: ACHING;SHARP;SHOOTING

## 2024-07-10 ASSESSMENT — PAIN DESCRIPTION - LOCATION: LOCATION: CHEST

## 2024-07-10 ASSESSMENT — PAIN DESCRIPTION - ORIENTATION: ORIENTATION: LEFT;UPPER

## 2024-07-10 ASSESSMENT — PAIN DESCRIPTION - PAIN TYPE: TYPE: ACUTE PAIN

## 2024-07-10 NOTE — PLAN OF CARE
Problem: Safety - Adult  Goal: Free from fall injury  7/10/2024 1906 by Deana Epps, RN  Outcome: Progressing  7/10/2024 0605 by Macie Andrew RN  Outcome: Progressing     Problem: Discharge Planning  Goal: Discharge to home or other facility with appropriate resources  7/10/2024 1906 by Deana Epps RN  Outcome: Progressing  Flowsheets (Taken 7/10/2024 0802)  Discharge to home or other facility with appropriate resources: Identify barriers to discharge with patient and caregiver  7/10/2024 0605 by Macie Andrew RN  Outcome: Progressing     Problem: ABCDS Injury Assessment  Goal: Absence of physical injury  7/10/2024 1906 by Deana Epps RN  Outcome: Progressing  7/10/2024 0605 by Macie Andrew RN  Outcome: Progressing

## 2024-07-10 NOTE — FLOWSHEET NOTE
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  PPM was implanted on March 2024 at [facility].  Patient's daughter states it all started with a scratch on the skin caused by the bra. Over the weekend it turn red and then pus was extracted from it yesterday when she went to wound care clinic. She was prescribed doxycycline. Has been having chills and sleeping more for past couple days and localized pain. Denies fevers,other chest pain, palpitations, abdominal pain, nausea, vomiting, shortness of breath. Does have a chronic cough.     Findings:     07/10/24 1337   Skin Integumentary    Skin Integrity   (redness blanchable soft)   Location bilateral heels   Skin Integrity Site 2   Skin Integrity Location 2   (dry flaky)   Location 2 BLE   Wound 07/08/24 # 2 chest left upper   Date First Assessed/Time First Assessed: 07/08/24 1140   Present on Original Admission: Yes  Wound Approximate Age at First Assessment (Weeks): 4 weeks  Wound Description (Comments): # 2 chest left upper   Wound Image    Wound Etiology Other   Dressing Status New dressing applied   Wound Cleansed Cleansed with saline   Dressing/Treatment Alginate;Dry dressing   Wound Length (cm) 1.8 cm   Wound Width (cm) 0.8 cm   Wound Depth (cm) 0.1 cm   Wound Surface Area (cm^2) 1.44 cm^2   Change in Wound Size % (l*w) -140   Wound Volume (cm^3) 0.144 cm^3   Wound Healing % -140   Wound Assessment Pink/red   Drainage Amount Scant (moist but unmeasurable)   Drainage Description Purulent   Odor None   Jess-wound Assessment Fragile

## 2024-07-10 NOTE — DISCHARGE INSTRUCTIONS
[facility] instructions  L chest wound  Pack wound daily with alginate ag  Abdominal pad or 4x4 outer dressing - paper tape to secure  R LE wound  Alginate ag  Abdominal pad  Koban 2 - from just above the toes to tibial tuberosity - just below the knee    Your information:  Name: Austin Gaines  : 1935      What to do after you leave the hospital:    Recommended diet: {diet:55585}    Recommended activity: {discharge activity:98950}        The following personal items were collected during your admission and were returned to you:    Belongings  Dental Appliances: Uppers  Vision - Corrective Lenses: Eyeglasses  Hearing Aid: Sent home  Clothing: Footwear, Shirt, Pants, Socks, Jacket/Coat  Jewelry: Watch  Electronic Devices: None  Weapons (Notify Protective Services/Security): None  Home Medications: None  Valuables Given To: Patient  Provide Name(s) of Who Valuable(s) Were Given To: austin    Information obtained by:  By signing below, I understand that if any problems occur once I leave the hospital I am to contact ***.  I understand and acknowledge receipt of the instructions indicated above.   [facility]  ([facility])  [address] [place] [address]  Phone [phone]   Fax [phone]        Abner Hirsch MD, FACP, MD Kelly Lopez MD Indra P. Limbu, MD Munir P. Shah, MD April I. Miller RN, CNP      Jeb Martinez RN, CNS      ISIAH ALLEN MD SUNGKAPALEE, THRISSAWAN, MD     STANDING ORDERS (“ID Protocol”)     Visiting nurses are to write the Primary Care Physician and their own call back number on all laboratory requisition forms.   Abnormal lab values are called to the physician by the nurse and NOT by the laboratory.   Fax all labs to the office in a timely manner, during office hours. All faxes should include nurse’s name and call back number.  [employer]

## 2024-07-10 NOTE — CARE COORDINATION
Met with pt and daughter at bedside, pt had recent pace maker placed,  readmitted for draining around the site of pacemaker.  Pt was discharged home with [facility], notified Matilda with [facility] of admission. Pt lives with daughter, has cane, walker, shower chair, bedside commode, and 2LO2 through [employer]. Plan is home at discharge, daughter to transport. Dr. Mercer is PCP and goes to [facility] for pharmacy needs. Wound care, PT/OT,.Kierra Fung, MSW, LSW

## 2024-07-11 ENCOUNTER — ANESTHESIA EVENT (OUTPATIENT)
Age: 89
End: 2024-07-11
Payer: MEDICARE

## 2024-07-11 ENCOUNTER — ANESTHESIA (OUTPATIENT)
Age: 89
End: 2024-07-11
Payer: MEDICARE

## 2024-07-11 ENCOUNTER — APPOINTMENT (OUTPATIENT)
Age: 89
DRG: 260 | End: 2024-07-11
Attending: INTERNAL MEDICINE
Payer: MEDICARE

## 2024-07-11 LAB
ANION GAP SERPL CALCULATED.3IONS-SCNC: 10 MMOL/L (ref 7–16)
BASOPHILS # BLD: 0.02 K/UL (ref 0–0.2)
BASOPHILS NFR BLD: 0 % (ref 0–2)
BUN SERPL-MCNC: 13 MG/DL (ref 6–23)
CALCIUM SERPL-MCNC: 8.9 MG/DL (ref 8.6–10.2)
CHLORIDE SERPL-SCNC: 106 MMOL/L (ref 98–107)
CO2 SERPL-SCNC: 27 MMOL/L (ref 22–29)
CREAT SERPL-MCNC: 0.7 MG/DL (ref 0.5–1)
ECHO AO SINUS VALSALVA DIAM: 3.3 CM
ECHO AO SINUS VALSALVA INDEX: 2.54 CM/M2
ECHO BSA: 1.28 M2
ECHO BSA: 1.28 M2
ECHO EST RA PRESSURE: 8 MMHG
ECHO RIGHT VENTRICULAR SYSTOLIC PRESSURE (RVSP): 66 MMHG
ECHO TV REGURGITANT MAX VELOCITY: 3.81 M/S
ECHO TV REGURGITANT PEAK GRADIENT: 58 MMHG
EOSINOPHIL # BLD: 0.16 K/UL (ref 0.05–0.5)
EOSINOPHILS RELATIVE PERCENT: 2 % (ref 0–6)
ERYTHROCYTE [DISTWIDTH] IN BLOOD BY AUTOMATED COUNT: 15.9 % (ref 11.5–15)
GFR, ESTIMATED: 84 ML/MIN/1.73M2
GLUCOSE SERPL-MCNC: 83 MG/DL (ref 74–99)
HCT VFR BLD AUTO: 35.5 % (ref 34–48)
HGB BLD-MCNC: 10.9 G/DL (ref 11.5–15.5)
IMM GRANULOCYTES # BLD AUTO: 0.03 K/UL (ref 0–0.58)
IMM GRANULOCYTES NFR BLD: 0 % (ref 0–5)
LYMPHOCYTES NFR BLD: 1.25 K/UL (ref 1.5–4)
LYMPHOCYTES RELATIVE PERCENT: 17 % (ref 20–42)
MAGNESIUM SERPL-MCNC: 2 MG/DL (ref 1.6–2.6)
MCH RBC QN AUTO: 28.5 PG (ref 26–35)
MCHC RBC AUTO-ENTMCNC: 30.7 G/DL (ref 32–34.5)
MCV RBC AUTO: 92.9 FL (ref 80–99.9)
MONOCYTES NFR BLD: 0.77 K/UL (ref 0.1–0.95)
MONOCYTES NFR BLD: 11 % (ref 2–12)
NEUTROPHILS NFR BLD: 69 % (ref 43–80)
NEUTS SEG NFR BLD: 4.99 K/UL (ref 1.8–7.3)
PLATELET # BLD AUTO: 193 K/UL (ref 130–450)
PMV BLD AUTO: 9.9 FL (ref 7–12)
POTASSIUM SERPL-SCNC: 3.5 MMOL/L (ref 3.5–5)
RBC # BLD AUTO: 3.82 M/UL (ref 3.5–5.5)
SODIUM SERPL-SCNC: 143 MMOL/L (ref 132–146)
WBC OTHER # BLD: 7.2 K/UL (ref 4.5–11.5)

## 2024-07-11 PROCEDURE — 93312 ECHO TRANSESOPHAGEAL: CPT | Performed by: INTERNAL MEDICINE

## 2024-07-11 PROCEDURE — 80048 BASIC METABOLIC PNL TOTAL CA: CPT

## 2024-07-11 PROCEDURE — 2709999900 HC NON-CHARGEABLE SUPPLY: Performed by: INTERNAL MEDICINE

## 2024-07-11 PROCEDURE — 6360000002 HC RX W HCPCS: Performed by: INTERNAL MEDICINE

## 2024-07-11 PROCEDURE — 93312 ECHO TRANSESOPHAGEAL: CPT

## 2024-07-11 PROCEDURE — 36415 COLL VENOUS BLD VENIPUNCTURE: CPT

## 2024-07-11 PROCEDURE — 0JPT3PZ REMOVAL OF CARDIAC RHYTHM RELATED DEVICE FROM TRUNK SUBCUTANEOUS TISSUE AND FASCIA, PERCUTANEOUS APPROACH: ICD-10-PCS | Performed by: INTERNAL MEDICINE

## 2024-07-11 PROCEDURE — 99223 1ST HOSP IP/OBS HIGH 75: CPT | Performed by: SURGERY

## 2024-07-11 PROCEDURE — 33233 REMOVAL OF PM GENERATOR: CPT | Performed by: INTERNAL MEDICINE

## 2024-07-11 PROCEDURE — 83735 ASSAY OF MAGNESIUM: CPT

## 2024-07-11 PROCEDURE — 7100000011 HC PHASE II RECOVERY - ADDTL 15 MIN: Performed by: INTERNAL MEDICINE

## 2024-07-11 PROCEDURE — 6360000002 HC RX W HCPCS: Performed by: STUDENT IN AN ORGANIZED HEALTH CARE EDUCATION/TRAINING PROGRAM

## 2024-07-11 PROCEDURE — 2500000003 HC RX 250 WO HCPCS: Performed by: INTERNAL MEDICINE

## 2024-07-11 PROCEDURE — 87075 CULTR BACTERIA EXCEPT BLOOD: CPT

## 2024-07-11 PROCEDURE — 6370000000 HC RX 637 (ALT 250 FOR IP): Performed by: STUDENT IN AN ORGANIZED HEALTH CARE EDUCATION/TRAINING PROGRAM

## 2024-07-11 PROCEDURE — 2580000003 HC RX 258: Performed by: STUDENT IN AN ORGANIZED HEALTH CARE EDUCATION/TRAINING PROGRAM

## 2024-07-11 PROCEDURE — 3700000001 HC ADD 15 MINUTES (ANESTHESIA): Performed by: INTERNAL MEDICINE

## 2024-07-11 PROCEDURE — 2060000000 HC ICU INTERMEDIATE R&B

## 2024-07-11 PROCEDURE — 2580000003 HC RX 258: Performed by: NURSE PRACTITIONER

## 2024-07-11 PROCEDURE — 87070 CULTURE OTHR SPECIMN AEROBIC: CPT

## 2024-07-11 PROCEDURE — 3700000000 HC ANESTHESIA ATTENDED CARE: Performed by: INTERNAL MEDICINE

## 2024-07-11 PROCEDURE — 7100000010 HC PHASE II RECOVERY - FIRST 15 MIN: Performed by: INTERNAL MEDICINE

## 2024-07-11 PROCEDURE — 94640 AIRWAY INHALATION TREATMENT: CPT

## 2024-07-11 PROCEDURE — 6370000000 HC RX 637 (ALT 250 FOR IP): Performed by: INTERNAL MEDICINE

## 2024-07-11 PROCEDURE — 85025 COMPLETE CBC W/AUTO DIFF WBC: CPT

## 2024-07-11 PROCEDURE — 02PA3MZ REMOVAL OF CARDIAC LEAD FROM HEART, PERCUTANEOUS APPROACH: ICD-10-PCS | Performed by: INTERNAL MEDICINE

## 2024-07-11 PROCEDURE — 87071 CULTURE AEROBIC QUANT OTHER: CPT

## 2024-07-11 PROCEDURE — 93325 DOPPLER ECHO COLOR FLOW MAPG: CPT | Performed by: INTERNAL MEDICINE

## 2024-07-11 PROCEDURE — 99232 SBSQ HOSP IP/OBS MODERATE 35: CPT | Performed by: STUDENT IN AN ORGANIZED HEALTH CARE EDUCATION/TRAINING PROGRAM

## 2024-07-11 PROCEDURE — 87077 CULTURE AEROBIC IDENTIFY: CPT

## 2024-07-11 PROCEDURE — 2580000003 HC RX 258: Performed by: NURSE ANESTHETIST, CERTIFIED REGISTERED

## 2024-07-11 PROCEDURE — 6360000002 HC RX W HCPCS: Performed by: NURSE ANESTHETIST, CERTIFIED REGISTERED

## 2024-07-11 PROCEDURE — 33235 REMOVAL PACEMAKER ELECTRODE: CPT | Performed by: INTERNAL MEDICINE

## 2024-07-11 PROCEDURE — 87205 SMEAR GRAM STAIN: CPT

## 2024-07-11 PROCEDURE — 99291 CRITICAL CARE FIRST HOUR: CPT | Performed by: INTERNAL MEDICINE

## 2024-07-11 PROCEDURE — 2580000003 HC RX 258: Performed by: INTERNAL MEDICINE

## 2024-07-11 PROCEDURE — 2720000010 HC SURG SUPPLY STERILE: Performed by: INTERNAL MEDICINE

## 2024-07-11 PROCEDURE — 93320 DOPPLER ECHO COMPLETE: CPT | Performed by: INTERNAL MEDICINE

## 2024-07-11 PROCEDURE — 6360000002 HC RX W HCPCS: Performed by: NURSE PRACTITIONER

## 2024-07-11 PROCEDURE — B24BZZ4 ULTRASONOGRAPHY OF HEART WITH AORTA, TRANSESOPHAGEAL: ICD-10-PCS | Performed by: INTERNAL MEDICINE

## 2024-07-11 RX ORDER — SODIUM CHLORIDE 9 MG/ML
INJECTION, SOLUTION INTRAVENOUS CONTINUOUS PRN
Status: DISCONTINUED | OUTPATIENT
Start: 2024-07-11 | End: 2024-07-11 | Stop reason: SDUPTHER

## 2024-07-11 RX ORDER — FENTANYL CITRATE 50 UG/ML
INJECTION, SOLUTION INTRAMUSCULAR; INTRAVENOUS PRN
Status: DISCONTINUED | OUTPATIENT
Start: 2024-07-11 | End: 2024-07-11 | Stop reason: SDUPTHER

## 2024-07-11 RX ORDER — MORPHINE SULFATE 2 MG/ML
1 INJECTION, SOLUTION INTRAMUSCULAR; INTRAVENOUS EVERY 4 HOURS PRN
Status: DISCONTINUED | OUTPATIENT
Start: 2024-07-11 | End: 2024-07-13

## 2024-07-11 RX ORDER — AMIODARONE HYDROCHLORIDE 200 MG/1
200 TABLET ORAL DAILY
Status: DISCONTINUED | OUTPATIENT
Start: 2024-07-11 | End: 2024-07-11

## 2024-07-11 RX ORDER — PROPOFOL 10 MG/ML
INJECTION, EMULSION INTRAVENOUS PRN
Status: DISCONTINUED | OUTPATIENT
Start: 2024-07-11 | End: 2024-07-11 | Stop reason: SDUPTHER

## 2024-07-11 RX ORDER — AMIODARONE HYDROCHLORIDE 200 MG/1
200 TABLET ORAL 2 TIMES DAILY
Status: DISCONTINUED | OUTPATIENT
Start: 2024-07-11 | End: 2024-07-15 | Stop reason: HOSPADM

## 2024-07-11 RX ORDER — OXYCODONE HYDROCHLORIDE AND ACETAMINOPHEN 5; 325 MG/1; MG/1
1 TABLET ORAL EVERY 6 HOURS PRN
Status: DISCONTINUED | OUTPATIENT
Start: 2024-07-11 | End: 2024-07-13

## 2024-07-11 RX ORDER — CEFAZOLIN SODIUM 1 G/3ML
INJECTION, POWDER, FOR SOLUTION INTRAMUSCULAR; INTRAVENOUS PRN
Status: DISCONTINUED | OUTPATIENT
Start: 2024-07-11 | End: 2024-07-11 | Stop reason: SDUPTHER

## 2024-07-11 RX ORDER — VANCOMYCIN HYDROCHLORIDE 1 G/20ML
INJECTION, POWDER, LYOPHILIZED, FOR SOLUTION INTRAVENOUS PRN
Status: DISCONTINUED | OUTPATIENT
Start: 2024-07-11 | End: 2024-07-11 | Stop reason: SDUPTHER

## 2024-07-11 RX ADMIN — FENTANYL CITRATE 50 MCG: 50 INJECTION, SOLUTION INTRAMUSCULAR; INTRAVENOUS at 15:04

## 2024-07-11 RX ADMIN — SODIUM CHLORIDE: 9 INJECTION, SOLUTION INTRAVENOUS at 14:55

## 2024-07-11 RX ADMIN — SODIUM CHLORIDE: 9 INJECTION, SOLUTION INTRAVENOUS at 14:50

## 2024-07-11 RX ADMIN — PROPOFOL 130 MG: 10 INJECTION, EMULSION INTRAVENOUS at 12:06

## 2024-07-11 RX ADMIN — AMIODARONE HYDROCHLORIDE 200 MG: 200 TABLET ORAL at 20:58

## 2024-07-11 RX ADMIN — Medication 200 MG: at 08:17

## 2024-07-11 RX ADMIN — FENTANYL CITRATE 50 MCG: 50 INJECTION, SOLUTION INTRAMUSCULAR; INTRAVENOUS at 14:50

## 2024-07-11 RX ADMIN — FENTANYL CITRATE 25 MCG: 50 INJECTION, SOLUTION INTRAMUSCULAR; INTRAVENOUS at 15:16

## 2024-07-11 RX ADMIN — OXYCODONE HYDROCHLORIDE AND ACETAMINOPHEN 1 TABLET: 5; 325 TABLET ORAL at 20:57

## 2024-07-11 RX ADMIN — DOCUSATE SODIUM 100 MG: 100 CAPSULE, LIQUID FILLED ORAL at 20:58

## 2024-07-11 RX ADMIN — PIPERACILLIN AND TAZOBACTAM 4500 MG: 4; .5 INJECTION, POWDER, LYOPHILIZED, FOR SOLUTION INTRAVENOUS at 05:42

## 2024-07-11 RX ADMIN — CEFAZOLIN 2 G: 1 INJECTION, POWDER, FOR SOLUTION INTRAMUSCULAR; INTRAVENOUS at 14:55

## 2024-07-11 RX ADMIN — VANCOMYCIN HYDROCHLORIDE 1000 MG: 1 INJECTION, POWDER, LYOPHILIZED, FOR SOLUTION INTRAVENOUS at 14:55

## 2024-07-11 RX ADMIN — ALBUTEROL SULFATE 2.5 MG: 2.5 SOLUTION RESPIRATORY (INHALATION) at 01:01

## 2024-07-11 RX ADMIN — FENTANYL CITRATE 25 MCG: 50 INJECTION, SOLUTION INTRAMUSCULAR; INTRAVENOUS at 15:33

## 2024-07-11 RX ADMIN — SODIUM CHLORIDE, PRESERVATIVE FREE 10 ML: 5 INJECTION INTRAVENOUS at 08:18

## 2024-07-11 RX ADMIN — PIPERACILLIN AND TAZOBACTAM 4500 MG: 4; .5 INJECTION, POWDER, LYOPHILIZED, FOR SOLUTION INTRAVENOUS at 18:14

## 2024-07-11 RX ADMIN — SODIUM CHLORIDE, PRESERVATIVE FREE 10 ML: 5 INJECTION INTRAVENOUS at 21:00

## 2024-07-11 RX ADMIN — FENTANYL CITRATE 25 MCG: 50 INJECTION, SOLUTION INTRAMUSCULAR; INTRAVENOUS at 14:55

## 2024-07-11 RX ADMIN — PROPOFOL 20 MG: 10 INJECTION, EMULSION INTRAVENOUS at 15:04

## 2024-07-11 RX ADMIN — ALBUTEROL SULFATE 2.5 MG: 2.5 SOLUTION RESPIRATORY (INHALATION) at 20:27

## 2024-07-11 RX ADMIN — PROPOFOL 30 MG: 10 INJECTION, EMULSION INTRAVENOUS at 14:50

## 2024-07-11 RX ADMIN — ALBUTEROL SULFATE 2.5 MG: 2.5 SOLUTION RESPIRATORY (INHALATION) at 08:01

## 2024-07-11 RX ADMIN — FENTANYL CITRATE 25 MCG: 50 INJECTION, SOLUTION INTRAMUSCULAR; INTRAVENOUS at 15:10

## 2024-07-11 RX ADMIN — PANTOPRAZOLE SODIUM 40 MG: 40 TABLET, DELAYED RELEASE ORAL at 05:42

## 2024-07-11 RX ADMIN — PROPOFOL 25 MCG/KG/MIN: 10 INJECTION, EMULSION INTRAVENOUS at 15:13

## 2024-07-11 RX ADMIN — VANCOMYCIN HYDROCHLORIDE 1000 MG: 1 INJECTION, POWDER, LYOPHILIZED, FOR SOLUTION INTRAVENOUS at 00:05

## 2024-07-11 RX ADMIN — ALBUTEROL SULFATE 2.5 MG: 2.5 SOLUTION RESPIRATORY (INHALATION) at 16:50

## 2024-07-11 RX ADMIN — ATORVASTATIN CALCIUM 20 MG: 20 TABLET, FILM COATED ORAL at 08:18

## 2024-07-11 RX ADMIN — SODIUM CHLORIDE: 9 INJECTION, SOLUTION INTRAVENOUS at 11:57

## 2024-07-11 ASSESSMENT — PAIN SCALES - GENERAL
PAINLEVEL_OUTOF10: 8
PAINLEVEL_OUTOF10: 0
PAINLEVEL_OUTOF10: 0

## 2024-07-11 ASSESSMENT — LIFESTYLE VARIABLES
SMOKING_STATUS: 0
SMOKING_STATUS: 0

## 2024-07-11 ASSESSMENT — PAIN DESCRIPTION - LOCATION: LOCATION: CHEST

## 2024-07-11 NOTE — ANESTHESIA POSTPROCEDURE EVALUATION
Department of Anesthesiology  Postprocedure Note    Patient: Shauna Gaines  MRN: [id number]  YOB: 1935  Date of evaluation: 7/11/2024    Procedure Summary       Date: 07/11/24 Room / Location: [facility] Cardiac Cath Lab; [facility] Noninvasive Cardiology    Anesthesia Start: 1154 Anesthesia Stop: 1223    Procedure: BELINDA (PRN CONTRAST/BUBBLE/3D) Diagnosis: Pacemaker infection, initial encounter ([facility])    Scheduled Providers: Campbell Spear MD Responsible Provider: Campbell Spear MD    Anesthesia Type: MAC ASA Status: 4            Anesthesia Type: No value filed.    Cata Phase I: Cata Score: 10    Cata Phase II:      Anesthesia Post Evaluation    Patient location during evaluation: PACU  Patient participation: complete - patient participated  Level of consciousness: awake  Pain score: 3  Airway patency: patent  Nausea & Vomiting: no nausea and no vomiting  Cardiovascular status: blood pressure returned to baseline  Respiratory status: acceptable  Hydration status: euvolemic      No notable events documented.

## 2024-07-11 NOTE — PLAN OF CARE
Problem: Safety - Adult  Goal: Free from fall injury  7/10/2024 2343 by Macie Andrew RN  Outcome: Progressing  7/10/2024 1906 by Deana Epps RN  Outcome: Progressing     Problem: Discharge Planning  Goal: Discharge to home or other facility with appropriate resources  7/10/2024 2343 by Macie Andrew RN  Outcome: Progressing  7/10/2024 1906 by Deana Epps RN  Outcome: Progressing  Flowsheets (Taken 7/10/2024 0802)  Discharge to home or other facility with appropriate resources: Identify barriers to discharge with patient and caregiver     Problem: ABCDS Injury Assessment  Goal: Absence of physical injury  7/10/2024 2343 by Macie Andrew RN  Outcome: Progressing  7/10/2024 1906 by Deana Epps RN  Outcome: Progressing     Problem: Pain  Goal: Verbalizes/displays adequate comfort level or baseline comfort level  Outcome: Progressing

## 2024-07-11 NOTE — ANESTHESIA POSTPROCEDURE EVALUATION
Department of Anesthesiology  Postprocedure Note    Patient: Shauna Gaines  MRN: [id number]  YOB: 1935  Date of evaluation: 7/11/2024    Procedure Summary       Date: 07/11/24 Room / Location: [facility] EP LAB 1 / [facility] CARDIAC CATH LAB    Anesthesia Start:  Anesthesia Stop:     Procedure: Pacemaker lead extraction transvenous Diagnosis:       Heart disease, unspecified      (Heart disease, unspecified [I51.9])    Providers: Sharla Chavira MD Responsible Provider:     Anesthesia Type: MAC ASA Status: 4            Anesthesia Type: No value filed.    Cata Phase I: Cata Score: 10    Cata Phase II:      Anesthesia Post Evaluation    Patient location during evaluation: PACU  Patient participation: complete - patient participated  Level of consciousness: awake  Pain score: 3  Airway patency: patent  Nausea & Vomiting: no nausea and no vomiting  Cardiovascular status: blood pressure returned to baseline  Respiratory status: acceptable  Hydration status: euvolemic    No notable events documented.

## 2024-07-11 NOTE — CARE COORDINATION
Reviewed chart, pt scheduled to have pace maker removed today. Currently on IV vanco q24 and IV zosyn q8. BELINDA and chest xray ordered. Pt is active with [facility], will need home health care orders. Plan is home with daughter at discharge.ELIAZAR Fang LSW  .Case Management Assessment  Initial Evaluation    Date/Time of Evaluation: 7/11/2024 11:17 AM  Assessment Completed by: ELIAZAR Fang LSW    If patient is discharged prior to next notation, then this note serves as note for discharge by case management.    Patient Name: Shauna Gaines                   YOB: 1935  Diagnosis: Dehiscence of operative wound, initial encounter [T81.31XA]  Pacemaker infection, initial encounter (HCC) [T82.7XXA]                   Date / Time: 7/9/2024  3:51 PM    Patient Admission Status: Inpatient   Readmission Risk (Low < 19, Mod (19-27), High > 27): Readmission Risk Score: 20    Current PCP: Ross Mercer MD  PCP verified by CM? Yes    Chart Reviewed: Yes      History Provided by: Patient, Child/Family  Patient Orientation: Alert and Oriented    Patient Cognition: Alert    Hospitalization in the last 30 days (Readmission):  Yes    If yes, Readmission Assessment in CM Navigator will be completed.    Advance Directives:      Code Status: Limited   Patient's Primary Decision Maker is: Named in Scanned ACP Document    Primary Decision Maker: Luzmaria Keller - Child - [phone]    Primary Decision Maker: JavanIrena  Child - [phone]    Discharge Planning:    Patient lives with: Children Type of Home: House  Primary Care Giver: Family  Patient Support Systems include: Children, Family Members   Current Financial resources:    Current community resources:    Current services prior to admission: Oxygen Therapy            Current DME:              Type of Home Care services:  None    ADLS  Prior functional level: Assistance with the following:, Cooking, Housework, Shopping  Current functional

## 2024-07-11 NOTE — CONSULTS
[facility] Electrophysiology  Consultation Report  PATIENT: Shauna Gaines  MEDICAL RECORD NUMBER: [id number]  DATE OF SERVICE:  7/10/2024  ATTENDING ELECTROPHYSIOLOGIST: Chai Rivero MD  PRIMARY ELECTROPHYSIOLOGIST: Chai Rivero MD  REFERRING PHYSICIAN: No ref. provider found and Ross Mercer MD  CHIEF COMPLAINT: Drainage at pacemaker site    HPI: This is a 89 y.o. female with a history of   Patient Active Problem List   Diagnosis    Choledocholithiasis    Dilated cbd, acquired    Cellulitis of right leg    Cellulitis    Wound of right leg    Severe protein-calorie malnutrition (HCC)    Acute respiratory failure with hypoxia and hypercapnia (HCC)    Acute respiratory failure with hypoxia ([facility])    Chest wall abscess    Chronic ulcer of right leg with fat layer exposed ([facility])    Pacemaker infection, initial encounter ([facility])    Rupture of operation wound   who presents to the hospital complaining of drainage at pacemaker site. The patient has history of paroxysmal atrial fibrillation diagnosed on 3/15/24, sinus node dysfunction status post pacemaker implantation on 3/25/24, valvular heart disease, hypertension, hyperlipidemia, COPD and impaired hearing. The patient was admitted to [facility] in March 2024 due to respiratory infection and was diagnosed with sinus node dysfunction due to AF with pauses. She subsequently underwent Abbott dual chamber pacemaker implantation on 3/25/24. She was admitted to [facility] in June 2023 due to sepsis secondary to right lower extremity wound infection and cellulitis status post incision and drainage of the right leg abscess on 6/5/34. Culture of the wound showed pan sensitivity of Enterococcus Faecalis.She was discharged to NH with Zyvox.She was admitted at [facility] in June 2024 due to AF with RVR. Echo on 6/15/24 showed LV EF 55-60%. She was discharged home with Toprol XL 50 mg BID and Eliquis. The patient noticed scab at 
Vascular Surgery Consultation Note    Reason for Consult: Wound care management following pacemaker removal    HPI:    This is a 89 y.o. female who is admitted to the hospital for treatment of infection surrounding her pacemaker location.  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 Patient has Abbott dual chamber pacemaker implantation on 3/25/24. Patient states a few week ago she had a scratch over her pacemaker location and it turned red and then started having purulent drainage.  She initially went to wound clinic and wound culture was performed on 7/8/24.  Cardiac electrophysiology service was consulted for pacemaker site infection.  She underwent pacemaker removal on 7/11.     Of note, patient has a RLE wound s/p I&D on 6/5/34 and has been following with a local wound clinic at [facility].     Vascular surgery is consulted for wound management from prior pacemaker location      ROS: Negative if blank [], Positive if [x]  General Vascular   [] Fevers [] Claudication (Blocks)   [] Chills [] Rest Pain   [] Weight Loss [] Tissue Loss   [] Chest Pain [] Clotting Disorder   [] SOB at rest [] Leg Swelling   [] SOB with exertion [] DVT/PE      [] Nausea    [] Vomiting [] Stroke/TIA   [] Abdominal Pain [] Focal weakness   [] Melena [] Slurred Speech   [] Hematochezia [] Vision Changes   [] Hematuria    [] Dysuria [] Hx of Central Catheters   [] Wears Glasses/Contacts [] Dialysis and If so date initiated   [] Blindness    [x] Right Hand Dominant   [] Difficulty swallowing        Past Medical History:   Diagnosis Date    Aortic valve stenosis     Asthma     COPD (chronic obstructive pulmonary disease) ([facility])     COVID 12/2022    Hearing aid worn     Heart murmur     Hyperlipidemia     Hypertension         Past Surgical History:   Procedure Laterality Date    APPENDECTOMY      BACK SURGERY      x3    CHOLECYSTECTOMY      HERNIA REPAIR  2021    HIP ARTHROPLASTY Bilateral     
and L1 compression deformities.             Assessment and PLan   Principal Problem:    Pacemaker infection, initial encounter ([facility])  Active Problems:    Rupture of operation wound  Resolved Problems:    * No resolved hospital problems. *    Assessment  Pacemaker pocket infection  Leg wound    Plan:  Stop cefazolin.  Continue empiric vancomycin.  Start empiric Zosyn waiting on sensitivities of Pseudomonas.  CRP sed rate procalcitonin.  EP following-plan for removal of pacemaker with reinsertion as an outpatient  Supportive care  Will follow patient with you.    Patient's status and treatment plan discussed with Dr. Rogers    Thank you for inviting [facility] to participate in the care of of Mrs. Gaines    Electronically signed by AZALEA Ryan CNP on 7/10/2024 at 2:17 PM

## 2024-07-11 NOTE — PROCEDURES
PROCEDURE NOTE  Date: 7/11/2024   Name: Shauna Gaines  YOB: 1935    Procedures:  Preprocedure diagnosis: Bacteremia    Procedures: BELINDA    Primary procedure  Written informed consent was obtained, the BELINDA was performed under stable fasting condition. The patient was set up for monitoring of surface EKG and pulse oximetry continuously. Blood pressure was monitored and with automatic cuff measurements. Supplemental oxygen was administered. The procedure was performed under anesthesia by anesthesiology. After ensuring adequate anesthesia, BELINDA probe was intubated without difficulty    Result: Moderate to severe MAC, no echocardiographic evidence of endocarditis, full report to follow.    Complication: None    Patient was monitored until awake and vitals remained stable.    David Garay M.D.  [facility] cardiology

## 2024-07-11 NOTE — PLAN OF CARE
Problem: Safety - Adult  Goal: Free from fall injury  7/11/2024 1043 by Zuly Cedeno RN  Outcome: Progressing  7/11/2024 1041 by Zuly Cedeno RN  Outcome: Progressing  7/10/2024 2343 by Macie Andrew RN  Outcome: Progressing     Problem: Discharge Planning  Goal: Discharge to home or other facility with appropriate resources  7/11/2024 1043 by Zuly Cedeno RN  Outcome: Progressing  7/11/2024 1041 by Zuly Cedeno RN  Outcome: Progressing  7/10/2024 2343 by Macie Andrew RN  Outcome: Progressing     Problem: ABCDS Injury Assessment  Goal: Absence of physical injury  7/11/2024 1043 by Zuly Cedeno RN  Outcome: Progressing  7/11/2024 1041 by Zuly Cedeno RN  Outcome: Progressing  7/10/2024 2343 by Macie Andrew RN  Outcome: Progressing     Problem: Pain  Goal: Verbalizes/displays adequate comfort level or baseline comfort level  7/11/2024 1043 by Zuly Cedeno RN  Outcome: Progressing  7/11/2024 1041 by Zuly Cedeno RN  Outcome: Progressing  7/10/2024 2343 by Macie Andrew RN  Outcome: Progressing     Problem: Nutrition Deficit:  Goal: Optimize nutritional status  7/11/2024 1043 by Zuly Cedeno RN  Outcome: Progressing  7/11/2024 1041 by Zuly Cedeno RN  Outcome: Progressing  Flowsheets (Taken 7/11/2024 0958 by Carlitos Barker, RD)  Nutrient intake appropriate for improving, restoring, or maintaining nutritional needs: Monitor oral intake, labs, and treatment plans

## 2024-07-11 NOTE — PLAN OF CARE
Problem: Safety - Adult  Goal: Free from fall injury  7/11/2024 1041 by Zuly Cedeno RN  Outcome: Progressing  7/10/2024 2343 by Macie Andrew RN  Outcome: Progressing     Problem: Discharge Planning  Goal: Discharge to home or other facility with appropriate resources  7/11/2024 1041 by Zuly Cedeno RN  Outcome: Progressing  7/10/2024 2343 by Macie Andrew RN  Outcome: Progressing     Problem: ABCDS Injury Assessment  Goal: Absence of physical injury  7/11/2024 1041 by Zuly Cedeno RN  Outcome: Progressing  7/10/2024 2343 by Macie Andrew RN  Outcome: Progressing     Problem: Pain  Goal: Verbalizes/displays adequate comfort level or baseline comfort level  7/11/2024 1041 by Zuly Cedeno RN  Outcome: Progressing  7/10/2024 2343 by Macie Andrew RN  Outcome: Progressing     Problem: Nutrition Deficit:  Goal: Optimize nutritional status  Outcome: Progressing  Flowsheets (Taken 7/11/2024 0958 by Carlitos Barker, RD)  Nutrient intake appropriate for improving, restoring, or maintaining nutritional needs: Monitor oral intake, labs, and treatment plans

## 2024-07-11 NOTE — ANESTHESIA PRE PROCEDURE
Department of Anesthesiology  Preprocedure Note       Name:  Shauna Gaines   Age:  89 y.o.  :  1935                                          MRN:  [id number]         Date:  2024      Surgeon: Dr. Garay    Procedure: BELINDA    Medications prior to admission:   Prior to Admission medications    Medication Sig Start Date End Date Taking? Authorizing Provider   doxycycline hyclate (VIBRA-TABS) 100 MG tablet Take 1 tablet by mouth 2 times daily for 10 days Take with food 24  Yudelka Lockett APRN - CNP   metoprolol succinate (TOPROL XL) 50 MG extended release tablet Take 1 tablet by mouth in the morning and at bedtime 24   Dann Lin APRN - CNP   furosemide (LASIX) 20 MG tablet Take 2 tablets by mouth daily 24   Dann Lin APRN - CNP   pantoprazole (PROTONIX) 40 MG tablet Take 1 tablet by mouth every morning (before breakfast) 24   Dann Lin APRN - CNP   docusate sodium (COLACE) 100 MG capsule Take 1 capsule by mouth nightly    Stephania Hardy MD   ELIQUIS 2.5 MG TABS tablet Take 1 tablet by mouth 2 times daily 24   Ritika Tripathi MD   potassium chloride (KLOR-CON M) 20 MEQ extended release tablet Take 1 tablet by mouth daily 24   Ritika Tripathi MD   SYMBICORT 80-4.5 MCG/ACT AERO Inhale 2 puffs into the lungs in the morning and 2 puffs in the evening. 23   Stephania Hardy MD   simvastatin (ZOCOR) 20 MG tablet Take 1 tablet by mouth daily    Stephania Hardy MD   Cholecalciferol (VITAMIN D3) 50 MCG (2000) CAPS Take 1 capsule by mouth daily    Stephania Hardy MD   Multiple Vitamins-Minerals (PROSIGHT) TABS Take 1 tablet by mouth daily    Stephania Hardy MD   albuterol (PROVENTIL) (2.5 MG/3ML) 0.083% nebulizer solution Take 3 mLs by nebulization in the morning and at bedtime 19   Stephania Hardy MD       Current medications:    Current Facility-Administered Medications   Medication Dose Route Frequency Provider Last

## 2024-07-12 LAB
ANION GAP SERPL CALCULATED.3IONS-SCNC: 13 MMOL/L (ref 7–16)
BASOPHILS # BLD: 0.04 K/UL (ref 0–0.2)
BASOPHILS NFR BLD: 1 % (ref 0–2)
BUN SERPL-MCNC: 9 MG/DL (ref 6–23)
CALCIUM SERPL-MCNC: 8.6 MG/DL (ref 8.6–10.2)
CHLORIDE SERPL-SCNC: 109 MMOL/L (ref 98–107)
CO2 SERPL-SCNC: 17 MMOL/L (ref 22–29)
CREAT SERPL-MCNC: 0.6 MG/DL (ref 0.5–1)
DATE LAST DOSE: NORMAL
EOSINOPHIL # BLD: 0.33 K/UL (ref 0.05–0.5)
EOSINOPHILS RELATIVE PERCENT: 5 % (ref 0–6)
ERYTHROCYTE [DISTWIDTH] IN BLOOD BY AUTOMATED COUNT: 15.9 % (ref 11.5–15)
GFR, ESTIMATED: 86 ML/MIN/1.73M2
GLUCOSE SERPL-MCNC: 74 MG/DL (ref 74–99)
HCT VFR BLD AUTO: 33.8 % (ref 34–48)
HGB BLD-MCNC: 9.8 G/DL (ref 11.5–15.5)
IMM GRANULOCYTES # BLD AUTO: 0.03 K/UL (ref 0–0.58)
IMM GRANULOCYTES NFR BLD: 1 % (ref 0–5)
LYMPHOCYTES NFR BLD: 1.07 K/UL (ref 1.5–4)
LYMPHOCYTES RELATIVE PERCENT: 16 % (ref 20–42)
MCH RBC QN AUTO: 28.5 PG (ref 26–35)
MCHC RBC AUTO-ENTMCNC: 29 G/DL (ref 32–34.5)
MCV RBC AUTO: 98.3 FL (ref 80–99.9)
MICROORGANISM SPEC CULT: ABNORMAL
MICROORGANISM/AGENT SPEC: ABNORMAL
MONOCYTES NFR BLD: 0.68 K/UL (ref 0.1–0.95)
MONOCYTES NFR BLD: 10 % (ref 2–12)
NEUTROPHILS NFR BLD: 67 % (ref 43–80)
NEUTS SEG NFR BLD: 4.42 K/UL (ref 1.8–7.3)
PLATELET # BLD AUTO: 140 K/UL (ref 130–450)
PMV BLD AUTO: 11.3 FL (ref 7–12)
POTASSIUM SERPL-SCNC: 3.9 MMOL/L (ref 3.5–5)
RBC # BLD AUTO: 3.44 M/UL (ref 3.5–5.5)
SODIUM SERPL-SCNC: 139 MMOL/L (ref 132–146)
SPECIMEN DESCRIPTION: ABNORMAL
TME LAST DOSE: NORMAL H
VANCOMYCIN DOSE: NORMAL MG
VANCOMYCIN SERPL-MCNC: 13 UG/ML (ref 5–40)
WBC OTHER # BLD: 6.6 K/UL (ref 4.5–11.5)

## 2024-07-12 PROCEDURE — 6370000000 HC RX 637 (ALT 250 FOR IP): Performed by: STUDENT IN AN ORGANIZED HEALTH CARE EDUCATION/TRAINING PROGRAM

## 2024-07-12 PROCEDURE — 99232 SBSQ HOSP IP/OBS MODERATE 35: CPT | Performed by: STUDENT IN AN ORGANIZED HEALTH CARE EDUCATION/TRAINING PROGRAM

## 2024-07-12 PROCEDURE — 36415 COLL VENOUS BLD VENIPUNCTURE: CPT

## 2024-07-12 PROCEDURE — 94640 AIRWAY INHALATION TREATMENT: CPT

## 2024-07-12 PROCEDURE — 6360000002 HC RX W HCPCS: Performed by: INTERNAL MEDICINE

## 2024-07-12 PROCEDURE — 2060000000 HC ICU INTERMEDIATE R&B

## 2024-07-12 PROCEDURE — 97535 SELF CARE MNGMENT TRAINING: CPT

## 2024-07-12 PROCEDURE — 85025 COMPLETE CBC W/AUTO DIFF WBC: CPT

## 2024-07-12 PROCEDURE — 2580000003 HC RX 258: Performed by: INTERNAL MEDICINE

## 2024-07-12 PROCEDURE — 6370000000 HC RX 637 (ALT 250 FOR IP): Performed by: INTERNAL MEDICINE

## 2024-07-12 PROCEDURE — 2500000003 HC RX 250 WO HCPCS

## 2024-07-12 PROCEDURE — 80048 BASIC METABOLIC PNL TOTAL CA: CPT

## 2024-07-12 PROCEDURE — 80202 ASSAY OF VANCOMYCIN: CPT

## 2024-07-12 PROCEDURE — 97165 OT EVAL LOW COMPLEX 30 MIN: CPT

## 2024-07-12 RX ORDER — AMIODARONE HYDROCHLORIDE 200 MG/1
200 TABLET ORAL DAILY
Qty: 30 TABLET | Refills: 3 | Status: SHIPPED | OUTPATIENT
Start: 2024-07-27

## 2024-07-12 RX ORDER — METOPROLOL TARTRATE 1 MG/ML
INJECTION, SOLUTION INTRAVENOUS
Status: COMPLETED
Start: 2024-07-12 | End: 2024-07-12

## 2024-07-12 RX ORDER — METOPROLOL TARTRATE 1 MG/ML
5 INJECTION, SOLUTION INTRAVENOUS ONCE
Status: DISCONTINUED | OUTPATIENT
Start: 2024-07-12 | End: 2024-07-12 | Stop reason: ALTCHOICE

## 2024-07-12 RX ORDER — AMIODARONE HYDROCHLORIDE 200 MG/1
200 TABLET ORAL 2 TIMES DAILY
Qty: 28 TABLET | Refills: 0 | Status: SHIPPED | OUTPATIENT
Start: 2024-07-12 | End: 2024-07-26

## 2024-07-12 RX ADMIN — ALBUTEROL SULFATE 2.5 MG: 2.5 SOLUTION RESPIRATORY (INHALATION) at 08:37

## 2024-07-12 RX ADMIN — AMIODARONE HYDROCHLORIDE 200 MG: 200 TABLET ORAL at 08:41

## 2024-07-12 RX ADMIN — SODIUM CHLORIDE, PRESERVATIVE FREE 10 ML: 5 INJECTION INTRAVENOUS at 20:22

## 2024-07-12 RX ADMIN — OXYCODONE HYDROCHLORIDE AND ACETAMINOPHEN 1 TABLET: 5; 325 TABLET ORAL at 08:41

## 2024-07-12 RX ADMIN — SODIUM CHLORIDE, PRESERVATIVE FREE 10 ML: 5 INJECTION INTRAVENOUS at 08:42

## 2024-07-12 RX ADMIN — PIPERACILLIN AND TAZOBACTAM 4500 MG: 4; .5 INJECTION, POWDER, LYOPHILIZED, FOR SOLUTION INTRAVENOUS at 01:59

## 2024-07-12 RX ADMIN — ATORVASTATIN CALCIUM 20 MG: 20 TABLET, FILM COATED ORAL at 08:41

## 2024-07-12 RX ADMIN — ALBUTEROL SULFATE 2.5 MG: 2.5 SOLUTION RESPIRATORY (INHALATION) at 21:03

## 2024-07-12 RX ADMIN — DOCUSATE SODIUM 100 MG: 100 CAPSULE, LIQUID FILLED ORAL at 20:21

## 2024-07-12 RX ADMIN — PIPERACILLIN AND TAZOBACTAM 4500 MG: 4; .5 INJECTION, POWDER, LYOPHILIZED, FOR SOLUTION INTRAVENOUS at 10:22

## 2024-07-12 RX ADMIN — Medication 200 MG: at 08:40

## 2024-07-12 RX ADMIN — ALBUTEROL SULFATE 2.5 MG: 2.5 SOLUTION RESPIRATORY (INHALATION) at 11:45

## 2024-07-12 RX ADMIN — OXYCODONE HYDROCHLORIDE AND ACETAMINOPHEN 1 TABLET: 5; 325 TABLET ORAL at 14:44

## 2024-07-12 RX ADMIN — OXYCODONE HYDROCHLORIDE AND ACETAMINOPHEN 1 TABLET: 5; 325 TABLET ORAL at 20:43

## 2024-07-12 RX ADMIN — METOPROLOL TARTRATE 5 MG: 1 INJECTION, SOLUTION INTRAVENOUS at 22:03

## 2024-07-12 RX ADMIN — AMIODARONE HYDROCHLORIDE 200 MG: 200 TABLET ORAL at 20:21

## 2024-07-12 RX ADMIN — CEFEPIME 2000 MG: 2 INJECTION, POWDER, FOR SOLUTION INTRAVENOUS at 15:43

## 2024-07-12 RX ADMIN — PANTOPRAZOLE SODIUM 40 MG: 40 TABLET, DELAYED RELEASE ORAL at 05:08

## 2024-07-12 RX ADMIN — ALBUTEROL SULFATE 2.5 MG: 2.5 SOLUTION RESPIRATORY (INHALATION) at 14:13

## 2024-07-12 ASSESSMENT — PAIN SCALES - GENERAL
PAINLEVEL_OUTOF10: 7
PAINLEVEL_OUTOF10: 9
PAINLEVEL_OUTOF10: 5
PAINLEVEL_OUTOF10: 8
PAINLEVEL_OUTOF10: 4
PAINLEVEL_OUTOF10: 0

## 2024-07-12 ASSESSMENT — PAIN DESCRIPTION - LOCATION
LOCATION: CHEST

## 2024-07-12 ASSESSMENT — PAIN DESCRIPTION - ORIENTATION: ORIENTATION: LEFT;UPPER

## 2024-07-12 NOTE — CARE COORDINATION
Reviewed chart, pt currently on IV cefepime q8. Referral to [facility] to follow for possible home IV antibiotics. Notified Matilda at [facility] of possible IV antibiotics. If IV antibiotics are needed, will need picline. Pt's plan is home with daughterIsaías is acive will need home health care orders.Kierra Fung, MSW, LSW

## 2024-07-12 NOTE — PLAN OF CARE
Problem: Safety - Adult  Goal: Free from fall injury  7/12/2024 1041 by Zuly Cedeno RN  Outcome: Progressing  7/11/2024 2322 by Kwaku Carolina RN  Outcome: Progressing     Problem: Discharge Planning  Goal: Discharge to home or other facility with appropriate resources  7/12/2024 1041 by Zuly Cedeno RN  Outcome: Progressing  7/11/2024 2322 by Kwaku Carolina RN  Outcome: Progressing     Problem: ABCDS Injury Assessment  Goal: Absence of physical injury  7/12/2024 1041 by Zuly Cedeno RN  Outcome: Progressing  7/11/2024 2322 by Kwaku Carolina RN  Outcome: Progressing     Problem: Pain  Goal: Verbalizes/displays adequate comfort level or baseline comfort level  7/12/2024 1041 by Zuly Cedeno RN  Outcome: Progressing  7/11/2024 2322 by Kwaku Carolina RN  Outcome: Progressing     Problem: Nutrition Deficit:  Goal: Optimize nutritional status  Outcome: Progressing

## 2024-07-12 NOTE — PLAN OF CARE
Problem: Safety - Adult  Goal: Free from fall injury  7/11/2024 2002 by Zuly Cedeno RN  Outcome: Progressing  7/11/2024 1043 by Zuly Cedeno RN  Outcome: Progressing  7/11/2024 1041 by Zuly Cedeno RN  Outcome: Progressing     Problem: Discharge Planning  Goal: Discharge to home or other facility with appropriate resources  7/11/2024 2002 by Zuly Cedeno RN  Outcome: Progressing  7/11/2024 1043 by Zuly Cedeno RN  Outcome: Progressing  7/11/2024 1041 by Zuly Cedeno RN  Outcome: Progressing     Problem: ABCDS Injury Assessment  Goal: Absence of physical injury  7/11/2024 2002 by Zuly Cedeno RN  Outcome: Progressing  7/11/2024 1043 by Zuly Cedeno RN  Outcome: Progressing  7/11/2024 1041 by Zuly Cedeno RN  Outcome: Progressing     Problem: Pain  Goal: Verbalizes/displays adequate comfort level or baseline comfort level  7/11/2024 2002 by Zuly Cedeno RN  Outcome: Progressing  7/11/2024 1043 by Zuly Cedeno RN  Outcome: Progressing  7/11/2024 1041 by Zuly Cedeno RN  Outcome: Progressing     Problem: Nutrition Deficit:  Goal: Optimize nutritional status  7/11/2024 2002 by Zuly Cedeno RN  Outcome: Progressing  7/11/2024 1043 by Zuly Cedeno RN  Outcome: Progressing  7/11/2024 1041 by Zuly Cedeno RN  Outcome: Progressing  Flowsheets (Taken 7/11/2024 0958 by Carlitos Barker, BARBARA)  Nutrient intake appropriate for improving, restoring, or maintaining nutritional needs: Monitor oral intake, labs, and treatment plans

## 2024-07-13 LAB
MICROORGANISM SPEC CULT: ABNORMAL
MICROORGANISM SPEC CULT: NORMAL
SERVICE CMNT-IMP: ABNORMAL
SERVICE CMNT-IMP: NORMAL
SPECIMEN DESCRIPTION: ABNORMAL
SPECIMEN DESCRIPTION: NORMAL

## 2024-07-13 PROCEDURE — 6370000000 HC RX 637 (ALT 250 FOR IP): Performed by: STUDENT IN AN ORGANIZED HEALTH CARE EDUCATION/TRAINING PROGRAM

## 2024-07-13 PROCEDURE — 2580000003 HC RX 258: Performed by: INTERNAL MEDICINE

## 2024-07-13 PROCEDURE — 6370000000 HC RX 637 (ALT 250 FOR IP): Performed by: SURGERY

## 2024-07-13 PROCEDURE — 6370000000 HC RX 637 (ALT 250 FOR IP): Performed by: INTERNAL MEDICINE

## 2024-07-13 PROCEDURE — 2060000000 HC ICU INTERMEDIATE R&B

## 2024-07-13 PROCEDURE — 6360000002 HC RX W HCPCS: Performed by: STUDENT IN AN ORGANIZED HEALTH CARE EDUCATION/TRAINING PROGRAM

## 2024-07-13 PROCEDURE — 6360000002 HC RX W HCPCS: Performed by: INTERNAL MEDICINE

## 2024-07-13 PROCEDURE — 6360000002 HC RX W HCPCS: Performed by: SURGERY

## 2024-07-13 PROCEDURE — 94640 AIRWAY INHALATION TREATMENT: CPT

## 2024-07-13 PROCEDURE — 99232 SBSQ HOSP IP/OBS MODERATE 35: CPT | Performed by: STUDENT IN AN ORGANIZED HEALTH CARE EDUCATION/TRAINING PROGRAM

## 2024-07-13 RX ORDER — LIDOCAINE HYDROCHLORIDE 10 MG/ML
50 INJECTION, SOLUTION EPIDURAL; INFILTRATION; INTRACAUDAL; PERINEURAL ONCE
Status: DISCONTINUED | OUTPATIENT
Start: 2024-07-13 | End: 2024-07-15 | Stop reason: HOSPADM

## 2024-07-13 RX ORDER — SODIUM CHLORIDE 0.9 % (FLUSH) 0.9 %
5-40 SYRINGE (ML) INJECTION PRN
Status: DISCONTINUED | OUTPATIENT
Start: 2024-07-13 | End: 2024-07-15 | Stop reason: HOSPADM

## 2024-07-13 RX ORDER — OXYCODONE HYDROCHLORIDE AND ACETAMINOPHEN 5; 325 MG/1; MG/1
1 TABLET ORAL EVERY 4 HOURS PRN
Status: DISCONTINUED | OUTPATIENT
Start: 2024-07-13 | End: 2024-07-15 | Stop reason: HOSPADM

## 2024-07-13 RX ORDER — SODIUM CHLORIDE 0.9 % (FLUSH) 0.9 %
5-40 SYRINGE (ML) INJECTION EVERY 12 HOURS SCHEDULED
Status: DISCONTINUED | OUTPATIENT
Start: 2024-07-13 | End: 2024-07-15 | Stop reason: HOSPADM

## 2024-07-13 RX ORDER — MORPHINE SULFATE 2 MG/ML
1 INJECTION, SOLUTION INTRAMUSCULAR; INTRAVENOUS ONCE
Status: COMPLETED | OUTPATIENT
Start: 2024-07-13 | End: 2024-07-13

## 2024-07-13 RX ORDER — SODIUM CHLORIDE 9 MG/ML
INJECTION, SOLUTION INTRAVENOUS PRN
Status: DISCONTINUED | OUTPATIENT
Start: 2024-07-13 | End: 2024-07-15 | Stop reason: HOSPADM

## 2024-07-13 RX ORDER — KETOROLAC TROMETHAMINE 30 MG/ML
15 INJECTION, SOLUTION INTRAMUSCULAR; INTRAVENOUS EVERY 6 HOURS PRN
Status: DISCONTINUED | OUTPATIENT
Start: 2024-07-13 | End: 2024-07-15 | Stop reason: HOSPADM

## 2024-07-13 RX ORDER — MORPHINE SULFATE 2 MG/ML
2 INJECTION, SOLUTION INTRAMUSCULAR; INTRAVENOUS
Status: DISCONTINUED | OUTPATIENT
Start: 2024-07-13 | End: 2024-07-15 | Stop reason: HOSPADM

## 2024-07-13 RX ADMIN — KETOROLAC TROMETHAMINE 15 MG: 30 INJECTION, SOLUTION INTRAMUSCULAR at 12:55

## 2024-07-13 RX ADMIN — CEFEPIME 2000 MG: 2 INJECTION, POWDER, FOR SOLUTION INTRAVENOUS at 03:17

## 2024-07-13 RX ADMIN — MORPHINE SULFATE 1 MG: 2 INJECTION, SOLUTION INTRAMUSCULAR; INTRAVENOUS at 09:54

## 2024-07-13 RX ADMIN — AMIODARONE HYDROCHLORIDE 200 MG: 200 TABLET ORAL at 09:04

## 2024-07-13 RX ADMIN — PANTOPRAZOLE SODIUM 40 MG: 40 TABLET, DELAYED RELEASE ORAL at 05:05

## 2024-07-13 RX ADMIN — ALBUTEROL SULFATE 2.5 MG: 2.5 SOLUTION RESPIRATORY (INHALATION) at 06:08

## 2024-07-13 RX ADMIN — ALBUTEROL SULFATE 2.5 MG: 2.5 SOLUTION RESPIRATORY (INHALATION) at 10:17

## 2024-07-13 RX ADMIN — Medication 200 MG: at 09:04

## 2024-07-13 RX ADMIN — OXYCODONE HYDROCHLORIDE AND ACETAMINOPHEN 1 TABLET: 5; 325 TABLET ORAL at 10:41

## 2024-07-13 RX ADMIN — OXYCODONE HYDROCHLORIDE AND ACETAMINOPHEN 1 TABLET: 5; 325 TABLET ORAL at 20:28

## 2024-07-13 RX ADMIN — APIXABAN 2.5 MG: 2.5 TABLET, FILM COATED ORAL at 09:04

## 2024-07-13 RX ADMIN — SODIUM CHLORIDE, PRESERVATIVE FREE 10 ML: 5 INJECTION INTRAVENOUS at 21:08

## 2024-07-13 RX ADMIN — SODIUM CHLORIDE, PRESERVATIVE FREE 10 ML: 5 INJECTION INTRAVENOUS at 09:00

## 2024-07-13 RX ADMIN — MORPHINE SULFATE 1 MG: 2 INJECTION, SOLUTION INTRAMUSCULAR; INTRAVENOUS at 09:08

## 2024-07-13 RX ADMIN — SODIUM CHLORIDE, PRESERVATIVE FREE 10 ML: 5 INJECTION INTRAVENOUS at 20:27

## 2024-07-13 RX ADMIN — DOCUSATE SODIUM 100 MG: 100 CAPSULE, LIQUID FILLED ORAL at 20:28

## 2024-07-13 RX ADMIN — OXYCODONE HYDROCHLORIDE AND ACETAMINOPHEN 1 TABLET: 5; 325 TABLET ORAL at 15:36

## 2024-07-13 RX ADMIN — AMIODARONE HYDROCHLORIDE 200 MG: 200 TABLET ORAL at 20:28

## 2024-07-13 RX ADMIN — CEFEPIME 2000 MG: 2 INJECTION, POWDER, FOR SOLUTION INTRAVENOUS at 16:23

## 2024-07-13 RX ADMIN — OXYCODONE HYDROCHLORIDE AND ACETAMINOPHEN 1 TABLET: 5; 325 TABLET ORAL at 02:50

## 2024-07-13 RX ADMIN — ATORVASTATIN CALCIUM 20 MG: 20 TABLET, FILM COATED ORAL at 09:04

## 2024-07-13 RX ADMIN — APIXABAN 2.5 MG: 2.5 TABLET, FILM COATED ORAL at 20:28

## 2024-07-13 ASSESSMENT — PAIN DESCRIPTION - DESCRIPTORS
DESCRIPTORS: ACHING;NAGGING;THROBBING
DESCRIPTORS: ACHING;DISCOMFORT;SHARP;SHOOTING
DESCRIPTORS: ACHING;DISCOMFORT;DULL

## 2024-07-13 ASSESSMENT — PAIN SCALES - GENERAL
PAINLEVEL_OUTOF10: 7
PAINLEVEL_OUTOF10: 2
PAINLEVEL_OUTOF10: 8
PAINLEVEL_OUTOF10: 3
PAINLEVEL_OUTOF10: 2
PAINLEVEL_OUTOF10: 8
PAINLEVEL_OUTOF10: 5
PAINLEVEL_OUTOF10: 10
PAINLEVEL_OUTOF10: 8
PAINLEVEL_OUTOF10: 5
PAINLEVEL_OUTOF10: 7
PAINLEVEL_OUTOF10: 8

## 2024-07-13 ASSESSMENT — PAIN DESCRIPTION - LOCATION
LOCATION: CHEST
LOCATION: CHEST;LEG
LOCATION: CHEST

## 2024-07-13 ASSESSMENT — PAIN DESCRIPTION - FREQUENCY
FREQUENCY: INTERMITTENT
FREQUENCY: INTERMITTENT

## 2024-07-13 ASSESSMENT — PAIN DESCRIPTION - PAIN TYPE
TYPE: ACUTE PAIN;SURGICAL PAIN
TYPE: ACUTE PAIN;SURGICAL PAIN

## 2024-07-13 ASSESSMENT — PAIN - FUNCTIONAL ASSESSMENT
PAIN_FUNCTIONAL_ASSESSMENT: ACTIVITIES ARE NOT PREVENTED
PAIN_FUNCTIONAL_ASSESSMENT: ACTIVITIES ARE NOT PREVENTED
PAIN_FUNCTIONAL_ASSESSMENT: PREVENTS OR INTERFERES SOME ACTIVE ACTIVITIES AND ADLS

## 2024-07-13 ASSESSMENT — PAIN DESCRIPTION - ONSET: ONSET: AWAKENED FROM SLEEP

## 2024-07-13 ASSESSMENT — PAIN DESCRIPTION - ORIENTATION
ORIENTATION: LEFT;UPPER
ORIENTATION: LEFT;UPPER;RIGHT;LOWER

## 2024-07-13 NOTE — PLAN OF CARE
Problem: Safety - Adult  Goal: Free from fall injury  Outcome: Progressing     Problem: Discharge Planning  Goal: Discharge to home or other facility with appropriate resources  Outcome: Progressing     Problem: ABCDS Injury Assessment  Goal: Absence of physical injury  Outcome: Progressing     Problem: Pain  Goal: Verbalizes/displays adequate comfort level or baseline comfort level  Outcome: Progressing     Problem: Nutrition Deficit:  Goal: Optimize nutritional status  Outcome: Progressing

## 2024-07-14 ENCOUNTER — APPOINTMENT (OUTPATIENT)
Dept: GENERAL RADIOLOGY | Age: 89
DRG: 260 | End: 2024-07-14
Payer: MEDICARE

## 2024-07-14 LAB
MICROORGANISM SPEC CULT: ABNORMAL
MICROORGANISM SPEC CULT: NORMAL
MICROORGANISM SPEC CULT: NORMAL
MICROORGANISM/AGENT SPEC: ABNORMAL
SERVICE CMNT-IMP: ABNORMAL
SERVICE CMNT-IMP: ABNORMAL
SERVICE CMNT-IMP: NORMAL
SERVICE CMNT-IMP: NORMAL
SPECIMEN DESCRIPTION: ABNORMAL
SPECIMEN DESCRIPTION: ABNORMAL
SPECIMEN DESCRIPTION: NORMAL
SPECIMEN DESCRIPTION: NORMAL

## 2024-07-14 PROCEDURE — 2580000003 HC RX 258: Performed by: INTERNAL MEDICINE

## 2024-07-14 PROCEDURE — 6370000000 HC RX 637 (ALT 250 FOR IP): Performed by: INTERNAL MEDICINE

## 2024-07-14 PROCEDURE — C1751 CATH, INF, PER/CENT/MIDLINE: HCPCS

## 2024-07-14 PROCEDURE — 94640 AIRWAY INHALATION TREATMENT: CPT

## 2024-07-14 PROCEDURE — 2060000000 HC ICU INTERMEDIATE R&B

## 2024-07-14 PROCEDURE — 99231 SBSQ HOSP IP/OBS SF/LOW 25: CPT | Performed by: SURGERY

## 2024-07-14 PROCEDURE — 05H933Z INSERTION OF INFUSION DEVICE INTO RIGHT BRACHIAL VEIN, PERCUTANEOUS APPROACH: ICD-10-PCS | Performed by: STUDENT IN AN ORGANIZED HEALTH CARE EDUCATION/TRAINING PROGRAM

## 2024-07-14 PROCEDURE — 6370000000 HC RX 637 (ALT 250 FOR IP): Performed by: SURGERY

## 2024-07-14 PROCEDURE — 6360000002 HC RX W HCPCS: Performed by: INTERNAL MEDICINE

## 2024-07-14 PROCEDURE — 71045 X-RAY EXAM CHEST 1 VIEW: CPT

## 2024-07-14 PROCEDURE — 6360000002 HC RX W HCPCS: Performed by: SURGERY

## 2024-07-14 PROCEDURE — 36568 INSJ PICC <5 YR W/O IMAGING: CPT

## 2024-07-14 PROCEDURE — 76937 US GUIDE VASCULAR ACCESS: CPT

## 2024-07-14 PROCEDURE — 99232 SBSQ HOSP IP/OBS MODERATE 35: CPT | Performed by: STUDENT IN AN ORGANIZED HEALTH CARE EDUCATION/TRAINING PROGRAM

## 2024-07-14 RX ORDER — OXYCODONE HYDROCHLORIDE AND ACETAMINOPHEN 5; 325 MG/1; MG/1
1 TABLET ORAL EVERY 6 HOURS PRN
Qty: 20 TABLET | Refills: 0 | Status: SHIPPED | OUTPATIENT
Start: 2024-07-14 | End: 2024-07-19

## 2024-07-14 RX ORDER — ALBUTEROL SULFATE 2.5 MG/3ML
2.5 SOLUTION RESPIRATORY (INHALATION)
Status: DISCONTINUED | OUTPATIENT
Start: 2024-07-14 | End: 2024-07-15 | Stop reason: HOSPADM

## 2024-07-14 RX ORDER — ALBUTEROL SULFATE 2.5 MG/3ML
2.5 SOLUTION RESPIRATORY (INHALATION) EVERY 4 HOURS PRN
Status: DISCONTINUED | OUTPATIENT
Start: 2024-07-14 | End: 2024-07-15 | Stop reason: HOSPADM

## 2024-07-14 RX ORDER — MELOXICAM 7.5 MG/1
7.5 TABLET ORAL DAILY PRN
Qty: 7 TABLET | Refills: 0 | Status: SHIPPED | OUTPATIENT
Start: 2024-07-14 | End: 2024-07-21

## 2024-07-14 RX ADMIN — ATORVASTATIN CALCIUM 20 MG: 20 TABLET, FILM COATED ORAL at 08:15

## 2024-07-14 RX ADMIN — CEFEPIME 2000 MG: 2 INJECTION, POWDER, FOR SOLUTION INTRAVENOUS at 03:50

## 2024-07-14 RX ADMIN — APIXABAN 2.5 MG: 2.5 TABLET, FILM COATED ORAL at 08:15

## 2024-07-14 RX ADMIN — SODIUM CHLORIDE, PRESERVATIVE FREE 10 ML: 5 INJECTION INTRAVENOUS at 20:00

## 2024-07-14 RX ADMIN — AMIODARONE HYDROCHLORIDE 200 MG: 200 TABLET ORAL at 19:57

## 2024-07-14 RX ADMIN — AMIODARONE HYDROCHLORIDE 200 MG: 200 TABLET ORAL at 08:15

## 2024-07-14 RX ADMIN — CEFEPIME 2000 MG: 2 INJECTION, POWDER, FOR SOLUTION INTRAVENOUS at 16:24

## 2024-07-14 RX ADMIN — ALBUTEROL SULFATE 2.5 MG: 2.5 SOLUTION RESPIRATORY (INHALATION) at 20:06

## 2024-07-14 RX ADMIN — MORPHINE SULFATE 2 MG: 2 INJECTION, SOLUTION INTRAMUSCULAR; INTRAVENOUS at 09:33

## 2024-07-14 RX ADMIN — OXYCODONE HYDROCHLORIDE AND ACETAMINOPHEN 1 TABLET: 5; 325 TABLET ORAL at 00:19

## 2024-07-14 RX ADMIN — OXYCODONE HYDROCHLORIDE AND ACETAMINOPHEN 1 TABLET: 5; 325 TABLET ORAL at 14:05

## 2024-07-14 RX ADMIN — MORPHINE SULFATE 2 MG: 2 INJECTION, SOLUTION INTRAMUSCULAR; INTRAVENOUS at 22:42

## 2024-07-14 RX ADMIN — SODIUM CHLORIDE, PRESERVATIVE FREE 10 ML: 5 INJECTION INTRAVENOUS at 08:16

## 2024-07-14 RX ADMIN — OXYCODONE HYDROCHLORIDE AND ACETAMINOPHEN 1 TABLET: 5; 325 TABLET ORAL at 19:57

## 2024-07-14 RX ADMIN — ALBUTEROL SULFATE 2.5 MG: 2.5 SOLUTION RESPIRATORY (INHALATION) at 08:19

## 2024-07-14 RX ADMIN — MORPHINE SULFATE 2 MG: 2 INJECTION, SOLUTION INTRAMUSCULAR; INTRAVENOUS at 12:53

## 2024-07-14 RX ADMIN — PANTOPRAZOLE SODIUM 40 MG: 40 TABLET, DELAYED RELEASE ORAL at 06:48

## 2024-07-14 RX ADMIN — APIXABAN 2.5 MG: 2.5 TABLET, FILM COATED ORAL at 19:57

## 2024-07-14 RX ADMIN — Medication 200 MG: at 08:15

## 2024-07-14 RX ADMIN — OXYCODONE HYDROCHLORIDE AND ACETAMINOPHEN 1 TABLET: 5; 325 TABLET ORAL at 07:47

## 2024-07-14 RX ADMIN — DOCUSATE SODIUM 100 MG: 100 CAPSULE, LIQUID FILLED ORAL at 19:57

## 2024-07-14 ASSESSMENT — PAIN SCALES - GENERAL
PAINLEVEL_OUTOF10: 6
PAINLEVEL_OUTOF10: 5
PAINLEVEL_OUTOF10: 7
PAINLEVEL_OUTOF10: 9
PAINLEVEL_OUTOF10: 5
PAINLEVEL_OUTOF10: 4
PAINLEVEL_OUTOF10: 0
PAINLEVEL_OUTOF10: 3
PAINLEVEL_OUTOF10: 7
PAINLEVEL_OUTOF10: 5
PAINLEVEL_OUTOF10: 5
PAINLEVEL_OUTOF10: 7
PAINLEVEL_OUTOF10: 5

## 2024-07-14 ASSESSMENT — PAIN DESCRIPTION - LOCATION
LOCATION: CHEST

## 2024-07-14 ASSESSMENT — PAIN DESCRIPTION - DESCRIPTORS
DESCRIPTORS: BURNING
DESCRIPTORS: ACHING;NAGGING;SQUEEZING

## 2024-07-14 NOTE — PLAN OF CARE
Problem: Safety - Adult  Goal: Free from fall injury  7/13/2024 2208 by Macie Andrew RN  Outcome: Progressing  7/13/2024 1413 by Zuly Cedeno RN  Outcome: Progressing     Problem: Discharge Planning  Goal: Discharge to home or other facility with appropriate resources  7/13/2024 2208 by Macie Andrew RN  Outcome: Progressing  7/13/2024 1413 by Zuly Cedeno RN  Outcome: Progressing     Problem: ABCDS Injury Assessment  Goal: Absence of physical injury  7/13/2024 2208 by Macie Andrew RN  Outcome: Progressing  7/13/2024 1413 by Zuly Cedeno RN  Outcome: Progressing     Problem: Pain  Goal: Verbalizes/displays adequate comfort level or baseline comfort level  7/13/2024 2208 by Macie Andrew RN  Outcome: Progressing  7/13/2024 1413 by Zuly Cedeno RN  Outcome: Progressing     Problem: Nutrition Deficit:  Goal: Optimize nutritional status  7/13/2024 2208 by Macie Andrew RN  Outcome: Progressing  7/13/2024 1413 by Zuly Cedeno RN  Outcome: Progressing

## 2024-07-15 ENCOUNTER — HOSPITAL ENCOUNTER (OUTPATIENT)
Dept: WOUND CARE | Age: 89
Discharge: HOME | End: 2024-07-15

## 2024-07-15 VITALS
OXYGEN SATURATION: 93 % | HEIGHT: 60 IN | HEART RATE: 64 BPM | TEMPERATURE: 97.8 F | SYSTOLIC BLOOD PRESSURE: 117 MMHG | DIASTOLIC BLOOD PRESSURE: 53 MMHG | WEIGHT: 85 LBS | RESPIRATION RATE: 18 BRPM | BODY MASS INDEX: 16.69 KG/M2

## 2024-07-15 LAB
ANION GAP SERPL CALCULATED.3IONS-SCNC: 8 MMOL/L (ref 7–16)
BUN SERPL-MCNC: 18 MG/DL (ref 6–23)
CALCIUM SERPL-MCNC: 8.7 MG/DL (ref 8.6–10.2)
CHLORIDE SERPL-SCNC: 107 MMOL/L (ref 98–107)
CO2 SERPL-SCNC: 25 MMOL/L (ref 22–29)
CREAT SERPL-MCNC: 0.7 MG/DL (ref 0.5–1)
GFR, ESTIMATED: 83 ML/MIN/1.73M2
GLUCOSE SERPL-MCNC: 84 MG/DL (ref 74–99)
HBA1C MFR BLD: 5.2 % (ref 4–5.6)
POTASSIUM SERPL-SCNC: 4.2 MMOL/L (ref 3.5–5)
PREALB SERPL-MCNC: 9 MG/DL (ref 20–40)
SODIUM SERPL-SCNC: 140 MMOL/L (ref 132–146)

## 2024-07-15 PROCEDURE — 99231 SBSQ HOSP IP/OBS SF/LOW 25: CPT | Performed by: SURGERY

## 2024-07-15 PROCEDURE — 2580000003 HC RX 258: Performed by: INTERNAL MEDICINE

## 2024-07-15 PROCEDURE — 6370000000 HC RX 637 (ALT 250 FOR IP): Performed by: INTERNAL MEDICINE

## 2024-07-15 PROCEDURE — 2700000000 HC OXYGEN THERAPY PER DAY

## 2024-07-15 PROCEDURE — 84134 ASSAY OF PREALBUMIN: CPT

## 2024-07-15 PROCEDURE — 83036 HEMOGLOBIN GLYCOSYLATED A1C: CPT

## 2024-07-15 PROCEDURE — 93246 EXT ECG>7D<15D RECORDING: CPT

## 2024-07-15 PROCEDURE — 94640 AIRWAY INHALATION TREATMENT: CPT

## 2024-07-15 PROCEDURE — 97530 THERAPEUTIC ACTIVITIES: CPT

## 2024-07-15 PROCEDURE — 80048 BASIC METABOLIC PNL TOTAL CA: CPT

## 2024-07-15 PROCEDURE — 6360000002 HC RX W HCPCS: Performed by: INTERNAL MEDICINE

## 2024-07-15 PROCEDURE — 6370000000 HC RX 637 (ALT 250 FOR IP): Performed by: SURGERY

## 2024-07-15 PROCEDURE — 99239 HOSP IP/OBS DSCHRG MGMT >30: CPT | Performed by: STUDENT IN AN ORGANIZED HEALTH CARE EDUCATION/TRAINING PROGRAM

## 2024-07-15 RX ADMIN — ATORVASTATIN CALCIUM 20 MG: 20 TABLET, FILM COATED ORAL at 08:12

## 2024-07-15 RX ADMIN — PANTOPRAZOLE SODIUM 40 MG: 40 TABLET, DELAYED RELEASE ORAL at 06:04

## 2024-07-15 RX ADMIN — APIXABAN 2.5 MG: 2.5 TABLET, FILM COATED ORAL at 08:12

## 2024-07-15 RX ADMIN — SODIUM CHLORIDE, PRESERVATIVE FREE 10 ML: 5 INJECTION INTRAVENOUS at 08:14

## 2024-07-15 RX ADMIN — OXYCODONE HYDROCHLORIDE AND ACETAMINOPHEN 1 TABLET: 5; 325 TABLET ORAL at 15:43

## 2024-07-15 RX ADMIN — CEFEPIME 2000 MG: 2 INJECTION, POWDER, FOR SOLUTION INTRAVENOUS at 16:10

## 2024-07-15 RX ADMIN — CEFEPIME 2000 MG: 2 INJECTION, POWDER, FOR SOLUTION INTRAVENOUS at 04:15

## 2024-07-15 RX ADMIN — AMIODARONE HYDROCHLORIDE 200 MG: 200 TABLET ORAL at 08:12

## 2024-07-15 RX ADMIN — ALBUTEROL SULFATE 2.5 MG: 2.5 SOLUTION RESPIRATORY (INHALATION) at 07:52

## 2024-07-15 RX ADMIN — OXYCODONE HYDROCHLORIDE AND ACETAMINOPHEN 1 TABLET: 5; 325 TABLET ORAL at 08:12

## 2024-07-15 ASSESSMENT — PAIN SCALES - GENERAL: PAINLEVEL_OUTOF10: 7

## 2024-07-15 ASSESSMENT — PAIN DESCRIPTION - LOCATION: LOCATION: CHEST

## 2024-07-15 NOTE — PROGRESS NOTES
Hospitalist Progress Note      SYNOPSIS: Patient admitted on 2024 for Pacemaker infection (HCC)  89-year-old female patient with medical history of ALS, COPD, hyperlipidemia, hypertension, hearing impairment, PPM due to sinus sick syndrome and A-fib and pauses, long-term anticoagulation with Eliquis who presented to the ER for concern of drainage around the site of the pacemaker.  PPM was implanted on 2024 at [facility].  ER evaluation temperature 99 F /52 HR 62 RR 16 SpO2 92% in room air.  Labs CBC pending, potassium 5.4, BUN 31, creatinine 0.8.  Blood culture and wound culture sent CT scan of the chest was done which showed mild basilar peribronchial thickening and patchy tree-in-bud infiltrate suggesting mild pneumonitis likely aspiration related.  patient was given Ancef and vancomycin.  Electrophysiology and ID consulted  Wound culture growing Pseudomonas, switched antibiotic from Ancef to Zosyn   s/p removal of pacemaker, BELINDA did not show any vegetation    SUBJECTIVE:  Stable overnight. No other overnight issues reported.   Patient seen and examined at bedside today a.m. states that she feels much better, much more awake, alert.  She does have some pain in her left chest around pacemaker removal area  Records reviewed.         Temp (24hrs), Av.5 °F (36.4 °C), Min:97.2 °F (36.2 °C), Max:97.7 °F (36.5 °C)    DIET: ADULT DIET; Regular; Low Sodium (2 gm); No Caffeine  CODE: Limited    Intake/Output Summary (Last 24 hours) at 2024 1101  Last data filed at 2024 1542  Gross per 24 hour   Intake 525 ml   Output 5 ml   Net 520 ml       Review of Systems  All bolded are positive; please see HPI  General:  Fever, chills, diaphoresis, fatigue, malaise, night sweats, weight loss  Psychological:  Anxiety, disorientation, hallucinations.  ENT:  Epistaxis, headaches, vertigo, visual changes.  Cardiovascular:  Chest pain, irregular heartbeats, palpitations, paroxysmal 
    Hospitalist Progress Note      SYNOPSIS: Patient admitted on 2024 for Pacemaker infection (HCC)  89-year-old female patient with medical history of ALS, COPD, hyperlipidemia, hypertension, hearing impairment, PPM due to sinus sick syndrome and A-fib and pauses, long-term anticoagulation with Eliquis who presented to the ER for concern of drainage around the site of the pacemaker.  PPM was implanted on 2024 at [facility].  ER evaluation temperature 99 F /52 HR 62 RR 16 SpO2 92% in room air.  Labs CBC pending, potassium 5.4, BUN 31, creatinine 0.8.  Blood culture and wound culture sent CT scan of the chest was done which showed mild basilar peribronchial thickening and patchy tree-in-bud infiltrate suggesting mild pneumonitis likely aspiration related.  patient was given Ancef and vancomycin.  Electrophysiology and ID consulted  Wound culture growing Pseudomonas, switched antibiotic from Ancef to Zosyn   s/p removal of pacemaker, BELINDA did not show any vegetation    SUBJECTIVE:  Overnight patient's heart rate went up to 170 and monitor, patient is symptomatic, received 5 mg IV metoprolol  Patient seen and examined at bedside today a.m., patient states that she feels tired.  Her heart rate was on 60s  Records reviewed.         Temp (24hrs), Av.3 °F (36.3 °C), Min:97.2 °F (36.2 °C), Max:97.4 °F (36.3 °C)    DIET: ADULT DIET; Regular; Low Sodium (2 gm); No Caffeine  CODE: Limited    Intake/Output Summary (Last 24 hours) at 2024 1047  Last data filed at 2024 0633  Gross per 24 hour   Intake 60 ml   Output --   Net 60 ml       Review of Systems  All bolded are positive; please see HPI  General:  Fever, chills, diaphoresis, fatigue, malaise, night sweats, weight loss  Psychological:  Anxiety, disorientation, hallucinations.  ENT:  Epistaxis, headaches, vertigo, visual changes.  Cardiovascular:  Chest pain, irregular heartbeats, palpitations, paroxysmal nocturnal 
    Hospitalist Progress Note      SYNOPSIS: Patient admitted on 2024 for Pacemaker infection, initial encounter (HCC)  89-year-old female patient with medical history of ALS, COPD, hyperlipidemia, hypertension, hearing impairment, PPM due to sinus sick syndrome and A-fib and pauses, long-term anticoagulation with Eliquis who presented to the ER for concern of drainage around the site of the pacemaker.  PPM was implanted on 2024 at [facility].  ER evaluation temperature 99 F /52 HR 62 RR 16 SpO2 92% in room air.  Labs CBC pending, potassium 5.4, BUN 31, creatinine 0.8.  Blood culture and wound culture sent CT scan of the chest was done which showed mild basilar peribronchial thickening and patchy tree-in-bud infiltrate suggesting mild pneumonitis likely aspiration related.  patient was given Ancef and vancomycin.  Electrophysiology and ID consulted  Wound culture growing Pseudomonas, switched antibiotic from Ancef to Zosyn    SUBJECTIVE:  Stable overnight. No other overnight issues reported.   Patient seen and examined at bedside today a.m., has been kept n.p.o. for pacemaker removal, denies any fever or chills.  Records reviewed.         Temp (24hrs), Av.1 °F (36.7 °C), Min:97.7 °F (36.5 °C), Max:98.3 °F (36.8 °C)    DIET: Diet NPO  CODE: Limited    Intake/Output Summary (Last 24 hours) at 2024 1123  Last data filed at 7/10/2024 1802  Gross per 24 hour   Intake 120 ml   Output --   Net 120 ml       Review of Systems  All bolded are positive; please see HPI  General:  Fever, chills, diaphoresis, fatigue, malaise, night sweats, weight loss  Psychological:  Anxiety, disorientation, hallucinations.  ENT:  Epistaxis, headaches, vertigo, visual changes.  Cardiovascular:  Chest pain, irregular heartbeats, palpitations, paroxysmal nocturnal dyspnea.  Respiratory:  Shortness of breath, coughing, sputum production, hemoptysis, wheezing, orthopnea.  Gastrointestinal:  Nausea, vomiting, 
  Physician Progress Note      PATIENT:               MARLA THOMPSON  CSN #:                  [id number]  :                       1935  ADMIT DATE:       2024 3:51 PM  DISCH DATE:  RESPONDING  PROVIDER #:        Rashaun Call MD          QUERY TEXT:    Patient with atrial fibrillation and is maintained on Eliquis. If possible,   please document in progress notes and discharge summary if you are evaluating   and/or treating any of the following:?  ?  The medical record reflects the following:  Risk Factors: 89yr old female, HTN  Clinical Indicators: A-fib  Treatment: Eliquis    Thank You,  Cici Barnes RN, BSN, CRCR, Clinical Documentation Improvement  Options provided:  -- Secondary hypercoagulable state in a patient with atrial fibrillation  -- Other - I will add my own diagnosis  -- Disagree - Not applicable / Not valid  -- Disagree - Clinically unable to determine / Unknown  -- Refer to Clinical Documentation Reviewer    PROVIDER RESPONSE TEXT:    Afib on anticoagulation to prevent stroke    Query created by: Cici Barnes on 7/15/2024 2:00 PM      Electronically signed by:  Rashaun Call MD 7/15/2024 2:14 PM          
4 Eyes Skin Assessment     NAME:  Shauna Gaines  YOB: 1935  MEDICAL RECORD NUMBER:  [id number]    The patient is being assessed for  Admission    I agree that at least one RN has performed a thorough Head to Toe Skin Assessment on the patient. ALL assessment sites listed below have been assessed.      Areas assessed by both nurses:    Head, Face, Ears, Shoulders, Back, Chest, Arms, Elbows, Hands, Sacrum. Buttock, Coccyx, Ischium, Legs. Feet and Heels, and Under Medical Devices         Does the Patient have a Wound? Yes wound(s) were present on assessment. LDA wound assessment was Initiated and completed by RN       Johnnie Prevention initiated by RN: No  Wound Care Orders initiated by RN: Yes    Pressure Injury (Stage 3,4, Unstageable, DTI, NWPT, and Complex wounds) if present, place Wound referral order by RN under : No    New Ostomies, if present place, Ostomy referral order under : No     Nurse 1 eSignature: Electronically signed by Macie Andrew RN on 7/10/24 at 6:31 AM EDT    **SHARE this note so that the co-signing nurse can place an eSignature**    Nurse 2 eSignature: Electronically signed by Juliano Mack RN on 7/10/24 at 6:43 AM EDT  
Antibiotic Extended Infusion Policy     This patient is on medication that requires renal, weight, and/or indication dose adjustment.      Date Body Weight IBW  Adjusted BW SCr  CrCl Dialysis status BMI   7/12/2024 38.6 kg (85 lb) Ideal body weight: 50.4 kg (111 lb 3.2 oz) Serum creatinine: 0.6 mg/dL 07/12/24 0704  Estimated creatinine clearance: 39 mL/min N/a Body mass index is 16.6 kg/m².       Pharmacy has dose-adjusted the following medication(s):    Ordered Medication: Cefepime 1000mg q8h     Order Changed/converted to: Cefepime 2000mg q12h    These changes were made per protocol according to the [facility]   Automatic Extended Infusion Dose Adjustment Policy.     *Please note this dose may need readjusted if patient's condition changes.    Please contact pharmacy with any questions regarding these changes.    Skip Fontenot RPH  7/12/2024  3:01 PM    
CHG double lumen power picc Placement 7/14/2024    Product number: [id number]   Lot Number: [id number]      Ultrasound: yes    Right Brachial vein:                Upper Arm Circumference: (CM) 23    Size:(FR)/GUAGE 5.5/32 cm    Exposed Length: (CM) 3    Internal Length: (CM) 29   Cut: (CM) 23   Vein Measurement: 0.52 cm              Lidocaine Given: yes lidocaine 1% (from picc kit)               Procedure performed by FANY Redding RN  7/14/2024  11:45 AM      CHG double lumen power picc inserted using the VPS guidance system. Unable to obtain bullseye. CXR ordered for picc placement.                     
Comprehensive Nutrition Assessment    Type and Reason for Visit:  Initial, Wound, Consult    Nutrition Recommendations/Plan:   Pt. Currently NPO for testing. Recommend and start ONS : Ensure + TID w/ diet to promote nutrient/PO intake and wound healing. Will continue to monitor.     Malnutrition Assessment:  Malnutrition Status:  Severe malnutrition (07/11/24 1006)    Context:  Chronic Illness     Findings of the 6 clinical characteristics of malnutrition:  Energy Intake:  75% or less estimated energy requirements for 1 month or longer (In setting of increased nutrient needs)  Weight Loss:  Unable to assess (2/2 lack of updated CBW to trend with weight hx)     Body Fat Loss:  Severe body fat loss Orbital, Triceps, Fat Overlying Ribs, Buccal region   Muscle Mass Loss:  Severe muscle mass loss Temples (temporalis), Clavicles (pectoralis & deltoids), Calf (gastrocnemius)  Fluid Accumulation:  No significant fluid accumulation     Strength:  Not Performed    Nutrition Assessment:    Pt. admitted for pacemaker site/pocket infection. ID following. PMHx if HTN,COPD, s/p pacemaker (03/2024). Hx of chronic RLE wound. Hx of malnutrition. Pt. does meet criteria for severe malnutrition. Currently NPO for testing. Will start ONS w/ diet to promote nutrient intake/wound healing and monitor.    Nutrition Related Findings:    A&Ox3, +I/O, abd/BS WDL, no edema, labs reviewed, Wound Type: Multiple, Open Wounds (Right lower leg: traumatic x1,  Left chest wound  x1)       Current Nutrition Intake & Therapies:    Average Meal Intake: %  Average Supplements Intake: None Ordered  Diet NPO    Anthropometric Measures:  Height: 152.4 cm (5')  Ideal Body Weight (IBW): 100 lbs (45 kg)       Current Body Weight: 38.6 kg (85 lb 1.6 oz) (7/09 stated ; UTO measured CBW at this time), 85.1 % IBW. Weight Source: Not Specified  Current BMI (kg/m2): 16.6  Usual Body Weight: 47.3 kg (104 lb 4.4 oz) (7/21/23 x ~1 year)  % Weight Change 
Dicharge given   
[facility]  [facility]  Progress Note      Chief Complaint   Patient presents with    Wound Check     Pacemaker put in march, drainage around site. Wound on right shin that is healing.    Wound Infection       SUBJECTIVE:    Patient is tolerating medications. No reported adverse drug reactions.  No nausea, vomiting, diarrhea.    Review of systems:  As stated above in the chief complaint, otherwise negative.    Medications:  Scheduled Meds:   amiodarone  200 mg Oral BID    magnesium oxide  200 mg Oral Daily    piperacillin-tazobactam  4,500 mg IntraVENous Q8H    sodium chloride flush  5-40 mL IntraVENous 2 times per day    vancomycin (VANCOCIN) intermittent dosing (placeholder)   Other RX Placeholder    sodium zirconium cyclosilicate  10 g Oral Once    albuterol  2.5 mg Nebulization Q4H While awake    docusate sodium  100 mg Oral Nightly    [Held by provider] apixaban  2.5 mg Oral BID    [Held by provider] metoprolol succinate  50 mg Oral BID    pantoprazole  40 mg Oral QAM AC    atorvastatin  20 mg Oral Daily     Continuous Infusions:   sodium chloride       PRN Meds:lidocaine, sodium chloride flush, sodium chloride, ondansetron **OR** ondansetron, polyethylene glycol, acetaminophen **OR** acetaminophen    OBJECTIVE:  /68   Pulse (!) 107   Temp 98.3 °F (36.8 °C)   Resp 18   Ht 1.524 m (5')   Wt 38.6 kg (85 lb)   SpO2 95%   BMI 16.60 kg/m²   Temp  Av °F (36.7 °C)  Min: 97.7 °F (36.5 °C)  Max: 98.3 °F (36.8 °C)  General Appearance:    Alert, cooperative, no acute distress.   Head:    Normocephalic, without obvious abnormality, atraumatic   Eyes:    PERRL, conjunctiva/corneas clear, EOM's intact, no pallor  or icterus.   Ears:    No obvious deformity or drainage.   Nose:   Nares normal, septum midline, mucosa normal,   Throat:   Lips, mucosa, and tongue normal; teeth and gums normal   Neck:   Supple, symmetrical, trachea midline, no adenopathy;        thyroid:  No 
IV team messaged via perfect serve for PICC order placed.  
[facility]  [facility]  Progress Note      Chief Complaint   Patient presents with    Wound Check     Pacemaker put in march, drainage around site. Wound on right shin that is healing.    Wound Infection       SUBJECTIVE:    Patient is tolerating medications. No reported adverse drug reactions.  No nausea, vomiting, diarrhea.    Review of systems:  As stated above in the chief complaint, otherwise negative.    Medications:  Scheduled Meds:   cefepime  2,000 mg IntraVENous Q12H    amiodarone  200 mg Oral BID    magnesium oxide  200 mg Oral Daily    sodium chloride flush  5-40 mL IntraVENous 2 times per day    vancomycin (VANCOCIN) intermittent dosing (placeholder)   Other RX Placeholder    sodium zirconium cyclosilicate  10 g Oral Once    albuterol  2.5 mg Nebulization Q4H While awake    docusate sodium  100 mg Oral Nightly    apixaban  2.5 mg Oral BID    pantoprazole  40 mg Oral QAM AC    atorvastatin  20 mg Oral Daily     Continuous Infusions:   sodium chloride       PRN Meds:ketorolac, morphine, oxyCODONE-acetaminophen, sodium chloride flush, sodium chloride, ondansetron **OR** ondansetron, polyethylene glycol, acetaminophen **OR** acetaminophen    OBJECTIVE:  BP (!) 123/59   Pulse 69   Temp 97.2 °F (36.2 °C) (Temporal)   Resp 18   Ht 1.524 m (5')   Wt 38.6 kg (85 lb)   SpO2 92%   BMI 16.60 kg/m²   Temp  Av.3 °F (36.3 °C)  Min: 97.2 °F (36.2 °C)  Max: 97.4 °F (36.3 °C)  General Appearance:    Alert, cooperative, no acute distress.   Head:    Normocephalic, without obvious abnormality, atraumatic   Eyes:    PERRL, conjunctiva/corneas clear, EOM's intact, no pallor  or icterus.   Ears:    No obvious deformity or drainage.   Nose:   Nares normal, septum midline, mucosa normal,   Throat:   Lips, mucosa, and tongue normal; teeth and gums normal   Neck:   Supple, symmetrical, trachea midline, no adenopathy;        thyroid:  No enlargement/tenderness/nodules; no carotid    bruit 
[facility]  Inpatient progress note  PATIENT: Shauna Gaines  MEDICAL RECORD NUMBER: [id number]  DATE OF SERVICE:  7/12/2024  ATTENDING ELECTROPHYSIOLOGIST: Chai Rivero MD  PRIMARY ELECTROPHYSIOLOGIST: Chai Rivero MD  REFERRING PHYSICIAN:Ross Mercer MD  CHIEF COMPLAINT: Drainage at pacemaker site    HPI: This is a 89 y.o. female with a history of   Patient Active Problem List   Diagnosis    Choledocholithiasis    Dilated cbd, acquired    Cellulitis of right leg    Cellulitis    Wound of right leg    Severe protein-calorie malnutrition (HCC)    Acute respiratory failure with hypoxia and hypercapnia (HCC)    Acute respiratory failure with hypoxia (HCC)    Chest wall abscess    Chronic ulcer of right leg with fat layer exposed (HCC)    Pacemaker infection (HCC)    Rupture of operation wound   who presents to the hospital complaining of drainage at pacemaker site. The patient has history of paroxysmal atrial fibrillation diagnosed on 3/15/24, sinus node dysfunction status post pacemaker implantation on 3/25/24, valvular heart disease, hypertension, hyperlipidemia, COPD and impaired hearing. The patient was admitted to [facility] in March 2024 due to respiratory infection and was diagnosed with sinus node dysfunction due to AF with pauses. She subsequently underwent [employer] dual chamber pacemaker implantation on 3/25/24. She was admitted to [facility] in June 2023 due to sepsis secondary to right lower extremity wound infection and cellulitis status post incision and drainage of the right leg abscess on 6/5/34. Culture of the wound showed pan sensitivity of Enterococcus Faecalis.She was discharged to NH with Zyvox.She was admitted at [facility] in June 2024 due to AF with RVR. Echo on 6/15/24 showed LV EF 55-60%. She was discharged home with Toprol XL 50 mg BID and Eliquis. The patient noticed scab at pacemaker site 2-3 weeks ago and when it came 
New EP consult placed to Dr. Rivero  
New ID consult placed to Dr. Rogers  
Nurse to nurse report called to DAYNE Caurso by CATHY Talbot RN  
OT SESSION ATTEMPT     Date:2024  Patient Name: Shauna Gaines  MRN: [id number]  : 1935  Room: [address] [place] [address]A     Occupational therapy orders received/chart review completed and OT session attempted this date:    [] unavailable due to other medical staff currently with pt   [] on hold, await MRI/ neurosurgical recommendations.   [] on hold per nursing staff secondary to lab / radiology results    [] declined Occupational Therapy  this date due to ___.  Benefits of participation in therapy reviewed with pt.    [x] off unit   [] Other:     Will reattempt OT eval at a later time/date.    Eliane Lara OTR/L; [id number]    
Occupational Therapy  OCCUPATIONAL THERAPY INITIAL EVALUATION    [facility]  [address], [place]      Date:2024                                                Patient Name: Shauna Gaines  MRN: [id number]  : 1935  Room: [address] [place] [address]    Evaluating OT:Radha Alvares OTR/L #[id number]    Referring Provider:  Sharla Chavira MD  Specific Provider Orders/Date: OT eval and treat 07/10/24     Diagnosis: Dehiscence of operative wound, initial encounter [T81.31XA]  Pacemaker infection, initial encounter ([facility]) [T82.7XXA]   Pt admitted to hospital on 24 for pacemaker site wound check    Surgery / Procedure: pacemaker removal on     BELINDA on     Pertinent Medical History:  has a past medical history of Aortic valve stenosis, Asthma, COPD (chronic obstructive pulmonary disease) ([facility]), COVID, Hearing aid worn, Heart murmur, Hyperlipidemia, and Hypertension. R LE wound - s/p I&D on  (WBAT R LE)  Pacemaker implantation on 3/25/24     Precautions:  Fall Risk, pacemaker removed on     Assessment of current deficits    [x] Functional mobility  [x]ADLs  [x] Strength               []Cognition    [x] Functional transfers   [x] IADLs         [x] Safety Awareness   [x]Endurance    [] Fine Coordination              [x] Balance      [] Vision/perception   []Sensation     []Gross Motor Coordination  [] ROM  [] Delirium                   [] Motor Control     OT PLAN OF CARE   OT POC based on physician orders, patient diagnosis and results of clinical assessment    Frequency/Duration 1-3 days/wk for 2 weeks PRN   Specific OT Treatment Interventions to include:   * Instruction/training on adapted ADL techniques and AE recommendations to increase functional independence within precautions       * Training on energy conservation strategies, correct breathing pattern and techniques to improve independence/tolerance for self-care routine  * Functional 
Patients HR  ST asymptomatic. Cardiology Dr. Proctor notified and order for lopressor 5 mg IV ordered. Will continue to monitor.  
Pharmacy Consultation Note  (Antibiotic Dosing and Monitoring)    Initial consult date: 7/9/2024  Consulting physician/provider: Milanes Marino, Maria, MD.   Drug: Vancomycin  Indication: Surgical Site Infection     Age/  Gender Height Weight IBW  Allergy Information   89 y.o./female 152.4 cm (5') 38.6 kg (85 lb)     Ideal body weight: 50.4 kg (111 lb 3.2 oz)   Adhesive tape      Renal Function:  Recent Labs     07/09/24  1700   BUN 31*   CREATININE 0.8     Assessment:  Patient is a 89 y.o. female who has been initiated on vancomycin for pacemaker site infection/abscess.   Estimated Creatinine Clearance: 29 mL/min (based on SCr of 0.8 mg/dL).  To dose vancomycin, pharmacy will be dosing based off of levels.   Vanco 500mg x1 ordered on 7/9.     Plan:  Vanco random level ordered for 7/10 AM.   Will re-dose Vanco based on random level tomorrow.   Will continue to monitor renal function   Pharmacy to follow      Tina Gandhi, PharmD, BCIDP, BCPS 7/9/2024 8:20 PM  [phone]  
Pharmacy Consultation Note  (Antibiotic Dosing and Monitoring)    Initial consult date: 7/9/2024  Consulting physician/provider: Milanes Marino, Maria, MD.   Drug: Vancomycin  Indication: Surgical Site Infection     Age/  Gender Height Weight IBW  Allergy Information   89 y.o./female 152.4 cm (5') 38.6 kg (85 lb)     Ideal body weight: 50.4 kg (111 lb 3.2 oz)   Adhesive tape      Renal Function:  Recent Labs     07/09/24  1700 07/09/24  2214 07/10/24  0643   BUN 31* 27* 22   CREATININE 0.8 1.0 0.7       Vancomycin Administration Times:  Recent vancomycin administrations                     vancomycin (VANCOCIN) 500 mg in sodium chloride 0.9 % 100 mL IVPB (mg) 500 mg New Bag 07/09/24 1433                  Assessment:  Patient is a 89 y.o. female who has been initiated on vancomycin for pacemaker site infection/abscess.   Estimated Creatinine Clearance: 33 mL/min (based on SCr of 0.7 mg/dL).  To dose vancomycin, pharmacy will be dosing based off of levels.   7/10: Random vancomycin level <4.0 mg/dL, only 7 hours post dose, unexpected.     Plan:  Vancomycin 1000 mg IV once.    Check random level tonight.   Will continue to monitor renal function   Pharmacy to follow      Nayeli Baum PharmD, BCPS 7/10/2024 8:17 AM    
Pharmacy Consultation Note  (Antibiotic Dosing and Monitoring)    Initial consult date: 7/9/24  Consulting physician/provider: Milanes Marino, Maria, MD.   Drug: Vancomycin  Indication: Surgical Site Infection     Age/  Gender Height Weight IBW  Allergy Information   89 y.o./female 152.4 cm (5') 38.6 kg (85 lb)     Ideal body weight: 50.4 kg (111 lb 3.2 oz)   Adhesive tape      Renal Function:  Recent Labs     07/09/24  1700 07/09/24  2214 07/10/24  0643   BUN 31* 27* 22   CREATININE 0.8 1.0 0.7       Intake/Output Summary (Last 24 hours) at 7/11/2024 0008  Last data filed at 7/10/2024 1802  Gross per 24 hour   Intake 120 ml   Output --   Net 120 ml       Vancomycin Monitoring:  Trough:  No results for input(s): \"VANCOTROUGH\" in the last 72 hours.  Random:    Recent Labs     07/10/24  0643 07/10/24  2020   VANCORANDOM <4.0* 11.1       Vancomycin Administration Times:  Recent vancomycin administrations                     vancomycin (VANCOCIN) 1,000 mg in sodium chloride 0.9 % 250 mL IVPB (Sckj0Sge) (mg) 1,000 mg New Bag 07/11/24 0005    vancomycin (VANCOCIN) 1,000 mg in sodium chloride 0.9 % 250 mL IVPB (Hffp2Opi) (mg) 1,000 mg New Bag 07/10/24 1132    vancomycin (VANCOCIN) 500 mg in sodium chloride 0.9 % 100 mL IVPB (mg) 500 mg New Bag 07/09/24 2255                    Assessment:  Patient is a 89 y.o. female who has been initiated on vancomycin  Estimated Creatinine Clearance: 33 mL/min (based on SCr of 0.7 mg/dL).  To dose vancomycin, pharmacy will be utilizing [employer] calculation software for goal AUC/CRISS 400-600 mg/L-hr (predicted AUC/CRISS = 498, Tr =13.2 mcg/mL)    Plan:  Will continue vancomycin 1000 mg IV every 24 hours  Will check random level 7/12 @0600   Will continue to monitor renal function   Pharmacy to follow      Carlitos Ayala RPH ,PharmD,  7/11/2024 12:10 AM      
Pharmacy was consulted to dose/monitor vancomycin which has now been discontinued. Clinical Pharmacy will sign off at this time.    Justus Deluna PharmD, BCCCP 7/11/2024 7:11 PM    
Physical Therapy    Pt on PT caseload for follow up. Pt off unit at this time. Will follow and reattempt as able.     Janette Sue PT, DPT  [id number]     
Physical Therapy  Physical Therapy Initial Assessment     Name: Shauna Gaines  : 1935  MRN: [id number]      Date of Service: 7/10/2024    Evaluating PT:  Janette Sue PT, DPT [id number]    Room #:  7417/7417-A  Diagnosis:  Dehiscence of operative wound, initial encounter [T81.31XA]  Pacemaker infection, initial encounter ([facility]) [T82.7XXA]  PMHx/PSHx:    Past Medical History:   Diagnosis Date    Aortic valve stenosis     Asthma     COPD (chronic obstructive pulmonary disease) ([facility])     COVID 2022    Hearing aid worn     Heart murmur     Hyperlipidemia     Hypertension       Procedure/Surgery:  none this admission  Precautions:  Falls  Equipment Needs:  none, pt has cane and WW    SUBJECTIVE:    Pt lives with daughter in a 2 story home with 4 stairs to enter and no rail.  Bed is on 1st floor and bath is on 1st floor.  Pt ambulated with cane vs WW PTA.    OBJECTIVE:   Initial Evaluation  Date: 7/10/24 Treatment Short Term/ Long Term   Goals   AM-PAC 6 Clicks      Was pt agreeable to Eval/treatment? yes     Does pt have pain? No c/o pain     Bed Mobility  Rolling: NT  Supine to sit: NT  Sit to supine: NT  Scooting: NT  Rolling: Independent   Supine to sit: Independent   Sit to supine: Independent   Scooting: Independent    Transfers Sit to stand: SBA  Stand to sit: SBA  Stand pivot: SBA with WW  Sit to stand: Independent   Stand to sit: Independent   Stand pivot: Mod I with WW   Ambulation    30 feet with WW SBA  >200 feet with WW Mod I    Stair negotiation: ascended and descended  NT  4 steps with no rail Supervision    ROM BUE:  Defer to OT note  BLE:  WFL     Strength BUE:  Defer to OT note  BLE:  3+/5  WFL   Balance Sitting EOB:  NT  Dynamic Standing:  SBA with WW  Sitting EOB:  Independent   Dynamic Standing:  Mod I with WW     Pt is A & O x 4  Sensation:  denies abnormalities  Edema:  none noted      Patient education  Pt educated on role of PT, safety during mobility    Patient response to 
[facility]  [facility]  Progress Note      Chief Complaint   Patient presents with    Wound Check     Pacemaker put in march, drainage around site. Wound on right shin that is healing.    Wound Infection       SUBJECTIVE:    Patient is tolerating medications. No reported adverse drug reactions.  No nausea, vomiting, diarrhea.    Review of systems:  As stated above in the chief complaint, otherwise negative.    Medications:  Scheduled Meds:   amiodarone  200 mg Oral BID    magnesium oxide  200 mg Oral Daily    piperacillin-tazobactam  4,500 mg IntraVENous Q8H    sodium chloride flush  5-40 mL IntraVENous 2 times per day    vancomycin (VANCOCIN) intermittent dosing (placeholder)   Other RX Placeholder    sodium zirconium cyclosilicate  10 g Oral Once    albuterol  2.5 mg Nebulization Q4H While awake    docusate sodium  100 mg Oral Nightly    [Held by provider] apixaban  2.5 mg Oral BID    [Held by provider] metoprolol succinate  50 mg Oral BID    pantoprazole  40 mg Oral QAM AC    atorvastatin  20 mg Oral Daily     Continuous Infusions:   sodium chloride       PRN Meds:morphine, oxyCODONE-acetaminophen, sodium chloride flush, sodium chloride, ondansetron **OR** ondansetron, polyethylene glycol, acetaminophen **OR** acetaminophen    OBJECTIVE:  BP (!) 126/57   Pulse (!) 106   Temp 97.6 °F (36.4 °C) (Temporal)   Resp 16   Ht 1.524 m (5')   Wt 38.6 kg (85 lb)   SpO2 92%   BMI 16.60 kg/m²   Temp  Av.5 °F (36.4 °C)  Min: 97.2 °F (36.2 °C)  Max: 97.7 °F (36.5 °C)  General Appearance:    Alert, cooperative, no acute distress.   Head:    Normocephalic, without obvious abnormality, atraumatic   Eyes:    PERRL, conjunctiva/corneas clear, EOM's intact, no pallor  or icterus.   Ears:    No obvious deformity or drainage.   Nose:   Nares normal, septum midline, mucosa normal,   Throat:   Lips, mucosa, and tongue normal; teeth and gums normal   Neck:   Supple, symmetrical, trachea midline, 
RT Nebulizer Bronchodilator Protocol Note    There is a bronchodilator order in the chart from a provider indicating to follow the RT Bronchodilator Protocol and there is an “Initiate RT Bronchodilator Protocol” order as well (see protocol at bottom of note).    CXR Findings:  7/10/2024 FINDINGS:  Left-sided pacemaker is present.  No obvious subcutaneous gas associated with  the chest wall.  No airspace opacity or pleural effusion.  No pneumothorax.  The heart is normal in size.       The findings from the last RT Protocol Assessment were as follows:  Smoking: (P) Chronic pulmonary disease  Respiratory Pattern: (P) Regular pattern and RR 12-20 bpm  Breath Sounds: (P) Slightly diminished and/or crackles  Cough: (P) Strong, productive  Indication for Bronchodilator Therapy: (P) On home bronchodilators (bid nebs at home)  Bronchodilator Assessment Score: (P) 5    Aerosolized bronchodilator medication orders have been revised according to the RT Nebulizer Bronchodilator Protocol below.    Respiratory Therapist to perform RT Therapy Protocol Assessment initially then follow the protocol.  Repeat RT Therapy Protocol Assessment PRN for score 0-3 or on second treatment, BID, and PRN for scores above 3.    No Indications - adjust the frequency to every 6 hours PRN wheezing or bronchospasm, if no treatments needed after 48 hours then discontinue using Per Protocol order mode.     If indication present, adjust the RT bronchodilator orders based on the Bronchodilator Assessment Score as indicated below.  If a patient is on this medication at home then do not decrease Frequency below that used at home.    0-3 - enter or revise RT bronchodilator order(s) to equivalent RT Bronchodilator order with Frequency of every 4 hours PRN for wheezing or increased work of breathing using Per Protocol order mode.       4-6 - enter or revise RT Bronchodilator order(s) to two equivalent RT bronchodilator orders with one order with BID Frequency 
[facility]  [facility]  Progress Note      Chief Complaint   Patient presents with    Wound Check     Pacemaker put in march, drainage around site. Wound on right shin that is healing.    Wound Infection       SUBJECTIVE:    Patient is tolerating medications. No reported adverse drug reactions.  No nausea, vomiting, diarrhea.    Review of systems:  As stated above in the chief complaint, otherwise negative.    Medications:  Scheduled Meds:   albuterol  2.5 mg Nebulization BID RT    sodium chloride flush  5-40 mL IntraVENous 2 times per day    lidocaine 1 % injection  50 mg IntraDERmal Once    cefepime  2,000 mg IntraVENous Q12H    amiodarone  200 mg Oral BID    sodium chloride flush  5-40 mL IntraVENous 2 times per day    vancomycin (VANCOCIN) intermittent dosing (placeholder)   Other RX Placeholder    sodium zirconium cyclosilicate  10 g Oral Once    docusate sodium  100 mg Oral Nightly    apixaban  2.5 mg Oral BID    pantoprazole  40 mg Oral QAM AC    atorvastatin  20 mg Oral Daily     Continuous Infusions:   sodium chloride      sodium chloride       PRN Meds:albuterol, ketorolac, morphine, oxyCODONE-acetaminophen, sodium chloride flush, sodium chloride, sodium chloride flush, sodium chloride, ondansetron **OR** ondansetron, polyethylene glycol, acetaminophen **OR** acetaminophen    OBJECTIVE:  BP (!) 148/60   Pulse 70   Temp 98 °F (36.7 °C) (Temporal)   Resp 18   Ht 1.524 m (5')   Wt 38.6 kg (85 lb)   SpO2 93%   BMI 16.60 kg/m²   Temp  Av.9 °F (36.6 °C)  Min: 97.6 °F (36.4 °C)  Max: 98 °F (36.7 °C)  General Appearance:    Alert, cooperative, no acute distress.   Head:    Normocephalic, without obvious abnormality, atraumatic   Eyes:    PERRL, conjunctiva/corneas clear, EOM's intact, no pallor  or icterus.   Ears:    No obvious deformity or drainage.   Nose:   Nares normal, septum midline, mucosa normal,   Throat:   Lips, mucosa, and tongue normal; teeth and gums normal 
VASCULAR SURGERY PROGRESS NOTE    Pt is being seen in f/u today regarding pacemaker site infection     SUBJECTIVE    Says her pain is better controlled with new regimen. No other complains.   CURRENT MEDICATIONS    sodium chloride      sodium chloride        albuterol, ketorolac, morphine, oxyCODONE-acetaminophen, sodium chloride flush, sodium chloride, sodium chloride flush, sodium chloride, ondansetron **OR** ondansetron, polyethylene glycol, acetaminophen **OR** acetaminophen    albuterol  2.5 mg Nebulization BID RT    sodium chloride flush  5-40 mL IntraVENous 2 times per day    lidocaine 1 % injection  50 mg IntraDERmal Once    cefepime  2,000 mg IntraVENous Q12H    amiodarone  200 mg Oral BID    sodium chloride flush  5-40 mL IntraVENous 2 times per day    vancomycin (VANCOCIN) intermittent dosing (placeholder)   Other RX Placeholder    sodium zirconium cyclosilicate  10 g Oral Once    docusate sodium  100 mg Oral Nightly    apixaban  2.5 mg Oral BID    pantoprazole  40 mg Oral QAM AC    atorvastatin  20 mg Oral Daily        PHYSICAL EXAM   BP (!) 117/53   Pulse 64   Temp 97.8 °F (36.6 °C) (Temporal)   Resp 18   Ht 1.524 m (5')   Wt 38.6 kg (85 lb)   SpO2 93%   BMI 16.60 kg/m²     Intake/Output Summary (Last 24 hours) at 7/15/2024 1623  Last data filed at 7/15/2024 0812  Gross per 24 hour   Intake 180 ml   Output --   Net 180 ml          GENERAL:  NAD. A&Ox3.  LUNGS:  No increased work of breathing.   L chest wound + drainage, dressing change done, redness improved  CARDIOVASCULAR: RR  ABDOMEN:  Soft, non-distended, non-tender. No guarding, rigidity, rebound.  R LE  + granulation tissue, + seronsang drainage   No cellultisi   Dressing change done    LABS    Lab Results   Component Value Date    WBC 6.6 07/12/2024    HGB 9.8 (L) 07/12/2024    HCT 33.8 (L) 07/12/2024     07/12/2024    PROTIME 16.7 (H) 06/04/2024    INR 1.5 06/04/2024    K 4.2 07/15/2024    BUN 18 07/15/2024    CREATININE 0.7 
VASCULAR SURGERY PROGRESS NOTE    Pt is being seen in f/u today regarding pacemaker site infection     SUBJECTIVE    Says her pain is better controlled with new regimen. No other complains. Dr. KIMBROUGH to change dressing this AM.     CURRENT MEDICATIONS    sodium chloride      sodium chloride        ketorolac, morphine, oxyCODONE-acetaminophen, sodium chloride flush, sodium chloride, sodium chloride flush, sodium chloride, ondansetron **OR** ondansetron, polyethylene glycol, acetaminophen **OR** acetaminophen    sodium chloride flush  5-40 mL IntraVENous 2 times per day    lidocaine 1 % injection  50 mg IntraDERmal Once    cefepime  2,000 mg IntraVENous Q12H    amiodarone  200 mg Oral BID    magnesium oxide  200 mg Oral Daily    sodium chloride flush  5-40 mL IntraVENous 2 times per day    vancomycin (VANCOCIN) intermittent dosing (placeholder)   Other RX Placeholder    sodium zirconium cyclosilicate  10 g Oral Once    albuterol  2.5 mg Nebulization Q4H While awake    docusate sodium  100 mg Oral Nightly    apixaban  2.5 mg Oral BID    pantoprazole  40 mg Oral QAM AC    atorvastatin  20 mg Oral Daily        PHYSICAL EXAM   BP (!) 150/50   Pulse 69   Temp 97.6 °F (36.4 °C) (Temporal)   Resp 18   Ht 1.524 m (5')   Wt 38.6 kg (85 lb)   SpO2 92%   BMI 16.60 kg/m²   No intake or output data in the 24 hours ending 07/14/24 0805         GENERAL:  NAD. A&Ox3.  LUNGS:  No increased work of breathing. L chest wound without bleeding. No drainage from wound.   CARDIOVASCULAR: RR  ABDOMEN:  Soft, non-distended, non-tender. No guarding, rigidity, rebound.  EXTREMITIES:   MAEx4. Atraumatic. No LE edema.    LABS    Lab Results   Component Value Date    WBC 6.6 07/12/2024    HGB 9.8 (L) 07/12/2024    HCT 33.8 (L) 07/12/2024     07/12/2024    PROTIME 16.7 (H) 06/04/2024    INR 1.5 06/04/2024    K 3.9 07/12/2024    BUN 9 07/12/2024    CREATININE 0.6 07/12/2024       ASSESSMENT/PLAN  89 y.o. female w/ infected pacemaker s/p 
VASCULAR SURGERY PROGRESS NOTE    Pt is being seen in f/u today regarding pacemaker site infection     SUBJECTIVE  Pt seen/examined.  Doing well, she does have pain over the wound. Dressing taken down. No significant bleeding or drainage present but there is erythema and tenderness.     CURRENT MEDICATIONS    sodium chloride        morphine, oxyCODONE-acetaminophen, sodium chloride flush, sodium chloride, ondansetron **OR** ondansetron, polyethylene glycol, acetaminophen **OR** acetaminophen    amiodarone  200 mg Oral BID    magnesium oxide  200 mg Oral Daily    piperacillin-tazobactam  4,500 mg IntraVENous Q8H    sodium chloride flush  5-40 mL IntraVENous 2 times per day    vancomycin (VANCOCIN) intermittent dosing (placeholder)   Other RX Placeholder    sodium zirconium cyclosilicate  10 g Oral Once    albuterol  2.5 mg Nebulization Q4H While awake    docusate sodium  100 mg Oral Nightly    [Held by provider] apixaban  2.5 mg Oral BID    [Held by provider] metoprolol succinate  50 mg Oral BID    pantoprazole  40 mg Oral QAM AC    atorvastatin  20 mg Oral Daily        PHYSICAL EXAM   BP (!) 112/56   Pulse 91   Temp 97.6 °F (36.4 °C) (Temporal)   Resp 11   Ht 1.524 m (5')   Wt 38.6 kg (85 lb)   SpO2 93%   BMI 16.60 kg/m²     Intake/Output Summary (Last 24 hours) at 7/12/2024 0655  Last data filed at 7/11/2024 1542  Gross per 24 hour   Intake 525 ml   Output 5 ml   Net 520 ml          GENERAL:  NAD. A&Ox3.  LUNGS:  No increased work of breathing. L chest wound without bleeding, drainage on dressings ss.   CARDIOVASCULAR: RR  ABDOMEN:  Soft, non-distended, non-tender. No guarding, rigidity, rebound.  EXTREMITIES:   MAEx4. Atraumatic. No LE edema.    LABS    Lab Results   Component Value Date    WBC 7.2 07/11/2024    HGB 10.9 (L) 07/11/2024    HCT 35.5 07/11/2024     07/11/2024    PROTIME 16.7 (H) 06/04/2024    INR 1.5 06/04/2024    K 3.5 07/11/2024    BUN 13 07/11/2024    CREATININE 0.7 07/11/2024 
Vascular consult placed via perfect serve.  Dr KIMBROUGH taking messages    [facility] messaged via perfect serve per Dr KIMBROUGH's request.  Dr Lopez taking messages  
Standing:  SBA with WW Sitting EOB:  Independent   Dynamic Standing:  Mod I with WW     Pt is A & O x 4  Sensation:  NT  Edema:  none noted      Patient education  Pt educated on role of PT, safety during mobility    Patient response to education:   Pt verbalized understanding Pt demonstrated skill Pt requires further education in this area   yes yes yes     ASSESSMENT:    Comments:  pt semi-supine in bed upon entry and agreeable to PT treatment. Pt able to complete all mobility with no assist. Pt able to stand and ambulate into [place] with WW and step to gait pattern d/t pt preference. Pt returned to room and positioned in chair at end of session.   All needs met and call light in reach. All lines remained intact.     Treatment:  Patient practiced and was instructed in the following treatment:    Bed Mobility: VCs provided for sequencing and safety during mobility.   Transfer Training: Verbal and tactile cueing provided for sequencing and safety during mobility.   Gait Training: Ambulation with WW and verbal cues for proper technique and safety.    PLAN:    Patient is making good progress towards established goals.  Will continue with current POC.      Time in  1040  Time out  1055    Total Treatment Time  15 minutes     CPT codes:  [] Gait training 68674 - minutes  [] Manual therapy 30228 - minutes  [x] Therapeutic activities 59728 15 minutes  [] Therapeutic exercises 08670 - minutes  [] Neuromuscular reeducation 11701 - minutes    Janette Sue PT, DPT  [id number]   
for wound care     -Patient may continue to follow-up with [facility] for her right lower extremity wound as she is previously known to a wound clinic if there  -Will need home health care for wound dressing changes over left chest wall.  -Continue daily dressing changes over left chest wall-packed with Opticell AG and cover with sterile 4 x 4/ABDs and paper tape. Changed this AM.  - Will put information in to follow up in local wound care clinic   - Appreciate ID recs for antibiotics        Haleigh Haney, DO  Surgery Resident PGY-3  7/13/2024  7:17 AM    Pt seen and examined    I will change chest dressing in am 7/14 - pt would prefer it not to be changed again today  R LE wound - alginate, abdominal pad, kerlex, koban dressing applied - will plan to change MWF    Will premedicate prior to dressing change    Pain not well controlled  Will adjust pain med - percocet to q4 prn, morphine to 2 mg q 3 prn breakthrough     Jamal Crowe MD      
  Hyperkalemia: Resolved lokelma 10 g once.  Now hypokalemia      DVT Prophylaxis [] Lovenox, []  Heparin, [] SCDs, [] Ambulation   GI Prophylaxis [] PPI,  [] H2 Blocker,  [] Carafate,  [] Diet/Tube Feeds   Disposition Patient requires continued admission due to pacemaker infection requiring IV antibiotic, likely pacemaker removal   MDM [] Low, [] Moderate,[]  High  Patient's risk as above due to        Medications:  REVIEWED DAILY    Infusion Medications    sodium chloride       Scheduled Medications    magnesium oxide  200 mg Oral Daily    potassium chloride  20 mEq Oral Once    vancomycin  1,000 mg IntraVENous Once    sodium chloride flush  5-40 mL IntraVENous 2 times per day    ceFAZolin  2,000 mg IntraVENous Q12H    vancomycin (VANCOCIN) intermittent dosing (placeholder)   Other RX Placeholder    sodium zirconium cyclosilicate  10 g Oral Once    albuterol  2.5 mg Nebulization Q4H While awake    docusate sodium  100 mg Oral Nightly    [Held by provider] apixaban  2.5 mg Oral BID    [Held by provider] metoprolol succinate  50 mg Oral BID    pantoprazole  40 mg Oral QAM AC    atorvastatin  20 mg Oral Daily     PRN Meds: sodium chloride flush, sodium chloride, ondansetron **OR** ondansetron, polyethylene glycol, acetaminophen **OR** acetaminophen    Labs:     Recent Labs     07/09/24  2214 07/10/24  0643   WBC 10.4 7.1   HGB 11.5 11.4*   HCT 37.2 35.6    216       Recent Labs     07/09/24  1700 07/09/24  2214 07/10/24  0643    141 143   K 5.4* 4.1 3.4*    103 103   CO2 27 28 30*   BUN 31* 27* 22   CREATININE 0.8 1.0 0.7   CALCIUM 9.2 8.8 9.1       Recent Labs     07/09/24  1700   ALKPHOS 122*   ALT 12   AST 31   BILITOT 0.2       No results for input(s): \"INR\" in the last 72 hours.    No results for input(s): \"CKTOTAL\", \"TROPONINI\" in the last 72 hours.    Chronic labs:    Lab Results   Component Value Date    CHOL 100 06/15/2024    TRIG 43 06/15/2024    HDL 60 06/15/2024    TSH 1.35 06/15/2024 
  Hypertension, stable currently on amiodarone, will monitor vitals     COPD/asthma not on exacerbation, continue DuoNeb nebulization as needed     Severe protein calorie malnutrition.  Present on admission.  BMI 16.6.  Nutritional consult     Chronic RLE wound: wound care consult.  Vascular surgery consulted [facility] before dressing changes and as needed for ongoing pain             DVT Prophylaxis [] Lovenox, []  Heparin, [] SCDs, [] Ambulation   GI Prophylaxis [] PPI,  [] H2 Blocker,  [] Carafate,  [] Diet/Tube Feeds   Disposition Patient requires continued admission due to will need PICC line placement, clearance from electrophysiologist, home health care set up before discharge   MDM [] Low, [] Moderate,[]  High  Patient's risk as above due to        Medications:  REVIEWED DAILY    Infusion Medications    sodium chloride      sodium chloride       Scheduled Medications    sodium chloride flush  5-40 mL IntraVENous 2 times per day    lidocaine 1 % injection  50 mg IntraDERmal Once    cefepime  2,000 mg IntraVENous Q12H    amiodarone  200 mg Oral BID    sodium chloride flush  5-40 mL IntraVENous 2 times per day    vancomycin (VANCOCIN) intermittent dosing (placeholder)   Other RX Placeholder    sodium zirconium cyclosilicate  10 g Oral Once    albuterol  2.5 mg Nebulization Q4H While awake    docusate sodium  100 mg Oral Nightly    apixaban  2.5 mg Oral BID    pantoprazole  40 mg Oral QAM AC    atorvastatin  20 mg Oral Daily     PRN Meds: ketorolac, morphine, oxyCODONE-acetaminophen, sodium chloride flush, sodium chloride, sodium chloride flush, sodium chloride, ondansetron **OR** ondansetron, polyethylene glycol, acetaminophen **OR** acetaminophen    Labs:     Recent Labs     07/12/24  0704   WBC 6.6   HGB 9.8*   HCT 33.8*          Recent Labs     07/12/24  0704      K 3.9   *   CO2 17*   BUN 9   CREATININE 0.6   CALCIUM 8.6       No results for input(s): \"ALKPHOS\", \"ALT\", \"AST\", \"BILITOT\", 
Value Date    CRP 11.0 (H) 06/09/2024    CRP 9.0 (H) 06/07/2024    CRP 13.0 (H) 06/05/2024     Lab Results   Component Value Date    SEDRATE 9 06/09/2024    SEDRATE 11 06/07/2024    SEDRATE 18 06/05/2024     Radiology:      Microbiology:   No results found for: \"BC\", \"ORG\"  No results found for: \"BLOODCULT2\", \"ORG\"  No results found for: \"WNDABS\"  No results found for: \"RESPSMEAR\"      Component Value Date/Time    MPNEUMO Not Detected 06/16/2024 1015     No results found for: \"CULTRESP\"  No results found for: \"CXCATHTIP\"  No results found for: \"BFCS\"  No results found for: \"CXSURG\"  No results found for: \"LABURIN\"  No results found for: \"MRSAC\"    ASSESSMENT:  Pacemaker pocket infection  Pacemaker lead vegetation with Pseudomonas  Pansensitive Pseudomonas pocket wound infection  Leg wound  Status post pacemaker removal 07/11    Plan:  continue cefepime 2 g every 12 for 4 weeks.  CRP sed rate procalcitonin.  BELINDA negative for endocarditis 07/11  Blood cultures no growth so far  Supportive care  PICC line ordered  Patient can be discharged from ID standpoint  Follow-up with ID clinic within 1 week    Daron Rogers MD  12:51 PM  7/15/2024  
and/or coordination of care with the other providers, reviewing records/tests, counseling/education of the patient, ordering medications/tests/procedures, coordinating care, and documenting clinical information in the EHR.    Chai Rivero MD  Cardiac Electrophysiology  [facility]  [facility] [place]: [facility]  6:02 PM  7/11/2024

## 2024-07-15 NOTE — CARE COORDINATION
Chart reviewed. Discharge order noted. Await signed IV prescription.  [facility] aware of [facility] Daughter will transport home when above arranged. Prescription faxed to [facility] and they will coordinate delivery and administration time with Isaías.

## 2024-07-15 NOTE — DISCHARGE SUMMARY
Hospital Medicine Discharge Summary    Patient ID: Shauna Gaines      Patient's PCP: Ross Mercer MD    Admit Date: 7/9/2024     Discharge Date:   07/15/2024    Admitting Physician: No admitting provider for patient encounter.     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    Hospital Course:   89-year-old female patient with medical history of ALS, COPD, hyperlipidemia, hypertension, hearing impairment, PPM due to sinus sick syndrome and A-fib and pauses, long-term anticoagulation with Eliquis who presented to the ER for concern of drainage around the site of the pacemaker.  PPM was implanted on March 2024 at [facility].    ER evaluation temperature 99 F /52 HR 62 RR 16 SpO2 92% in room air.  Labs CBC pending, potassium 5.4, BUN 31, creatinine 0.8.  Blood culture and wound culture sent CT scan of the chest was done which showed mild basilar peribronchial thickening and patchy tree-in-bud infiltrate suggesting mild pneumonitis likely aspiration related.  patient was given Ancef and vancomycin.    Electrophysiology and ID consulted    Wound culture grew Pseudomonas, switched antibiotic from Ancef to Zosyn    07/11 patient underwent removal of pacemaker, patient underwent BELINDA did not show any vegetation.    Patient was started on amiodarone as per electrophysiologist, Holter monitor at the time of discharge.       Vascular surgery was consulted for dressing of right lower extremity wound  Continue daily dressing changes over

## 2024-07-15 NOTE — CARE COORDINATION
Call placed to Dr. Mercer's office to schedule follow up appointment. The first available appointment is 08/20/2024 @ 8:20 with MEGAN Singleton due to office being closed next week and a week in August.  AUG    20 Appointment with Dr. Ross Mrecer MD  Tuesday Aug 20, 2024 8:20 AM  MEGAN Singleton     Patient and daughter notified at bedside of appointment with Dr Mercer's NP, both verbalized understanding.

## 2024-07-16 ENCOUNTER — TELEPHONE (OUTPATIENT)
Dept: NON INVASIVE DIAGNOSTICS | Age: 89
End: 2024-07-16

## 2024-07-16 NOTE — TELEPHONE ENCOUNTER
Luzmaria (daughter) called to find out why the patient was taken off of lasix when discharged from the hospital yesterday (7/15/24). The patient has swelling in her wrists, legs, ankles, and feet. The patient experienced some dizziness and lightheadedness this morning after taking her pain medication but possibly did not eat enough food prior to. Luzmaria wants to know if the patient can go back on lasix or what she should do about the swelling. Message sent to physician.     Electronically signed by Lia Underwood MA on 7/16/2024 at 2:36 PM

## 2024-07-17 ENCOUNTER — TELEPHONE (OUTPATIENT)
Dept: NON INVASIVE DIAGNOSTICS | Age: 89
End: 2024-07-17

## 2024-07-17 NOTE — TELEPHONE ENCOUNTER
I received an email from [employer] that the ZIO AT was not activated. I called Luzmaria and we attempted to activate the  but it kept flashing orange. I transferred Luzmaria to the [employer]  for further assistance.     Electronically signed by Lia Underwood MA on 7/17/2024 at 2:27 PM

## 2024-07-17 NOTE — TELEPHONE ENCOUNTER
I left a VM with daughters (Irena and Luzmaria) of the recommendation of Dr. Rivero and advised to call the office back to confirm the message has been received.     Electronically signed by Lia Underwood MA on 7/17/2024 at 9:08 AM

## 2024-07-18 LAB
ALBUMIN: 3.3 G/DL (ref 3.5–5.2)
ALP BLD-CCNC: 86 U/L (ref 35–104)
ALT SERPL-CCNC: 5 U/L (ref 0–32)
ANION GAP SERPL CALCULATED.3IONS-SCNC: 7 MMOL/L (ref 7–16)
AST SERPL-CCNC: 14 U/L (ref 0–31)
BASOPHILS ABSOLUTE: 0.04 K/UL (ref 0–0.2)
BASOPHILS RELATIVE PERCENT: 1 % (ref 0–2)
BILIRUB SERPL-MCNC: <0.2 MG/DL (ref 0–1.2)
BUN BLDV-MCNC: 20 MG/DL (ref 6–23)
C-REACTIVE PROTEIN: 8 MG/L (ref 0–5)
CALCIUM SERPL-MCNC: 8.8 MG/DL (ref 8.6–10.2)
CHLORIDE BLD-SCNC: 106 MMOL/L (ref 98–107)
CO2: 31 MMOL/L (ref 22–29)
CREAT SERPL-MCNC: 0.7 MG/DL (ref 0.5–1)
EOSINOPHILS ABSOLUTE: 0.32 K/UL (ref 0.05–0.5)
EOSINOPHILS RELATIVE PERCENT: 4 % (ref 0–6)
GFR, ESTIMATED: 79 ML/MIN/1.73M2
GLUCOSE BLD-MCNC: 113 MG/DL (ref 74–99)
HCT VFR BLD CALC: 34.1 % (ref 34–48)
HEMOGLOBIN: 10.3 G/DL (ref 11.5–15.5)
IMMATURE GRANULOCYTES %: 0 % (ref 0–5)
IMMATURE GRANULOCYTES ABSOLUTE: <0.03 K/UL (ref 0–0.58)
LYMPHOCYTES ABSOLUTE: 1.3 K/UL (ref 1.5–4)
LYMPHOCYTES RELATIVE PERCENT: 17 % (ref 20–42)
MCH RBC QN AUTO: 28.8 PG (ref 26–35)
MCHC RBC AUTO-ENTMCNC: 30.2 G/DL (ref 32–34.5)
MCV RBC AUTO: 95.3 FL (ref 80–99.9)
MONOCYTES ABSOLUTE: 0.96 K/UL (ref 0.1–0.95)
MONOCYTES RELATIVE PERCENT: 13 % (ref 2–12)
NEUTROPHILS ABSOLUTE: 4.98 K/UL (ref 1.8–7.3)
NEUTROPHILS RELATIVE PERCENT: 65 % (ref 43–80)
PDW BLD-RTO: 15.6 % (ref 11.5–15)
PLATELET # BLD: 228 K/UL (ref 130–450)
PMV BLD AUTO: 10.5 FL (ref 7–12)
POTASSIUM SERPL-SCNC: 3.9 MMOL/L (ref 3.5–5)
RBC # BLD: 3.58 M/UL (ref 3.5–5.5)
SED RATE, AUTOMATED: 22 MM/HR (ref 0–20)
SODIUM BLD-SCNC: 144 MMOL/L (ref 132–146)
TOTAL PROTEIN: 5.3 G/DL (ref 6.4–8.3)
WBC # BLD: 7.6 K/UL (ref 4.5–11.5)

## 2024-07-22 ENCOUNTER — NURSE ONLY (OUTPATIENT)
Dept: NON INVASIVE DIAGNOSTICS | Age: 89
End: 2024-07-22
Payer: MEDICARE

## 2024-07-22 VITALS — DIASTOLIC BLOOD PRESSURE: 42 MMHG | HEART RATE: 61 BPM | OXYGEN SATURATION: 91 % | SYSTOLIC BLOOD PRESSURE: 118 MMHG

## 2024-07-22 DIAGNOSIS — I49.9 IRREGULAR HEART BEAT: Primary | ICD-10-CM

## 2024-07-22 LAB
ALBUMIN: 3.8 G/DL (ref 3.5–5.2)
ALP BLD-CCNC: 94 U/L (ref 35–104)
ALT SERPL-CCNC: 7 U/L (ref 0–32)
ANION GAP SERPL CALCULATED.3IONS-SCNC: 12 MMOL/L (ref 7–16)
AST SERPL-CCNC: 20 U/L (ref 0–31)
BASOPHILS ABSOLUTE: 0.09 K/UL (ref 0–0.2)
BASOPHILS RELATIVE PERCENT: 1 % (ref 0–2)
BILIRUB SERPL-MCNC: 0.3 MG/DL (ref 0–1.2)
BUN BLDV-MCNC: 30 MG/DL (ref 6–23)
C-REACTIVE PROTEIN: 5 MG/L (ref 0–5)
CALCIUM SERPL-MCNC: 9.2 MG/DL (ref 8.6–10.2)
CHLORIDE BLD-SCNC: 96 MMOL/L (ref 98–107)
CO2: 36 MMOL/L (ref 22–29)
CREAT SERPL-MCNC: 1 MG/DL (ref 0.5–1)
EOSINOPHILS ABSOLUTE: 0.4 K/UL (ref 0.05–0.5)
EOSINOPHILS RELATIVE PERCENT: 5 % (ref 0–6)
GFR, ESTIMATED: 56 ML/MIN/1.73M2
GLUCOSE BLD-MCNC: 74 MG/DL (ref 74–99)
HCT VFR BLD CALC: 38.4 % (ref 34–48)
HEMOGLOBIN: 11.3 G/DL (ref 11.5–15.5)
IMMATURE GRANULOCYTES %: 0 % (ref 0–5)
IMMATURE GRANULOCYTES ABSOLUTE: 0.03 K/UL (ref 0–0.58)
LYMPHOCYTES ABSOLUTE: 1.95 K/UL (ref 1.5–4)
LYMPHOCYTES RELATIVE PERCENT: 24 % (ref 20–42)
MCH RBC QN AUTO: 27.8 PG (ref 26–35)
MCHC RBC AUTO-ENTMCNC: 29.4 G/DL (ref 32–34.5)
MCV RBC AUTO: 94.3 FL (ref 80–99.9)
MONOCYTES ABSOLUTE: 0.96 K/UL (ref 0.1–0.95)
MONOCYTES RELATIVE PERCENT: 12 % (ref 2–12)
NEUTROPHILS ABSOLUTE: 4.66 K/UL (ref 1.8–7.3)
NEUTROPHILS RELATIVE PERCENT: 58 % (ref 43–80)
PDW BLD-RTO: 15.7 % (ref 11.5–15)
PLATELET # BLD: 282 K/UL (ref 130–450)
PMV BLD AUTO: 10.8 FL (ref 7–12)
POTASSIUM SERPL-SCNC: 3.1 MMOL/L (ref 3.5–5)
RBC # BLD: 4.07 M/UL (ref 3.5–5.5)
SED RATE, AUTOMATED: 9 MM/HR (ref 0–20)
SODIUM BLD-SCNC: 144 MMOL/L (ref 132–146)
TOTAL PROTEIN: 6.2 G/DL (ref 6.4–8.3)
WBC # BLD: 8.1 K/UL (ref 4.5–11.5)

## 2024-07-22 PROCEDURE — 93000 ELECTROCARDIOGRAM COMPLETE: CPT | Performed by: INTERNAL MEDICINE

## 2024-07-22 NOTE — PROGRESS NOTES
EKG preformed today as per Dr Chai Rivero, for ekg.     BP:  118 42  left upper arm Sitting  P:  61  SP O2: 91    Pt states He/She feels ok    EKG done, discussed with Dr Chai Rivero    Electronically signed by RICHIE PINEDO MA on 7/22/2024 at 2:55 PM

## 2024-07-22 NOTE — PROGRESS NOTES
Reports nausea and poor appetite. Advised to decrease Amiodarone to 200 mg daily and plan to discontinue Amiodarone if symptoms persist.

## 2024-07-23 NOTE — DISCHARGE INSTRUCTIONS
Visit Discharge/Physician Orders     Discharge condition: Stable     Assessment of pain at discharge: moderate     Anesthetic used: 4% lidocaine     Discharge to: Home     Left via:Private automobile     Accompanied by: family     ECF/HHA: [facility] *New order*      Dressing Orders: Right leg: cleanse wound with normal saline, apply alginate AG to wound, ABD, and Coban 2. Change Monday,Wednesday (@[facility]), and Friday.      Cleanse wound to left chest with normal saline, apply ALGINATE AG to wound bed and cover with ABD pad and- adhere with tape, change daily.     *Do not wet Calcium Ag when removing*     Treatment Orders: FOLLOW NUTRITIOUS DIET. CHOOSE FOODS HIGH IN PROTEIN -CHICKEN- FISH-AND EGGS,  CHOOSE FOODS HIGH IN VITAMIN C.   MULTIVITAMIN DAILY.       [facility] followup visit ___Dr. KIMBROUGH 1 week _______________________  (Please note your next appointment above and if you are unable to keep, kindly give a 24 hour notice. Thank you.)     Physician signature:__________________________        If you experience any of the following, please call the Wound Care Center during business hours:     * Increase in Pain  * Temperature over 101  * Increase in drainage from your wound  * Drainage with a foul odor  * Bleeding  * Increase in swelling  * Need for compression bandage changes due to slippage, breakthrough drainage.     If you need medical attention outside of the business hours of the Wound Care Centers please contact your PCP or go to the nearest emergency room.

## 2024-07-24 ENCOUNTER — HOSPITAL ENCOUNTER (OUTPATIENT)
Dept: WOUND CARE | Age: 89
Discharge: HOME | End: 2024-07-24
Attending: SURGERY
Payer: MEDICARE

## 2024-07-24 VITALS
HEIGHT: 60 IN | BODY MASS INDEX: 16.69 KG/M2 | TEMPERATURE: 98 F | DIASTOLIC BLOOD PRESSURE: 64 MMHG | SYSTOLIC BLOOD PRESSURE: 136 MMHG | WEIGHT: 85 LBS | RESPIRATION RATE: 20 BRPM | HEART RATE: 76 BPM

## 2024-07-24 DIAGNOSIS — L03.115 CELLULITIS OF RIGHT LEG: Primary | ICD-10-CM

## 2024-07-24 DIAGNOSIS — T82.7XXS PACEMAKER INFECTION, SEQUELA: ICD-10-CM

## 2024-07-24 DIAGNOSIS — I87.2 VENOUS STASIS ULCER OF RIGHT CALF WITH FAT LAYER EXPOSED WITHOUT VARICOSE VEINS (HCC): Chronic | ICD-10-CM

## 2024-07-24 DIAGNOSIS — L97.212 VENOUS STASIS ULCER OF RIGHT CALF WITH FAT LAYER EXPOSED WITHOUT VARICOSE VEINS (HCC): Chronic | ICD-10-CM

## 2024-07-24 DIAGNOSIS — L98.422 SKIN ULCER OF THORACIC REGION WITH FAT LAYER EXPOSED (HCC): Chronic | ICD-10-CM

## 2024-07-24 PROCEDURE — 11042 DBRDMT SUBQ TIS 1ST 20SQCM/<: CPT | Performed by: SURGERY

## 2024-07-24 PROCEDURE — 0HD5XZZ EXTRACTION OF CHEST SKIN, EXTERNAL APPROACH: ICD-10-PCS | Performed by: SURGERY

## 2024-07-24 PROCEDURE — 11043 DBRDMT MUSC&/FSCA 1ST 20/<: CPT

## 2024-07-24 RX ORDER — LIDOCAINE HYDROCHLORIDE 40 MG/ML
SOLUTION TOPICAL ONCE
OUTPATIENT
Start: 2024-07-24 | End: 2024-07-24

## 2024-07-24 RX ORDER — LIDOCAINE HYDROCHLORIDE 20 MG/ML
JELLY TOPICAL ONCE
OUTPATIENT
Start: 2024-07-24 | End: 2024-07-24

## 2024-07-24 RX ORDER — BETAMETHASONE DIPROPIONATE 0.5 MG/G
CREAM TOPICAL ONCE
OUTPATIENT
Start: 2024-07-24 | End: 2024-07-24

## 2024-07-24 RX ORDER — GENTAMICIN SULFATE 1 MG/G
OINTMENT TOPICAL ONCE
OUTPATIENT
Start: 2024-07-24 | End: 2024-07-24

## 2024-07-24 RX ORDER — BACITRACIN ZINC 500 [USP'U]/G
OINTMENT TOPICAL ONCE
OUTPATIENT
Start: 2024-07-24 | End: 2024-07-24

## 2024-07-24 RX ORDER — SODIUM CHLOR/HYPOCHLOROUS ACID 0.033 %
SOLUTION, IRRIGATION IRRIGATION ONCE
OUTPATIENT
Start: 2024-07-24 | End: 2024-07-24

## 2024-07-24 RX ORDER — IBUPROFEN 200 MG
TABLET ORAL ONCE
OUTPATIENT
Start: 2024-07-24 | End: 2024-07-24

## 2024-07-24 RX ORDER — LIDOCAINE HYDROCHLORIDE 40 MG/ML
SOLUTION TOPICAL ONCE
Status: COMPLETED | OUTPATIENT
Start: 2024-07-24 | End: 2024-07-24

## 2024-07-24 RX ORDER — LIDOCAINE 50 MG/G
OINTMENT TOPICAL ONCE
OUTPATIENT
Start: 2024-07-24 | End: 2024-07-24

## 2024-07-24 RX ORDER — CLOBETASOL PROPIONATE 0.5 MG/G
OINTMENT TOPICAL ONCE
OUTPATIENT
Start: 2024-07-24 | End: 2024-07-24

## 2024-07-24 RX ORDER — TRIAMCINOLONE ACETONIDE 1 MG/G
OINTMENT TOPICAL ONCE
OUTPATIENT
Start: 2024-07-24 | End: 2024-07-24

## 2024-07-24 RX ORDER — BACITRACIN ZINC AND POLYMYXIN B SULFATE 500; 1000 [USP'U]/G; [USP'U]/G
OINTMENT TOPICAL ONCE
OUTPATIENT
Start: 2024-07-24 | End: 2024-07-24

## 2024-07-24 RX ORDER — LIDOCAINE 40 MG/G
CREAM TOPICAL ONCE
OUTPATIENT
Start: 2024-07-24 | End: 2024-07-24

## 2024-07-24 RX ADMIN — LIDOCAINE HYDROCHLORIDE 10 ML: 40 SOLUTION TOPICAL at 10:37

## 2024-07-24 NOTE — PROGRESS NOTES
Hypertension     Skin ulcer of thoracic region with fat layer exposed (HCC) 2024    Venous stasis ulcer of right calf with fat layer exposed without varicose veins (HCC) 2024     Past Surgical History:   Procedure Laterality Date    APPENDECTOMY      BACK SURGERY      x3    CHOLECYSTECTOMY      EP DEVICE PROCEDURE N/A 2024    Pacemaker lead extraction transvenous performed by Sharla Chavira MD at [facility] CARDIAC CATH LAB    HERNIA REPAIR      HIP ARTHROPLASTY Bilateral     LEG SURGERY Right 2024    RIGHT LEG INCISION AND DRAINAGE POSSIBLE WOUND VAC APPLICATION performed by Jared Dexter DPM at [facility] OR    PACEMAKER INSERTION  2024    TONSILLECTOMY       History reviewed. No pertinent family history.  Social History     Tobacco Use    Smoking status: Former     Current packs/day: 0.00     Average packs/day: 0.5 packs/day for 20.0 years (10.0 ttl pk-yrs)     Types: Cigarettes     Start date: 1970     Quit date: 1990     Years since quittin.5     Passive exposure: Never    Smokeless tobacco: Never   Vaping Use    Vaping Use: Never used   Substance Use Topics    Alcohol use: Not Currently    Drug use: Never     Allergies   Allergen Reactions    Adhesive Tape Other (See Comments)     \"Tears Skin\"      Current Outpatient Medications on File Prior to Encounter   Medication Sig Dispense Refill    meloxicam (MOBIC) 7.5 MG tablet Take 1 tablet by mouth daily as needed for Pain (moderate pain) 7 tablet 0    cefepime (MAXIPIME) infusion Infuse 2,000 mg intravenously in the morning and 2,000 mg in the evening. Compound per protocol. 60 g 1    amiodarone (CORDARONE) 200 MG tablet Take 1 tablet by mouth 2 times daily for 14 days 28 tablet 0    [START ON 2024] amiodarone (CORDARONE) 200 MG tablet Take 1 tablet by mouth daily 30 tablet 3    pantoprazole (PROTONIX) 40 MG tablet Take 1 tablet by mouth every morning (before breakfast) 30 tablet 0    docusate sodium (COLACE) 100 MG capsule

## 2024-07-24 NOTE — NURSE NAVIGATOR
I was called down to wound care  to address Mrs. Liana Wills patch that was falling off as she was there for an outpatient wound appt. The patch was located underneath the location of her pacemaker extraction/wound site and was hanging on by a small piece of adhesive and had drainage on it. Myself and  doctor PK removed the patch. I placed a new patch on her right side to avoid the wound. Patch was activated (green light) and gateway was activated (green light). Gave instructions to the patient and her daughter jorge. Patient has had device on since 7/15/24 and is due to remove the device on Monday 7/29/24 (14 days). Instructed daughter to send back the device as well as the gateway in provided envelope and to place in the mailbox. She verbalized understanding.  I also called [employer] customer service to have the accounts linked which was done and I was assured the new device was registered and the two patches/accounts have been linked. Barrett Krause with [employer] also aware of this.

## 2024-07-25 ENCOUNTER — HOSPITAL ENCOUNTER (INPATIENT)
Age: 89
LOS: 3 days | Discharge: HOSPICE | DRG: 640 | End: 2024-07-28
Attending: EMERGENCY MEDICINE | Admitting: INTERNAL MEDICINE
Payer: MEDICARE

## 2024-07-25 ENCOUNTER — APPOINTMENT (OUTPATIENT)
Dept: GENERAL RADIOLOGY | Age: 89
DRG: 640 | End: 2024-07-25
Payer: MEDICARE

## 2024-07-25 DIAGNOSIS — R53.83 OTHER FATIGUE: Primary | ICD-10-CM

## 2024-07-25 DIAGNOSIS — E87.6 HYPOKALEMIA: ICD-10-CM

## 2024-07-25 DIAGNOSIS — R53.1 GENERALIZED WEAKNESS: ICD-10-CM

## 2024-07-25 LAB
ALBUMIN SERPL-MCNC: 3.7 G/DL (ref 3.5–5.2)
ALP SERPL-CCNC: 85 U/L (ref 35–104)
ALT SERPL-CCNC: 8 U/L (ref 0–32)
ANION GAP SERPL CALCULATED.3IONS-SCNC: 10 MMOL/L (ref 7–16)
AST SERPL-CCNC: 18 U/L (ref 0–31)
BASOPHILS # BLD: 0.07 K/UL (ref 0–0.2)
BASOPHILS NFR BLD: 1 % (ref 0–2)
BILIRUB SERPL-MCNC: 0.3 MG/DL (ref 0–1.2)
BILIRUB UR QL STRIP: NEGATIVE
BUN SERPL-MCNC: 36 MG/DL (ref 6–23)
CALCIUM SERPL-MCNC: 9.1 MG/DL (ref 8.6–10.2)
CASTS #/AREA URNS LPF: ABNORMAL /LPF
CHLORIDE SERPL-SCNC: 96 MMOL/L (ref 98–107)
CLARITY UR: CLEAR
CO2 SERPL-SCNC: 36 MMOL/L (ref 22–29)
COLOR UR: YELLOW
CREAT SERPL-MCNC: 0.9 MG/DL (ref 0.5–1)
EOSINOPHIL # BLD: 0.18 K/UL (ref 0.05–0.5)
EOSINOPHILS RELATIVE PERCENT: 2 % (ref 0–6)
EPI CELLS #/AREA URNS HPF: ABNORMAL /HPF
ERYTHROCYTE [DISTWIDTH] IN BLOOD BY AUTOMATED COUNT: 15.1 % (ref 11.5–15)
GFR, ESTIMATED: 58 ML/MIN/1.73M2
GLUCOSE SERPL-MCNC: 107 MG/DL (ref 74–99)
GLUCOSE UR STRIP-MCNC: NEGATIVE MG/DL
HCT VFR BLD AUTO: 36.6 % (ref 34–48)
HGB BLD-MCNC: 11.1 G/DL (ref 11.5–15.5)
HGB UR QL STRIP.AUTO: ABNORMAL
IMM GRANULOCYTES # BLD AUTO: <0.03 K/UL (ref 0–0.58)
IMM GRANULOCYTES NFR BLD: 0 % (ref 0–5)
INR PPP: 2.3
KETONES UR STRIP-MCNC: NEGATIVE MG/DL
LACTATE BLDV-SCNC: 1.2 MMOL/L (ref 0.5–2.2)
LEUKOCYTE ESTERASE UR QL STRIP: NEGATIVE
LYMPHOCYTES NFR BLD: 1.29 K/UL (ref 1.5–4)
LYMPHOCYTES RELATIVE PERCENT: 17 % (ref 20–42)
MAGNESIUM SERPL-MCNC: 2 MG/DL (ref 1.6–2.6)
MCH RBC QN AUTO: 27.3 PG (ref 26–35)
MCHC RBC AUTO-ENTMCNC: 30.3 G/DL (ref 32–34.5)
MCV RBC AUTO: 90.1 FL (ref 80–99.9)
MONOCYTES NFR BLD: 0.94 K/UL (ref 0.1–0.95)
MONOCYTES NFR BLD: 13 % (ref 2–12)
NEUTROPHILS NFR BLD: 67 % (ref 43–80)
NEUTS SEG NFR BLD: 4.97 K/UL (ref 1.8–7.3)
NITRITE UR QL STRIP: NEGATIVE
PH UR STRIP: 7 [PH] (ref 5–9)
PLATELET # BLD AUTO: 231 K/UL (ref 130–450)
PMV BLD AUTO: 10.6 FL (ref 7–12)
POTASSIUM SERPL-SCNC: 2.9 MMOL/L (ref 3.5–5)
PROT SERPL-MCNC: 6.3 G/DL (ref 6.4–8.3)
PROT UR STRIP-MCNC: ABNORMAL MG/DL
PROTHROMBIN TIME: 24.8 SEC (ref 9.3–12.4)
RBC # BLD AUTO: 4.06 M/UL (ref 3.5–5.5)
RBC #/AREA URNS HPF: ABNORMAL /HPF
SODIUM SERPL-SCNC: 142 MMOL/L (ref 132–146)
SP GR UR STRIP: 1.01 (ref 1–1.03)
TROPONIN I SERPL HS-MCNC: 19 NG/L (ref 0–9)
TROPONIN I SERPL HS-MCNC: 21 NG/L (ref 0–9)
UROBILINOGEN UR STRIP-ACNC: 0.2 EU/DL (ref 0–1)
WBC #/AREA URNS HPF: ABNORMAL /HPF
WBC OTHER # BLD: 7.5 K/UL (ref 4.5–11.5)

## 2024-07-25 PROCEDURE — 2580000003 HC RX 258: Performed by: EMERGENCY MEDICINE

## 2024-07-25 PROCEDURE — 83735 ASSAY OF MAGNESIUM: CPT

## 2024-07-25 PROCEDURE — 6370000000 HC RX 637 (ALT 250 FOR IP): Performed by: EMERGENCY MEDICINE

## 2024-07-25 PROCEDURE — 81001 URINALYSIS AUTO W/SCOPE: CPT

## 2024-07-25 PROCEDURE — 85610 PROTHROMBIN TIME: CPT

## 2024-07-25 PROCEDURE — 85025 COMPLETE CBC W/AUTO DIFF WBC: CPT

## 2024-07-25 PROCEDURE — 6360000002 HC RX W HCPCS: Performed by: EMERGENCY MEDICINE

## 2024-07-25 PROCEDURE — 84484 ASSAY OF TROPONIN QUANT: CPT

## 2024-07-25 PROCEDURE — 96365 THER/PROPH/DIAG IV INF INIT: CPT

## 2024-07-25 PROCEDURE — 99285 EMERGENCY DEPT VISIT HI MDM: CPT

## 2024-07-25 PROCEDURE — 71045 X-RAY EXAM CHEST 1 VIEW: CPT

## 2024-07-25 PROCEDURE — 80053 COMPREHEN METABOLIC PANEL: CPT

## 2024-07-25 PROCEDURE — 96361 HYDRATE IV INFUSION ADD-ON: CPT

## 2024-07-25 PROCEDURE — 83605 ASSAY OF LACTIC ACID: CPT

## 2024-07-25 PROCEDURE — 99222 1ST HOSP IP/OBS MODERATE 55: CPT | Performed by: INTERNAL MEDICINE

## 2024-07-25 PROCEDURE — 1200000000 HC SEMI PRIVATE

## 2024-07-25 RX ORDER — ACETAMINOPHEN AND CODEINE PHOSPHATE 300; 30 MG/1; MG/1
1 TABLET ORAL EVERY 6 HOURS PRN
Status: ON HOLD | COMMUNITY
End: 2024-07-28 | Stop reason: HOSPADM

## 2024-07-25 RX ORDER — POTASSIUM CHLORIDE 7.45 MG/ML
10 INJECTION INTRAVENOUS ONCE
Status: COMPLETED | OUTPATIENT
Start: 2024-07-25 | End: 2024-07-25

## 2024-07-25 RX ORDER — 0.9 % SODIUM CHLORIDE 0.9 %
500 INTRAVENOUS SOLUTION INTRAVENOUS ONCE
Status: COMPLETED | OUTPATIENT
Start: 2024-07-25 | End: 2024-07-25

## 2024-07-25 RX ORDER — POTASSIUM CHLORIDE 20 MEQ/1
40 TABLET, EXTENDED RELEASE ORAL ONCE
Status: COMPLETED | OUTPATIENT
Start: 2024-07-25 | End: 2024-07-25

## 2024-07-25 RX ORDER — ONDANSETRON 4 MG/1
4 TABLET, ORALLY DISINTEGRATING ORAL EVERY 8 HOURS PRN
Status: ON HOLD | COMMUNITY
Start: 2024-07-20 | End: 2024-07-28 | Stop reason: HOSPADM

## 2024-07-25 RX ORDER — SODIUM CHLORIDE 9 MG/ML
INJECTION, SOLUTION INTRAVENOUS CONTINUOUS
Status: DISCONTINUED | OUTPATIENT
Start: 2024-07-25 | End: 2024-07-27

## 2024-07-25 RX ORDER — FUROSEMIDE 20 MG/1
20 TABLET ORAL DAILY
Status: ON HOLD | COMMUNITY
Start: 2024-07-20 | End: 2024-07-28 | Stop reason: HOSPADM

## 2024-07-25 RX ORDER — HYDROCODONE BITARTRATE AND ACETAMINOPHEN 10; 325 MG/1; MG/1
1 TABLET ORAL ONCE
Status: COMPLETED | OUTPATIENT
Start: 2024-07-26 | End: 2024-07-26

## 2024-07-25 RX ADMIN — SODIUM CHLORIDE 500 ML: 9 INJECTION, SOLUTION INTRAVENOUS at 16:46

## 2024-07-25 RX ADMIN — SODIUM CHLORIDE 500 ML: 9 INJECTION, SOLUTION INTRAVENOUS at 20:37

## 2024-07-25 RX ADMIN — POTASSIUM CHLORIDE 40 MEQ: 1500 TABLET, EXTENDED RELEASE ORAL at 18:49

## 2024-07-25 RX ADMIN — SODIUM CHLORIDE: 9 INJECTION, SOLUTION INTRAVENOUS at 22:20

## 2024-07-25 RX ADMIN — CEFEPIME 2000 MG: 2 INJECTION, POWDER, FOR SOLUTION INTRAVENOUS at 18:17

## 2024-07-25 RX ADMIN — POTASSIUM CHLORIDE 10 MEQ: 7.46 INJECTION, SOLUTION INTRAVENOUS at 18:49

## 2024-07-25 ASSESSMENT — LIFESTYLE VARIABLES
HOW MANY STANDARD DRINKS CONTAINING ALCOHOL DO YOU HAVE ON A TYPICAL DAY: PATIENT DOES NOT DRINK
HOW OFTEN DO YOU HAVE A DRINK CONTAINING ALCOHOL: NEVER

## 2024-07-25 NOTE — ED PROVIDER NOTES
HPI:  7/25/24,   Time: 4:03 PM EDT       Shauna Gaines is a 89 y.o. female presenting to the ED for fatigue, beginning 1 week ago.  The complaint has been persistent, moderate in severity, and worsened by nothing.  Brought in by EMS.  Denies fever chills or sweats.  No nausea vomiting diarrhea.  No cough or congestion.  This feels fatigued.  Had pacemaker removed earlier this month due to infection as well as cellulitis.  Saw wound care yesterday.  Nothing makes better.    Review of Systems:   Pertinent positives and negatives are stated within HPI, all other systems reviewed and are negative.          --------------------------------------------- PAST HISTORY ---------------------------------------------  Past Medical History:  has a past medical history of Aortic valve stenosis, Asthma, COPD (chronic obstructive pulmonary disease) (HCC), COVID, Hearing aid worn, Heart murmur, Hyperlipidemia, Hypertension, Skin ulcer of thoracic region with fat layer exposed ([facility]), and Venous stasis ulcer of right calf with fat layer exposed without varicose veins ([facility]).    Past Surgical History:  has a past surgical history that includes hernia repair (2021); back surgery; Hip Arthroplasty (Bilateral); Tonsillectomy; Cholecystectomy; Appendectomy; Pacemaker insertion (03/18/2024); Leg Surgery (Right, 6/5/2024); and ep device procedure (N/A, 7/11/2024).    Social History:  reports that she quit smoking about 34 years ago. Her smoking use included cigarettes. She started smoking about 54 years ago. She has a 10.0 pack-year smoking history. She has never been exposed to tobacco smoke. She has never used smokeless tobacco. She reports that she does not currently use alcohol. She reports that she does not use drugs.    Family History: family history is not on file.     The patient’s home medications have been reviewed.    Allergies: Adhesive tape        ---------------------------------------------------PHYSICAL

## 2024-07-26 ENCOUNTER — TELEPHONE (OUTPATIENT)
Dept: CARDIOLOGY CLINIC | Age: 89
End: 2024-07-26

## 2024-07-26 PROBLEM — R00.1 SINUS BRADYCARDIA: Status: ACTIVE | Noted: 2024-07-26

## 2024-07-26 PROBLEM — I48.0 PAROXYSMAL ATRIAL FIBRILLATION (HCC): Status: ACTIVE | Noted: 2024-07-26

## 2024-07-26 PROBLEM — T14.8XXA WOUND INFECTION: Status: ACTIVE | Noted: 2024-07-26

## 2024-07-26 PROBLEM — L08.9 WOUND INFECTION: Status: ACTIVE | Noted: 2024-07-26

## 2024-07-26 PROBLEM — R62.7 FAILURE TO THRIVE IN ADULT: Status: ACTIVE | Noted: 2024-07-26

## 2024-07-26 LAB
ANION GAP SERPL CALCULATED.3IONS-SCNC: 8 MMOL/L (ref 7–16)
ANION GAP SERPL CALCULATED.3IONS-SCNC: 8 MMOL/L (ref 7–16)
B PARAP IS1001 DNA NPH QL NAA+NON-PROBE: NOT DETECTED
B PERT DNA SPEC QL NAA+PROBE: NOT DETECTED
BUN SERPL-MCNC: 27 MG/DL (ref 6–23)
BUN SERPL-MCNC: 31 MG/DL (ref 6–23)
C PNEUM DNA NPH QL NAA+NON-PROBE: NOT DETECTED
CALCIUM SERPL-MCNC: 8.2 MG/DL (ref 8.6–10.2)
CALCIUM SERPL-MCNC: 8.5 MG/DL (ref 8.6–10.2)
CHLORIDE SERPL-SCNC: 102 MMOL/L (ref 98–107)
CHLORIDE SERPL-SCNC: 105 MMOL/L (ref 98–107)
CO2 SERPL-SCNC: 31 MMOL/L (ref 22–29)
CO2 SERPL-SCNC: 32 MMOL/L (ref 22–29)
CREAT SERPL-MCNC: 0.8 MG/DL (ref 0.5–1)
CREAT SERPL-MCNC: 0.9 MG/DL (ref 0.5–1)
FLUAV RNA NPH QL NAA+NON-PROBE: NOT DETECTED
FLUBV RNA NPH QL NAA+NON-PROBE: NOT DETECTED
GFR, ESTIMATED: 58 ML/MIN/1.73M2
GFR, ESTIMATED: 71 ML/MIN/1.73M2
GLUCOSE SERPL-MCNC: 110 MG/DL (ref 74–99)
GLUCOSE SERPL-MCNC: 92 MG/DL (ref 74–99)
HADV DNA NPH QL NAA+NON-PROBE: NOT DETECTED
HCOV 229E RNA NPH QL NAA+NON-PROBE: NOT DETECTED
HCOV HKU1 RNA NPH QL NAA+NON-PROBE: NOT DETECTED
HCOV NL63 RNA NPH QL NAA+NON-PROBE: NOT DETECTED
HCOV OC43 RNA NPH QL NAA+NON-PROBE: NOT DETECTED
HMPV RNA NPH QL NAA+NON-PROBE: NOT DETECTED
HPIV1 RNA NPH QL NAA+NON-PROBE: NOT DETECTED
HPIV2 RNA NPH QL NAA+NON-PROBE: NOT DETECTED
HPIV3 RNA NPH QL NAA+NON-PROBE: NOT DETECTED
HPIV4 RNA NPH QL NAA+NON-PROBE: NOT DETECTED
M PNEUMO DNA NPH QL NAA+NON-PROBE: NOT DETECTED
MAGNESIUM SERPL-MCNC: 1.9 MG/DL (ref 1.6–2.6)
POTASSIUM SERPL-SCNC: 3.3 MMOL/L (ref 3.5–5)
POTASSIUM SERPL-SCNC: 3.6 MMOL/L (ref 3.5–5)
RSV RNA NPH QL NAA+NON-PROBE: NOT DETECTED
RV+EV RNA NPH QL NAA+NON-PROBE: NOT DETECTED
SARS-COV-2 RNA NPH QL NAA+NON-PROBE: NOT DETECTED
SODIUM SERPL-SCNC: 142 MMOL/L (ref 132–146)
SODIUM SERPL-SCNC: 144 MMOL/L (ref 132–146)
SPECIMEN DESCRIPTION: NORMAL
TSH SERPL DL<=0.05 MIU/L-ACNC: 1.72 UIU/ML (ref 0.27–4.2)

## 2024-07-26 PROCEDURE — 94640 AIRWAY INHALATION TREATMENT: CPT

## 2024-07-26 PROCEDURE — 2580000003 HC RX 258: Performed by: EMERGENCY MEDICINE

## 2024-07-26 PROCEDURE — 1200000000 HC SEMI PRIVATE

## 2024-07-26 PROCEDURE — 6360000002 HC RX W HCPCS: Performed by: INTERNAL MEDICINE

## 2024-07-26 PROCEDURE — 2580000003 HC RX 258: Performed by: INTERNAL MEDICINE

## 2024-07-26 PROCEDURE — 6370000000 HC RX 637 (ALT 250 FOR IP)

## 2024-07-26 PROCEDURE — 6370000000 HC RX 637 (ALT 250 FOR IP): Performed by: INTERNAL MEDICINE

## 2024-07-26 PROCEDURE — 99222 1ST HOSP IP/OBS MODERATE 55: CPT | Performed by: STUDENT IN AN ORGANIZED HEALTH CARE EDUCATION/TRAINING PROGRAM

## 2024-07-26 PROCEDURE — 80048 BASIC METABOLIC PNL TOTAL CA: CPT

## 2024-07-26 PROCEDURE — 0202U NFCT DS 22 TRGT SARS-COV-2: CPT

## 2024-07-26 PROCEDURE — 2500000003 HC RX 250 WO HCPCS: Performed by: INTERNAL MEDICINE

## 2024-07-26 PROCEDURE — 2700000000 HC OXYGEN THERAPY PER DAY

## 2024-07-26 PROCEDURE — 84443 ASSAY THYROID STIM HORMONE: CPT

## 2024-07-26 PROCEDURE — 83735 ASSAY OF MAGNESIUM: CPT

## 2024-07-26 PROCEDURE — 94664 DEMO&/EVAL PT USE INHALER: CPT

## 2024-07-26 PROCEDURE — 99232 SBSQ HOSP IP/OBS MODERATE 35: CPT | Performed by: INTERNAL MEDICINE

## 2024-07-26 RX ORDER — SODIUM CHLORIDE 0.9 % (FLUSH) 0.9 %
5-40 SYRINGE (ML) INJECTION EVERY 12 HOURS SCHEDULED
Status: DISCONTINUED | OUTPATIENT
Start: 2024-07-26 | End: 2024-07-27

## 2024-07-26 RX ORDER — SODIUM CHLORIDE, SODIUM LACTATE, POTASSIUM CHLORIDE, CALCIUM CHLORIDE 600; 310; 30; 20 MG/100ML; MG/100ML; MG/100ML; MG/100ML
INJECTION, SOLUTION INTRAVENOUS CONTINUOUS
Status: DISCONTINUED | OUTPATIENT
Start: 2024-07-26 | End: 2024-07-26

## 2024-07-26 RX ORDER — POLYETHYLENE GLYCOL 3350 17 G/17G
17 POWDER, FOR SOLUTION ORAL DAILY PRN
Status: DISCONTINUED | OUTPATIENT
Start: 2024-07-26 | End: 2024-07-27

## 2024-07-26 RX ORDER — PANTOPRAZOLE SODIUM 40 MG/1
40 TABLET, DELAYED RELEASE ORAL
Status: DISCONTINUED | OUTPATIENT
Start: 2024-07-26 | End: 2024-07-26

## 2024-07-26 RX ORDER — POTASSIUM CHLORIDE 20 MEQ/1
20 TABLET, EXTENDED RELEASE ORAL ONCE
Status: COMPLETED | OUTPATIENT
Start: 2024-07-26 | End: 2024-07-26

## 2024-07-26 RX ORDER — IPRATROPIUM BROMIDE AND ALBUTEROL SULFATE 2.5; .5 MG/3ML; MG/3ML
1 SOLUTION RESPIRATORY (INHALATION)
Status: DISCONTINUED | OUTPATIENT
Start: 2024-07-26 | End: 2024-07-27

## 2024-07-26 RX ORDER — ATORVASTATIN CALCIUM 10 MG/1
10 TABLET, FILM COATED ORAL DAILY
Status: DISCONTINUED | OUTPATIENT
Start: 2024-07-26 | End: 2024-07-27

## 2024-07-26 RX ORDER — ACETAMINOPHEN 650 MG/1
650 SUPPOSITORY RECTAL EVERY 6 HOURS PRN
Status: DISCONTINUED | OUTPATIENT
Start: 2024-07-26 | End: 2024-07-27

## 2024-07-26 RX ORDER — PANTOPRAZOLE SODIUM 40 MG/1
40 TABLET, DELAYED RELEASE ORAL
Status: DISCONTINUED | OUTPATIENT
Start: 2024-07-26 | End: 2024-07-27

## 2024-07-26 RX ORDER — POTASSIUM CHLORIDE 7.45 MG/ML
10 INJECTION INTRAVENOUS
Status: ACTIVE | OUTPATIENT
Start: 2024-07-26 | End: 2024-07-26

## 2024-07-26 RX ORDER — SODIUM CHLORIDE 0.9 % (FLUSH) 0.9 %
5-40 SYRINGE (ML) INJECTION PRN
Status: DISCONTINUED | OUTPATIENT
Start: 2024-07-26 | End: 2024-07-27

## 2024-07-26 RX ORDER — ONDANSETRON 4 MG/1
4 TABLET, ORALLY DISINTEGRATING ORAL EVERY 8 HOURS PRN
Status: DISCONTINUED | OUTPATIENT
Start: 2024-07-26 | End: 2024-07-27

## 2024-07-26 RX ORDER — ACETAMINOPHEN 325 MG/1
650 TABLET ORAL EVERY 6 HOURS PRN
Status: DISCONTINUED | OUTPATIENT
Start: 2024-07-26 | End: 2024-07-27

## 2024-07-26 RX ORDER — SODIUM CHLORIDE 9 MG/ML
INJECTION, SOLUTION INTRAVENOUS PRN
Status: DISCONTINUED | OUTPATIENT
Start: 2024-07-26 | End: 2024-07-27

## 2024-07-26 RX ORDER — ARFORMOTEROL TARTRATE 15 UG/2ML
15 SOLUTION RESPIRATORY (INHALATION)
Status: DISCONTINUED | OUTPATIENT
Start: 2024-07-26 | End: 2024-07-27

## 2024-07-26 RX ORDER — ONDANSETRON 2 MG/ML
4 INJECTION INTRAMUSCULAR; INTRAVENOUS EVERY 6 HOURS PRN
Status: DISCONTINUED | OUTPATIENT
Start: 2024-07-26 | End: 2024-07-27

## 2024-07-26 RX ORDER — HYDROCODONE BITARTRATE AND ACETAMINOPHEN 5; 325 MG/1; MG/1
1 TABLET ORAL EVERY 6 HOURS PRN
Status: DISCONTINUED | OUTPATIENT
Start: 2024-07-26 | End: 2024-07-27

## 2024-07-26 RX ORDER — CHOLECALCIFEROL (VITAMIN D3) 50 MCG
2000 TABLET ORAL DAILY
Status: DISCONTINUED | OUTPATIENT
Start: 2024-07-26 | End: 2024-07-27

## 2024-07-26 RX ADMIN — Medication 2000 UNITS: at 08:02

## 2024-07-26 RX ADMIN — MICONAZOLE NITRATE: 20.6 POWDER TOPICAL at 21:03

## 2024-07-26 RX ADMIN — ARFORMOTEROL TARTRATE 15 MCG: 15 SOLUTION RESPIRATORY (INHALATION) at 20:37

## 2024-07-26 RX ADMIN — ATORVASTATIN CALCIUM 10 MG: 10 TABLET, FILM COATED ORAL at 08:02

## 2024-07-26 RX ADMIN — APIXABAN 2.5 MG: 2.5 TABLET, FILM COATED ORAL at 08:02

## 2024-07-26 RX ADMIN — CEFEPIME 1000 MG: 1 INJECTION, POWDER, FOR SOLUTION INTRAMUSCULAR; INTRAVENOUS at 08:18

## 2024-07-26 RX ADMIN — PANTOPRAZOLE SODIUM 40 MG: 40 TABLET, DELAYED RELEASE ORAL at 17:12

## 2024-07-26 RX ADMIN — ONDANSETRON 4 MG: 2 INJECTION INTRAMUSCULAR; INTRAVENOUS at 07:55

## 2024-07-26 RX ADMIN — ACETAMINOPHEN 650 MG: 325 TABLET ORAL at 22:00

## 2024-07-26 RX ADMIN — ONDANSETRON 4 MG: 2 INJECTION INTRAMUSCULAR; INTRAVENOUS at 22:08

## 2024-07-26 RX ADMIN — PIPERACILLIN AND TAZOBACTAM 3375 MG: 3; .375 INJECTION, POWDER, LYOPHILIZED, FOR SOLUTION INTRAVENOUS at 20:56

## 2024-07-26 RX ADMIN — POTASSIUM CHLORIDE 20 MEQ: 1500 TABLET, EXTENDED RELEASE ORAL at 08:01

## 2024-07-26 RX ADMIN — PIPERACILLIN AND TAZOBACTAM 4500 MG: 4; .5 INJECTION, POWDER, FOR SOLUTION INTRAVENOUS at 13:05

## 2024-07-26 RX ADMIN — SODIUM CHLORIDE, PRESERVATIVE FREE 10 ML: 5 INJECTION INTRAVENOUS at 20:52

## 2024-07-26 RX ADMIN — PANTOPRAZOLE SODIUM 40 MG: 40 TABLET, DELAYED RELEASE ORAL at 08:02

## 2024-07-26 RX ADMIN — SODIUM CHLORIDE: 9 INJECTION, SOLUTION INTRAVENOUS at 23:23

## 2024-07-26 RX ADMIN — HYDROCODONE BITARTRATE AND ACETAMINOPHEN 1 TABLET: 10; 325 TABLET ORAL at 00:27

## 2024-07-26 RX ADMIN — MICONAZOLE NITRATE: 20.6 POWDER TOPICAL at 11:00

## 2024-07-26 RX ADMIN — SODIUM CHLORIDE, PRESERVATIVE FREE 10 ML: 5 INJECTION INTRAVENOUS at 08:03

## 2024-07-26 RX ADMIN — APIXABAN 2.5 MG: 2.5 TABLET, FILM COATED ORAL at 20:52

## 2024-07-26 RX ADMIN — SODIUM CHLORIDE: 9 INJECTION, SOLUTION INTRAVENOUS at 08:17

## 2024-07-26 RX ADMIN — IPRATROPIUM BROMIDE AND ALBUTEROL SULFATE 1 DOSE: 2.5; .5 SOLUTION RESPIRATORY (INHALATION) at 12:39

## 2024-07-26 RX ADMIN — IPRATROPIUM BROMIDE AND ALBUTEROL SULFATE 1 DOSE: 2.5; .5 SOLUTION RESPIRATORY (INHALATION) at 20:37

## 2024-07-26 ASSESSMENT — PAIN SCALES - GENERAL
PAINLEVEL_OUTOF10: 6
PAINLEVEL_OUTOF10: 7
PAINLEVEL_OUTOF10: 7

## 2024-07-26 ASSESSMENT — PAIN DESCRIPTION - LOCATION
LOCATION: FOOT;LEG
LOCATION: CHEST
LOCATION: LEG

## 2024-07-26 ASSESSMENT — PAIN SCALES - WONG BAKER: WONGBAKER_NUMERICALRESPONSE: NO HURT

## 2024-07-26 ASSESSMENT — PAIN DESCRIPTION - ORIENTATION
ORIENTATION: RIGHT;MID
ORIENTATION: RIGHT;LEFT
ORIENTATION: RIGHT;LEFT

## 2024-07-26 ASSESSMENT — PAIN DESCRIPTION - DESCRIPTORS
DESCRIPTORS: ACHING;DISCOMFORT
DESCRIPTORS: ACHING;STABBING

## 2024-07-26 NOTE — TELEPHONE ENCOUNTER
----- Message from Lia Underwood MA sent at 7/26/2024  3:17 PM EDT -----  Regarding: FW: appt    ----- Message -----  From: Reji Duggan DO  Sent: 7/26/2024   3:14 PM EDT  To: Lia Underwood MA  Subject: appt                                             EP appointment in 3 months.    -Reji Duggan DO        Pt arrived from OR at 1100. Afebrile, VSS. Remains on epidural with ropivicaine and clonidine. X1 bolus given and rate increased by RAPs team. Stable on room air. Having some bradycardia (high 60s-low 70s) while sleeping, RAPs notified this evening. No urine output, no stool. Mom at bedside throughout day, updated on plan of care.

## 2024-07-26 NOTE — PROGRESS NOTES
[facility] Hospitalist Progress Note    Admitting Date and Time: 7/25/2024  3:49 PM  Admit Dx: Hypokalemia [E87.6]  Generalized weakness [R53.1]  Other fatigue [R53.83]    Synopsis: Patient is 89-year-old lady with past medical history of ALS, COPD, chronic hypoxic respiratory failure uses 3 L baseline oxygen, hyperlipidemia, hypertension, history of sick sinus syndrome s/p pacemaker placement and A-fib on anticoagulation with Eliquis.  She was recently admitted and discharged on 7/15, she was treated for pacemaker site infection, pacemaker was removed, pacemaker lead vegetation with Pseudomonas, pansensitive Pseudomonas and she was started on cefepime by ID and discharged with PICC line to continue cefepime for 4 weeks.  She was also started on amiodarone by EP.  On that admission patient reported nausea and poor appetite and amiodarone dose was adjusted with the plan of stopping amiodarone if patient continues to have nausea and vomiting.  Patient now reported increased fatigue and weakness over the last few days with nausea and poor oral intake.  She was brought to ER and was found to have significant hypokalemia with potassium of 2.9, she was given potassium supplementation in ED, IV fluid bolus and continued on cefepime.  She has been admitted under hospitalist service with consult to EP in setting of intractable nausea and poor oral intake likely due to amiodarone, amiodarone has been currently placed on hold.    Subjective:  Patient is being followed for Hypokalemia [E87.6]  Generalized weakness [R53.1]  Other fatigue [R53.83]     Patient's daughter at bedside.  Patient is eating pudding,  Overall poor oral intake per daughter.  She was also confused prior to presentation, per daughter at bedside mental status is improving.    ROS: denies fever, chills, cp, sob, n/v, HA unless stated above.      sodium chloride flush  5-40 mL IntraVENous 2 times per day    ipratropium 0.5 mg-albuterol 2.5 mg  1 Dose

## 2024-07-26 NOTE — PROGRESS NOTES
Antibiotic Extended Infusion Policy     This patient is on medication that requires renal, weight, and/or indication dose adjustment.      Date Body Weight IBW  Adjusted BW SCr  CrCl Dialysis status BMI   7/26/2024   Ideal body weight: 50.4 kg (111 lb 3.2 oz) Serum creatinine: 0.9 mg/dL 07/26/24 0041  Estimated creatinine clearance: 26 mL/min N/a There is no height or weight on file to calculate BMI.       Pharmacy has dose-adjusted the following medication(s):    Ordered Medication: Cefepime 2000mg q12h     Order Changed/converted to: Cefepime 1000mg q12h    These changes were made per protocol according to the [facility]   [facility] Extended Infusion Dose Adjustment Policy.     *Please note this dose may need readjusted if patient's condition changes.    Please contact pharmacy with any questions regarding these changes.    Dwayne Suarez RPH  7/26/2024  5:23 AM

## 2024-07-26 NOTE — PROGRESS NOTES
4 Eyes Skin Assessment     NAME:  Shauna Gaines  YOB: 1935  MEDICAL RECORD NUMBER:  [id number]    The patient is being assessed for  Admission    I agree that at least one RN has performed a thorough Head to Toe Skin Assessment on the patient. ALL assessment sites listed below have been assessed.      Areas assessed by both nurses:    Head, Face, Ears, Shoulders, Back, Chest, Arms, Elbows, Hands, Sacrum. Buttock, Coccyx, Ischium, and Legs. Feet and Heels        Does the Patient have a Wound? Yes wound(s) were present on assessment. LDA wound assessment was Initiated and completed by RN    -Blanchable redness on bilateral buttocks, spine,  and bilateral heels. Skin noted to be dry and flaky.   -Abrasion back of L calf  -L chest wound (from pacemaker removal)- 3x2.5 x0.2  -Right lower extremity wound 5x4x0.2  -Rash noted under bilateral breasts- miconazole ordered and applied.          Johnnie Prevention initiated by RN: Yes  Wound Care Orders initiated by RN: Yes    Pressure Injury (Stage 3,4, Unstageable, DTI, NWPT, and Complex wounds) if present, place Wound referral order by RN under : No    New Ostomies, if present place, Ostomy referral order under : No     Nurse 1 eSignature: Electronically signed by Brendon Angulo RN on 7/26/24 at 2:15 PM EDT    **SHARE this note so that the co-signing nurse can place an eSignature**    Nurse 2 eSignature: Electronically signed by Jacinda Dominguez RN on 7/26/24 at 6:17 PM EDT

## 2024-07-26 NOTE — CARE COORDINATION
Social Work/Case Management Transition of Care Planning (Najma COLINDRES [phone]):  Patient presented to the hospital due to concerns of nausea and vomiting.  K was noted to be 2.9 on admission.  K was supplemented.  Patient was started on IVF. Patient was discharged on 7/15 with IV antibiotics.  She is on IV Cefepime q12.  ID was consulted.  EP was consulted.  Met with patient and her daughter, Mariel, at bedside.  Patient resides in a 2 story home but only uses the main floor.  There are 2-3 BRY.  She lives with her daughter, Luzmaria.  Patient has been dependent on her family for assistance with all aspects of care.  She uses a FWW.  She is on 3L NC at baseline.  [employer] is the supplier.  She does have a nebulizer.  PCP is Dr. Mercer.  Pharmacy is [facility] in [place].  ROSALINDA history at [facility].   Patient is active with [facility].  However, family has reached out to [facility] and may want to transition to hospice upon discharge.  Call received from [facility].  She indicated they can accept the patient over the weekend if needed.  Call will need to be placed to [phone] if discharged to home with [facility].  CM/SW will follow.  JENS Bhakta  7/26/2024    Case Management Assessment  Initial Evaluation    Date/Time of Evaluation: 7/26/2024 3:19 PM  Assessment Completed by: JENS Bhakta    If patient is discharged prior to next notation, then this note serves as note for discharge by case management.    Patient Name: Shauna Gaines                   YOB: 1935  Diagnosis: Hypokalemia [E87.6]  Generalized weakness [R53.1]  Other fatigue [R53.83]                   Date / Time: 7/25/2024  3:49 PM    Patient Admission Status: Inpatient   Readmission Risk (Low < 19, Mod (19-27), High > 27): Readmission Risk Score: 22.7    Current PCP: Ross Mercer MD  PCP verified by CM? Yes    Chart Reviewed: Yes      History Provided by: Patient, Child/Family  Patient

## 2024-07-26 NOTE — ED NOTES
Received report from Nahun RN; assumed care of pt at this time; resting in bed; family at bedside; vitals cycling; needs met; call bell in reach; will monitor

## 2024-07-26 NOTE — PLAN OF CARE
Problem: ABCDS Injury Assessment  Goal: Absence of physical injury  7/26/2024 1521 by Brendon Angulo, RN  Outcome: Progressing  Flowsheets (Taken 7/26/2024 0800)  Absence of Physical Injury: Implement safety measures based on patient assessment  7/26/2024 0706 by Xochitl Chirinos, RN  Outcome: Progressing     Problem: Skin/Tissue Integrity  Goal: Absence of new skin breakdown  Description: 1.  Monitor for areas of redness and/or skin breakdown  2.  Assess vascular access sites hourly  3.  Every 4-6 hours minimum:  Change oxygen saturation probe site  4.  Every 4-6 hours:  If on nasal continuous positive airway pressure, respiratory therapy assess nares and determine need for appliance change or resting period.  7/26/2024 1521 by Brendon Angulo RN  Outcome: Progressing  7/26/2024 0706 by Xochitl Chirinos, RN  Outcome: Progressing     Problem: Safety - Adult  Goal: Free from fall injury  Outcome: Progressing

## 2024-07-26 NOTE — ACP (ADVANCE CARE PLANNING)
Advance Care Planning   The patient has the following advanced directives on file:  Advance Directives       Power of  Living Will ACP-Advance Directive ACP-Power of     Not on File Filed on 06/13/24 Filed Not on File            The patient has appointed the following active healthcare agents:    Primary Decision Maker: Luzmaria Keller Child - [phone]    Primary Decision Maker: Irena Edouard - Child - [phone]    JENS Bhakta  7/26/2024

## 2024-07-26 NOTE — CONSULTS
[facility]  [facility]    Consultation Note     Admit Date: 7/25/2024  3:49 PM    Reason for Consult:   Abx management     Attending Physician:  Melanie Montogmery MD     Chief Complaint: nausea & Vomiting    HISTORY OF PRESENT ILLNESS:   89 yo F with PMX of AS, Asthma,COPD, HLD,HTN,recent 07/15 PPM Pocket Infection with pseudomonas, PPM lead infection with Pseudomonas discharged with 4 week course of cefepime presented back with nausea and vomiting. ID consulted for Abx management.    Past Medical History:        Diagnosis Date    Aortic valve stenosis     Asthma     COPD (chronic obstructive pulmonary disease) ([facility])     COVID 12/2022    Hearing aid worn     Heart murmur     Hyperlipidemia     Hypertension     Skin ulcer of thoracic region with fat layer exposed ([facility]) 07/24/2024    Venous stasis ulcer of right calf with fat layer exposed without varicose veins ([facility]) 07/08/2024     Past Surgical History:        Procedure Laterality Date    APPENDECTOMY      BACK SURGERY      x3    CHOLECYSTECTOMY      EP DEVICE PROCEDURE N/A 7/11/2024    Pacemaker lead extraction transvenous performed by Sharla Chavira MD at [facility] CARDIAC CATH LAB    HERNIA REPAIR  2021    HIP ARTHROPLASTY Bilateral     LEG SURGERY Right 6/5/2024    RIGHT LEG INCISION AND DRAINAGE POSSIBLE WOUND VAC APPLICATION performed by Jared Dexter DPM at [facility] OR    PACEMAKER INSERTION  03/18/2024    TONSILLECTOMY       Current Medications:   Scheduled Meds:   sodium chloride flush  5-40 mL IntraVENous 2 times per day    ipratropium 0.5 mg-albuterol 2.5 mg  1 Dose Inhalation Q4H WA RT    vitamin D  2,000 Units Oral Daily    apixaban  2.5 mg Oral BID    pantoprazole  40 mg Oral QAM AC    atorvastatin  10 mg Oral Daily    arformoterol tartrate  15 mcg Nebulization BID RT    miconazole   Topical BID    piperacillin-tazobactam  3,375 mg IntraVENous Q8H    piperacillin-tazobactam  4,500 mg IntraVENous Once     Continuous Infusions:      NEUTROABS 4.97     Lab Results   Component Value Date    CRP 5.0 07/22/2024    CRP 8.0 (H) 07/18/2024    CRP 11.0 (H) 06/09/2024     No results found for: \"CRPHS\"  Lab Results   Component Value Date    SEDRATE 9 07/22/2024    SEDRATE 22 (H) 07/18/2024    SEDRATE 9 06/09/2024     Lab Results   Component Value Date    ALT 8 07/25/2024    AST 18 07/25/2024    ALKPHOS 85 07/25/2024    BILITOT 0.3 07/25/2024     Lab Results   Component Value Date/Time     07/26/2024 08:30 AM    K 3.6 07/26/2024 08:30 AM     07/26/2024 08:30 AM    CO2 31 07/26/2024 08:30 AM    BUN 27 07/26/2024 08:30 AM    CREATININE 0.8 07/26/2024 08:30 AM    LABGLOM 71 07/26/2024 08:30 AM    LABGLOM >60 07/30/2023 11:19 PM    GLUCOSE 92 07/26/2024 08:30 AM    CALCIUM 8.5 07/26/2024 08:30 AM    BILITOT 0.3 07/25/2024 04:31 PM    ALKPHOS 85 07/25/2024 04:31 PM    AST 18 07/25/2024 04:31 PM    ALT 8 07/25/2024 04:31 PM       Lab Results   Component Value Date/Time    PROTIME 24.8 07/25/2024 04:31 PM    INR 2.3 07/25/2024 04:31 PM       Lab Results   Component Value Date/Time    TSH 0.80 07/10/2024 06:43 AM       Lab Results   Component Value Date/Time    COLORU Yellow 07/25/2024 04:41 PM    PHUR 7.0 07/25/2024 04:41 PM    WBCUA 0 TO 5 07/25/2024 04:41 PM    RBCUA 0 TO 2 07/25/2024 04:41 PM    LEUKOCYTESUR NEGATIVE 07/25/2024 04:41 PM    UROBILINOGEN 0.2 07/25/2024 04:41 PM    BILIRUBINUR NEGATIVE 07/25/2024 04:41 PM    GLUCOSEU NEGATIVE 07/25/2024 04:41 PM       No results found for: \"YTW7AND\", \"BEART\", \"P9FDNZHX\", \"PHART\", \"THGBART\", \"MWQ7EPA\", \"PO2ART\", \"RSM0HPQ\"  Radiology:  XR CHEST PORTABLE   Final Result   1. Redemonstration of chronic irregular opacities bilaterally.   2. No focal pneumonia or pleural effusion.   3. Improved aeration in the lung bases compared to prior from July 14th.             Microbiology:  Pending  No results for input(s): \"BC\" in the last 72 hours.  No results for input(s): \"ORG\" in the last 72 hours.  No results

## 2024-07-26 NOTE — CONSULTS
[facility] PHYSICIANS- [facility]  Consultation Report  PATIENT: Shauna Gaines  MEDICAL RECORD NUMBER: [id number]  DATE OF SERVICE:  7/26/2024  ATTENDING ELECTROPHYSIOLOGIST: Reji Duggan DO   PRIMARY ELECTROPHYSIOLOGIST: Chai Rivero MD   REFERRING PHYSICIAN: No ref. provider found and Ross Mercer MD  CHIEF COMPLAINT: Nausea and weakness.     HPI: This is a 89 y.o. 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  She is managed by Dr. Rivero on amiodarone 200 Mg twice daily, Lasix, Zofran, Mobic, Maxipime, Protonix, Colace, apixaban 2.5 Mg twice daily, Symbicort, Zocor, albuterol.  In March 2024 she was seen at [facility] for respiratory infection and diagnosed with sinus node dysfunction due to AF with pauses subsequently underwent placement of a [employer] Sincere dual-chamber pacemaker on 03/25/2024.  She was admitted to [facility] in June 2023 due to sepsis and a right lower extremity wound known infection and required incision and draining of the right leg abscess on June 5, 2024.  Wound culture showed pansensitive Enterococcus Faecalis patient was discharged to a nursing home on Zyvox.  She was then again admitted in June 2024 or with AF with RVR TTT at that time showed LVEF 55-60% she was again discharged to a nursing facility on Toprol all XL 50 Mg twice daily and apixaban.  Around this time the patient noted a scab at her pacemaker insertion site and once removed  monitor, amiodarone, and OAC, as well as IV antibiotics per ID.  On 7/25/2024, she presented with chief complaint of anorexia and fatigue.  She was admitted and treated with IV fluids.  On telemetry, she remains in sinus at 45-65 bpm.  Patient reportedly stopped taking amiodarone due to GI distress.    Assessment/plan:  nonvalvular paroxysmal AF  - Initially diagnosed in 3/2024.  - [id number] score = 4 (age, sex, HTN).  Recommend OAC in females with score of 2 or more.  DOAC preferred.  - Continue apixaban 2.5 mg twice daily.  While on DOAC, recommend annual monitoring of CBC and CMP.  - Amiodarone held due to poor appetite.  Restart when able, consider 200 mg every other day initially.  Continue to monitor on telemetry.  - Modifiable risk factors:  -- Obesity: BMI goal <27.  -- MICHELLE: Consider outpatient screening.  -- HTN: BP goal <130/80 mmHg.  -- EtOH: Avoid abuse/binge.  - Follow-up with established EP in approximately 3 months.  I will message office to arrange.  - EP service will sign off.    sinus bradycardia  - Asymptomatic.  - Wearing ZIO AT monitor.  No alerts to date.    Pacemaker infection sp pacemaker extraction (7/11/2024)  -On antibiotics per infectious disease.    Moderate-severe AS  - Established with Dr. Lau ([place] [facility]).    Right LE wound  -On antibiotics per infectious disease.    Reji Duggan DO  [facility] Cardiac Electrophysiology  [facility] [place]  [facility]

## 2024-07-26 NOTE — H&P
SYMBICORT 80-4.5 MCG/ACT AERO Inhale 2 puffs into the lungs in the morning and 2 puffs in the evening. 7/5/23   Stephania Hardy MD   simvastatin (ZOCOR) 20 MG tablet Take 1 tablet by mouth daily    Stephania Hardy MD   Cholecalciferol (VITAMIN D3) 50 MCG (2000 UT) CAPS Take 1 capsule by mouth daily    Stephania Hardy MD   Multiple Vitamins-Minerals (PROSIGHT) TABS Take 1 tablet by mouth daily  Patient not taking: Reported on 7/25/2024    Stephania Hardy MD   albuterol (PROVENTIL) (2.5 MG/3ML) 0.083% nebulizer solution Take 3 mLs by nebulization in the morning and at bedtime 1/14/19   Stephania Hardy MD       Allergies:    Adhesive tape    Social History:    reports that she quit smoking about 34 years ago. Her smoking use included cigarettes. She started smoking about 54 years ago. She has a 10.0 pack-year smoking history. She has never been exposed to tobacco smoke. She has never used smokeless tobacco. She reports that she does not currently use alcohol. She reports that she does not use drugs.    Family History:   family history is not on file.       PHYSICAL EXAM:  Vitals:  BP (!) 132/55   Pulse 53   Temp 98.7 °F (37.1 °C) (Oral)   Resp 15   SpO2 97%     General Appearance: frail appearing alert and oriented to person, place and time and in no acute distress  Skin: warm and dry  Head: normocephalic and atraumatic  Eyes: pupils equal, round, and reactive to light, extraocular eye movements intact, conjunctivae normal  Neck: neck supple and non tender without mass   Pulmonary/Chest: clear to auscultation bilaterally- no wheezes, rales or rhonchi, normal air movement, no respiratory distress  Cardiovascular: normal rate, normal S1 and S2 and no carotid bruits  Abdomen: soft, non-tender, non-distended, normal bowel sounds, no masses or organomegaly  Extremities: no cyanosis, no clubbing and no edema, bandages right leg  Neurologic: no cranial nerve deficit and speech  Eliquis  Severe pulmonary hypertension  Hypokalemia - replace   Elevated Troponin 19, repeat,  Chronic hypoxic respiratory failure secondary to COPD at baseline 3 L -breathing treatments  Chronic right lower extremity wound follows with vascular surgery as outpatient  Deconditioning PT/OT       Code Status: Limited, Yes to resuscitative medications; no to chest compressions, shock and intubation.   DVT prophylaxis: Eliquis         NOTE: This report was transcribed using voice recognition software. Every effort was made to ensure accuracy; however, inadvertent computerized transcription errors may be present.  Electronically signed by Zuly Duke MD on 7/25/2024 at 11:26 PM

## 2024-07-27 ENCOUNTER — APPOINTMENT (OUTPATIENT)
Dept: GENERAL RADIOLOGY | Age: 89
DRG: 640 | End: 2024-07-27
Payer: MEDICARE

## 2024-07-27 LAB
ANION GAP SERPL CALCULATED.3IONS-SCNC: 6 MMOL/L (ref 7–16)
B PARAP IS1001 DNA NPH QL NAA+NON-PROBE: NOT DETECTED
B PERT DNA SPEC QL NAA+PROBE: NOT DETECTED
B.E.: -1.6 MMOL/L (ref -3–3)
BASOPHILS # BLD: 0.06 K/UL (ref 0–0.2)
BASOPHILS NFR BLD: 1 % (ref 0–2)
BUN SERPL-MCNC: 21 MG/DL (ref 6–23)
C PNEUM DNA NPH QL NAA+NON-PROBE: NOT DETECTED
CALCIUM SERPL-MCNC: 7.4 MG/DL (ref 8.6–10.2)
CHLORIDE SERPL-SCNC: 110 MMOL/L (ref 98–107)
CO2 SERPL-SCNC: 26 MMOL/L (ref 22–29)
COHB: 0.7 % (ref 0–1.5)
CREAT SERPL-MCNC: 0.8 MG/DL (ref 0.5–1)
CRITICAL: ABNORMAL
DATE ANALYZED: ABNORMAL
DATE OF COLLECTION: ABNORMAL
EOSINOPHIL # BLD: 0.31 K/UL (ref 0.05–0.5)
EOSINOPHILS RELATIVE PERCENT: 5 % (ref 0–6)
ERYTHROCYTE [DISTWIDTH] IN BLOOD BY AUTOMATED COUNT: 15.5 % (ref 11.5–15)
FLUAV RNA NPH QL NAA+NON-PROBE: NOT DETECTED
FLUBV RNA NPH QL NAA+NON-PROBE: NOT DETECTED
GFR, ESTIMATED: 71 ML/MIN/1.73M2
GLUCOSE SERPL-MCNC: 72 MG/DL (ref 74–99)
HADV DNA NPH QL NAA+NON-PROBE: NOT DETECTED
HCO3: 25.1 MMOL/L (ref 22–26)
HCOV 229E RNA NPH QL NAA+NON-PROBE: NOT DETECTED
HCOV HKU1 RNA NPH QL NAA+NON-PROBE: NOT DETECTED
HCOV NL63 RNA NPH QL NAA+NON-PROBE: NOT DETECTED
HCOV OC43 RNA NPH QL NAA+NON-PROBE: NOT DETECTED
HCT VFR BLD AUTO: 31 % (ref 34–48)
HGB BLD-MCNC: 9.4 G/DL (ref 11.5–15.5)
HHB: 4.4 % (ref 0–5)
HMPV RNA NPH QL NAA+NON-PROBE: NOT DETECTED
HPIV1 RNA NPH QL NAA+NON-PROBE: NOT DETECTED
HPIV2 RNA NPH QL NAA+NON-PROBE: NOT DETECTED
HPIV3 RNA NPH QL NAA+NON-PROBE: NOT DETECTED
HPIV4 RNA NPH QL NAA+NON-PROBE: NOT DETECTED
IMM GRANULOCYTES # BLD AUTO: 0.04 K/UL (ref 0–0.58)
IMM GRANULOCYTES NFR BLD: 1 % (ref 0–5)
LAB: ABNORMAL
LYMPHOCYTES NFR BLD: 1.49 K/UL (ref 1.5–4)
LYMPHOCYTES RELATIVE PERCENT: 22 % (ref 20–42)
Lab: 945
M PNEUMO DNA NPH QL NAA+NON-PROBE: NOT DETECTED
MAGNESIUM SERPL-MCNC: 1.6 MG/DL (ref 1.6–2.6)
MCH RBC QN AUTO: 28.1 PG (ref 26–35)
MCHC RBC AUTO-ENTMCNC: 30.3 G/DL (ref 32–34.5)
MCV RBC AUTO: 92.5 FL (ref 80–99.9)
METHB: 0.5 % (ref 0–1.5)
MODE: ABNORMAL
MONOCYTES NFR BLD: 0.82 K/UL (ref 0.1–0.95)
MONOCYTES NFR BLD: 12 % (ref 2–12)
NEUTROPHILS NFR BLD: 60 % (ref 43–80)
NEUTS SEG NFR BLD: 4.12 K/UL (ref 1.8–7.3)
O2 SATURATION: 95.5 % (ref 92–98.5)
O2HB: 94.4 % (ref 94–97)
OPERATOR ID: 1768
PATIENT TEMP: 37 C
PCO2: 50.9 MMHG (ref 35–45)
PH BLOOD GAS: 7.31 (ref 7.35–7.45)
PLATELET # BLD AUTO: 165 K/UL (ref 130–450)
PMV BLD AUTO: 11.4 FL (ref 7–12)
PO2: 79.2 MMHG (ref 75–100)
POTASSIUM SERPL-SCNC: 3.4 MMOL/L (ref 3.5–5)
RBC # BLD AUTO: 3.35 M/UL (ref 3.5–5.5)
RSV RNA NPH QL NAA+NON-PROBE: NOT DETECTED
RV+EV RNA NPH QL NAA+NON-PROBE: NOT DETECTED
SARS-COV-2 RNA NPH QL NAA+NON-PROBE: NOT DETECTED
SODIUM SERPL-SCNC: 142 MMOL/L (ref 132–146)
SOURCE, BLOOD GAS: ABNORMAL
SPECIMEN DESCRIPTION: NORMAL
THB: 11.9 G/DL (ref 11.5–16.5)
TIME ANALYZED: 949
TROPONIN I SERPL HS-MCNC: 20 NG/L (ref 0–9)
WBC OTHER # BLD: 6.8 K/UL (ref 4.5–11.5)

## 2024-07-27 PROCEDURE — 83735 ASSAY OF MAGNESIUM: CPT

## 2024-07-27 PROCEDURE — 82805 BLOOD GASES W/O2 SATURATION: CPT

## 2024-07-27 PROCEDURE — 2700000000 HC OXYGEN THERAPY PER DAY

## 2024-07-27 PROCEDURE — 2580000003 HC RX 258: Performed by: INTERNAL MEDICINE

## 2024-07-27 PROCEDURE — 6370000000 HC RX 637 (ALT 250 FOR IP): Performed by: INTERNAL MEDICINE

## 2024-07-27 PROCEDURE — 71045 X-RAY EXAM CHEST 1 VIEW: CPT

## 2024-07-27 PROCEDURE — 36600 WITHDRAWAL OF ARTERIAL BLOOD: CPT

## 2024-07-27 PROCEDURE — 1200000000 HC SEMI PRIVATE

## 2024-07-27 PROCEDURE — 80048 BASIC METABOLIC PNL TOTAL CA: CPT

## 2024-07-27 PROCEDURE — 6360000002 HC RX W HCPCS: Performed by: INTERNAL MEDICINE

## 2024-07-27 PROCEDURE — 0202U NFCT DS 22 TRGT SARS-COV-2: CPT

## 2024-07-27 PROCEDURE — 85025 COMPLETE CBC W/AUTO DIFF WBC: CPT

## 2024-07-27 PROCEDURE — 93005 ELECTROCARDIOGRAM TRACING: CPT | Performed by: INTERNAL MEDICINE

## 2024-07-27 PROCEDURE — 6360000002 HC RX W HCPCS

## 2024-07-27 PROCEDURE — 84484 ASSAY OF TROPONIN QUANT: CPT

## 2024-07-27 PROCEDURE — 94640 AIRWAY INHALATION TREATMENT: CPT

## 2024-07-27 RX ORDER — PROCHLORPERAZINE EDISYLATE 5 MG/ML
10 INJECTION INTRAMUSCULAR; INTRAVENOUS EVERY 6 HOURS PRN
Status: DISCONTINUED | OUTPATIENT
Start: 2024-07-27 | End: 2024-07-28 | Stop reason: HOSPADM

## 2024-07-27 RX ORDER — AMLODIPINE BESYLATE 10 MG/1
10 TABLET ORAL ONCE
Status: COMPLETED | OUTPATIENT
Start: 2024-07-27 | End: 2024-07-27

## 2024-07-27 RX ORDER — AMIODARONE HYDROCHLORIDE 200 MG/1
200 TABLET ORAL EVERY OTHER DAY
Status: DISCONTINUED | OUTPATIENT
Start: 2024-07-27 | End: 2024-07-27

## 2024-07-27 RX ORDER — ACETAMINOPHEN 325 MG/1
650 TABLET ORAL EVERY 4 HOURS PRN
Status: DISCONTINUED | OUTPATIENT
Start: 2024-07-27 | End: 2024-07-28 | Stop reason: HOSPADM

## 2024-07-27 RX ORDER — MORPHINE SULFATE 2 MG/ML
2 INJECTION, SOLUTION INTRAMUSCULAR; INTRAVENOUS
Status: DISCONTINUED | OUTPATIENT
Start: 2024-07-27 | End: 2024-07-28 | Stop reason: HOSPADM

## 2024-07-27 RX ORDER — POTASSIUM CHLORIDE 7.45 MG/ML
10 INJECTION INTRAVENOUS
Status: DISCONTINUED | OUTPATIENT
Start: 2024-07-27 | End: 2024-07-27

## 2024-07-27 RX ORDER — MORPHINE SULFATE 4 MG/ML
4 INJECTION, SOLUTION INTRAMUSCULAR; INTRAVENOUS
Status: DISCONTINUED | OUTPATIENT
Start: 2024-07-27 | End: 2024-07-28 | Stop reason: HOSPADM

## 2024-07-27 RX ORDER — MORPHINE SULFATE 2 MG/ML
2 INJECTION, SOLUTION INTRAMUSCULAR; INTRAVENOUS ONCE
Status: COMPLETED | OUTPATIENT
Start: 2024-07-27 | End: 2024-07-27

## 2024-07-27 RX ORDER — FUROSEMIDE 10 MG/ML
INJECTION INTRAMUSCULAR; INTRAVENOUS
Status: COMPLETED
Start: 2024-07-27 | End: 2024-07-27

## 2024-07-27 RX ORDER — FUROSEMIDE 20 MG/1
20 TABLET ORAL DAILY
Status: DISCONTINUED | OUTPATIENT
Start: 2024-07-27 | End: 2024-07-27

## 2024-07-27 RX ORDER — GLYCOPYRROLATE 0.2 MG/ML
0.1 INJECTION INTRAMUSCULAR; INTRAVENOUS ONCE
Status: COMPLETED | OUTPATIENT
Start: 2024-07-27 | End: 2024-07-27

## 2024-07-27 RX ORDER — LOPERAMIDE HYDROCHLORIDE 2 MG/1
2 CAPSULE ORAL PRN
Status: DISCONTINUED | OUTPATIENT
Start: 2024-07-27 | End: 2024-07-28 | Stop reason: HOSPADM

## 2024-07-27 RX ORDER — POTASSIUM CHLORIDE 20 MEQ/1
40 TABLET, EXTENDED RELEASE ORAL ONCE
Status: DISCONTINUED | OUTPATIENT
Start: 2024-07-27 | End: 2024-07-27

## 2024-07-27 RX ORDER — GLYCOPYRROLATE 1 MG/1
1 TABLET ORAL 2 TIMES DAILY
Status: DISCONTINUED | OUTPATIENT
Start: 2024-07-27 | End: 2024-07-27

## 2024-07-27 RX ORDER — POLYETHYLENE GLYCOL 3350 17 G/17G
17 POWDER, FOR SOLUTION ORAL DAILY PRN
Status: DISCONTINUED | OUTPATIENT
Start: 2024-07-27 | End: 2024-07-28 | Stop reason: HOSPADM

## 2024-07-27 RX ORDER — FUROSEMIDE 10 MG/ML
40 INJECTION INTRAMUSCULAR; INTRAVENOUS ONCE
Status: COMPLETED | OUTPATIENT
Start: 2024-07-27 | End: 2024-07-27

## 2024-07-27 RX ORDER — LORAZEPAM 2 MG/ML
1 CONCENTRATE ORAL
Status: DISCONTINUED | OUTPATIENT
Start: 2024-07-27 | End: 2024-07-28 | Stop reason: HOSPADM

## 2024-07-27 RX ORDER — HYDRALAZINE HYDROCHLORIDE 20 MG/ML
10 INJECTION INTRAMUSCULAR; INTRAVENOUS EVERY 6 HOURS PRN
Status: DISCONTINUED | OUTPATIENT
Start: 2024-07-27 | End: 2024-07-27

## 2024-07-27 RX ORDER — ONDANSETRON 2 MG/ML
4 INJECTION INTRAMUSCULAR; INTRAVENOUS EVERY 6 HOURS PRN
Status: DISCONTINUED | OUTPATIENT
Start: 2024-07-27 | End: 2024-07-28 | Stop reason: HOSPADM

## 2024-07-27 RX ADMIN — FUROSEMIDE 40 MG: 10 INJECTION INTRAMUSCULAR; INTRAVENOUS at 10:14

## 2024-07-27 RX ADMIN — HYDRALAZINE HYDROCHLORIDE 10 MG: 20 INJECTION INTRAMUSCULAR; INTRAVENOUS at 09:56

## 2024-07-27 RX ADMIN — FUROSEMIDE 40 MG: 10 INJECTION, SOLUTION INTRAMUSCULAR; INTRAVENOUS at 10:14

## 2024-07-27 RX ADMIN — IPRATROPIUM BROMIDE AND ALBUTEROL SULFATE 1 DOSE: 2.5; .5 SOLUTION RESPIRATORY (INHALATION) at 10:24

## 2024-07-27 RX ADMIN — MICONAZOLE NITRATE: 20.6 POWDER TOPICAL at 11:04

## 2024-07-27 RX ADMIN — PROCHLORPERAZINE EDISYLATE 10 MG: 5 INJECTION INTRAMUSCULAR; INTRAVENOUS at 12:27

## 2024-07-27 RX ADMIN — MORPHINE SULFATE 2 MG: 2 INJECTION, SOLUTION INTRAMUSCULAR; INTRAVENOUS at 23:26

## 2024-07-27 RX ADMIN — MORPHINE SULFATE 2 MG: 2 INJECTION, SOLUTION INTRAMUSCULAR; INTRAVENOUS at 15:22

## 2024-07-27 RX ADMIN — POTASSIUM CHLORIDE 10 MEQ: 7.46 INJECTION, SOLUTION INTRAVENOUS at 10:49

## 2024-07-27 RX ADMIN — AMLODIPINE BESYLATE 10 MG: 10 TABLET ORAL at 08:34

## 2024-07-27 RX ADMIN — ARFORMOTEROL TARTRATE 15 MCG: 15 SOLUTION RESPIRATORY (INHALATION) at 10:24

## 2024-07-27 RX ADMIN — ACETAMINOPHEN 650 MG: 325 TABLET ORAL at 06:12

## 2024-07-27 RX ADMIN — MORPHINE SULFATE 2 MG: 2 INJECTION, SOLUTION INTRAMUSCULAR; INTRAVENOUS at 10:54

## 2024-07-27 RX ADMIN — GLYCOPYRROLATE 0.1 MG: 0.2 INJECTION INTRAMUSCULAR; INTRAVENOUS at 14:36

## 2024-07-27 RX ADMIN — SODIUM CHLORIDE, PRESERVATIVE FREE 10 ML: 5 INJECTION INTRAVENOUS at 11:04

## 2024-07-27 RX ADMIN — HYDROCODONE BITARTRATE AND ACETAMINOPHEN 1 TABLET: 5; 325 TABLET ORAL at 10:30

## 2024-07-27 RX ADMIN — MORPHINE SULFATE 2 MG: 2 INJECTION, SOLUTION INTRAMUSCULAR; INTRAVENOUS at 21:04

## 2024-07-27 RX ADMIN — PANTOPRAZOLE SODIUM 40 MG: 40 TABLET, DELAYED RELEASE ORAL at 06:13

## 2024-07-27 RX ADMIN — HYDROCODONE BITARTRATE AND ACETAMINOPHEN 1 TABLET: 5; 325 TABLET ORAL at 03:47

## 2024-07-27 RX ADMIN — PIPERACILLIN AND TAZOBACTAM 3375 MG: 3; .375 INJECTION, POWDER, LYOPHILIZED, FOR SOLUTION INTRAVENOUS at 05:00

## 2024-07-27 RX ADMIN — ONDANSETRON 4 MG: 2 INJECTION INTRAMUSCULAR; INTRAVENOUS at 08:21

## 2024-07-27 ASSESSMENT — PAIN - FUNCTIONAL ASSESSMENT
PAIN_FUNCTIONAL_ASSESSMENT: ACTIVITIES ARE NOT PREVENTED
PAIN_FUNCTIONAL_ASSESSMENT: PREVENTS OR INTERFERES SOME ACTIVE ACTIVITIES AND ADLS
PAIN_FUNCTIONAL_ASSESSMENT: ACTIVITIES ARE NOT PREVENTED

## 2024-07-27 ASSESSMENT — PAIN DESCRIPTION - DESCRIPTORS
DESCRIPTORS: ACHING;DISCOMFORT;SORE
DESCRIPTORS: PATIENT UNABLE TO DESCRIBE
DESCRIPTORS: ACHING;DISCOMFORT;SORE
DESCRIPTORS: ACHING;SHARP;SORE

## 2024-07-27 ASSESSMENT — PAIN DESCRIPTION - LOCATION
LOCATION: GENERALIZED
LOCATION: FOOT;LEG
LOCATION: GENERALIZED
LOCATION: LEG

## 2024-07-27 ASSESSMENT — PAIN DESCRIPTION - ONSET: ONSET: ON-GOING

## 2024-07-27 ASSESSMENT — PAIN SCALES - GENERAL
PAINLEVEL_OUTOF10: 7
PAINLEVEL_OUTOF10: 5
PAINLEVEL_OUTOF10: 7
PAINLEVEL_OUTOF10: 6

## 2024-07-27 ASSESSMENT — PAIN DESCRIPTION - ORIENTATION
ORIENTATION: RIGHT;LEFT
ORIENTATION: OTHER (COMMENT)
ORIENTATION: RIGHT

## 2024-07-27 ASSESSMENT — PAIN SCALES - WONG BAKER: WONGBAKER_NUMERICALRESPONSE: NO HURT

## 2024-07-27 ASSESSMENT — PAIN DESCRIPTION - PAIN TYPE: TYPE: ACUTE PAIN

## 2024-07-27 ASSESSMENT — PAIN DESCRIPTION - FREQUENCY: FREQUENCY: CONTINUOUS

## 2024-07-27 NOTE — PLAN OF CARE
Problem: ABCDS Injury Assessment  Goal: Absence of physical injury  Outcome: Progressing     Problem: Skin/Tissue Integrity  Goal: Absence of new skin breakdown  Description: 1.  Monitor for areas of redness and/or skin breakdown  2.  Assess vascular access sites hourly  3.  Every 4-6 hours minimum:  Change oxygen saturation probe site  4.  Every 4-6 hours:  If on nasal continuous positive airway pressure, respiratory therapy assess nares and determine need for appliance change or resting period.  Outcome: Progressing     Problem: Safety - Adult  Goal: Free from fall injury  Outcome: Progressing     Problem: Pain  Goal: Verbalizes/displays adequate comfort level or baseline comfort level  Outcome: Progressing

## 2024-07-27 NOTE — PROGRESS NOTES
Complaint of mid to right chest pain 6/10 post breathing treatment. EKG done and NP on call perfect serve. EKG printed and put on soft chart, transmitted as well in the epic. VS stable.Pain resolve gradually

## 2024-07-27 NOTE — PROGRESS NOTES
[facility] Hospitalist Progress Note    Admitting Date and Time: 7/25/2024  3:49 PM  Admit Dx: Hypokalemia [E87.6]  Generalized weakness [R53.1]  Other fatigue [R53.83]    Synopsis: Patient is 89-year-old lady with past medical history of ALS, COPD, chronic hypoxic respiratory failure uses 3 L baseline oxygen, hyperlipidemia, hypertension, history of sick sinus syndrome s/p pacemaker placement and A-fib on anticoagulation with Eliquis.  She was recently admitted and discharged on 7/15, she was treated for pacemaker site infection, pacemaker was removed, pacemaker lead vegetation with Pseudomonas, pansensitive Pseudomonas and she was started on cefepime by ID and discharged with PICC line to continue cefepime for 4 weeks.  She was also started on amiodarone by EP.  On that admission patient reported nausea and poor appetite and amiodarone dose was adjusted with the plan of stopping amiodarone if patient continues to have nausea and vomiting.  Patient now reported increased fatigue and weakness over the last few days with nausea and poor oral intake.  She was brought to ER and was found to have significant hypokalemia with potassium of 2.9, she was given potassium supplementation in ED, IV fluid bolus and continued on cefepime.  She has been admitted under hospitalist service with consult to EP in setting of intractable nausea and poor oral intake likely due to amiodarone, amiodarone has been currently placed on hold.    Subjective:  Patient is being followed for Hypokalemia [E87.6]  Generalized weakness [R53.1]  Other fatigue [R53.83]     Daughter at bedside.  Patient has been reporting since yesterday evening that she is just overall not feeling well.  Per daughter at bedside she describes it as a feeling of impending doom.  Her blood pressure has been elevated this morning.  She is also complaining of some chest heaviness.  On 2 L supplemental oxygen      ROS: denies fever, chills, cp, sob, n/v, HA unless

## 2024-07-27 NOTE — PLAN OF CARE
Problem: ABCDS Injury Assessment  Goal: Absence of physical injury  7/27/2024 0205 by Erika Evans, RN  Outcome: Progressing     Problem: Skin/Tissue Integrity  Goal: Absence of new skin breakdown  Description: 1.  Monitor for areas of redness and/or skin breakdown  2.  Assess vascular access sites hourly  3.  Every 4-6 hours minimum:  Change oxygen saturation probe site  4.  Every 4-6 hours:  If on nasal continuous positive airway pressure, respiratory therapy assess nares and determine need for appliance change or resting period.  7/27/2024 0205 by Erika Evans, RN  Outcome: Progressing     Problem: Safety - Adult  Goal: Free from fall injury  7/27/2024 0205 by Erika Evans RN  Outcome: Progressing     Problem: Pain  Goal: Verbalizes/displays adequate comfort level or baseline comfort level  Outcome: Progressing

## 2024-07-27 NOTE — PROGRESS NOTES
[facility]  [facility]  Progress Note      Chief Complaint   Patient presents with    Fatigue     Patients  states she has had weakness since Friday patient is typically aox4 now aox3 she wears 3 L o2 at home is on blood thinners, patient is not sleeping or eating.  Family feels like health is declining, had a pace maker that became infected and was removed now on heart monitor       SUBJECTIVE:    Patient is tolerating medications. No reported adverse drug reactions.  No nausea, vomiting, diarrhea.    Review of systems:  As stated above in the chief complaint, otherwise negative.    Medications:  Scheduled Meds:   Glycopyrrolate  0.1 mg IntraVENous Once     Continuous Infusions:  PRN Meds:polyethylene glycol, loperamide, morphine **OR** morphine, ondansetron, acetaminophen, LORazepam, prochlorperazine    OBJECTIVE:  BP (!) 181/46   Pulse 76   Temp 98 °F (36.7 °C) (Temporal)   Resp 16   Ht 1.524 m (5')   Wt 45.4 kg (100 lb 1.4 oz)   SpO2 94%   BMI 19.55 kg/m²   Temp  Av.4 °F (36.9 °C)  Min: 97.6 °F (36.4 °C)  Max: 99.7 °F (37.6 °C)  Constitutional: The patient is awake, alert, and oriented.   Skin: Warm and dry. No rashes were noted. No jaundice.  HEENT: Eyes show round, and reactive pupils. Moist mucous membranes, no ulcerations, no thrush.   Neck: Supple to movements. No lymphadenopathy.   Chest: No use of accessory muscles to breathe. Symmetrical expansion. Auscultation reveals no wheezing, crackles, or rhonchi.   Cardiovascular: S1 and S2 are rhythmic and regular. No murmurs appreciated.   Abdomen: Positive bowel sounds to auscultation. Benign to palpation. No masses felt. No hepatosplenomegaly.  Genitourinary: No pain in lower abdomen   Extremities: No clubbing, no cyanosis, no edema.  Musculoskeletal: No pain in range of motion of any joints  Neurological: Following commands, no focal neurodeficit  Lines: peripheral    Laboratory and Tests Review:  Lab Results    Component Value Date    WBC 6.8 07/27/2024    WBC 7.5 07/25/2024    WBC 8.1 07/22/2024    HGB 9.4 (L) 07/27/2024    HCT 31.0 (L) 07/27/2024    MCV 92.5 07/27/2024     07/27/2024     Lab Results   Component Value Date    NEUTROABS 4.12 07/27/2024    NEUTROABS 4.97 07/25/2024    NEUTROABS 4.66 07/22/2024     No results found for: \"CRPHS\"  Lab Results   Component Value Date    ALT 8 07/25/2024    AST 18 07/25/2024    ALKPHOS 85 07/25/2024    BILITOT 0.3 07/25/2024     Lab Results   Component Value Date/Time     07/27/2024 06:00 AM    K 3.4 07/27/2024 06:00 AM     07/27/2024 06:00 AM    CO2 26 07/27/2024 06:00 AM    BUN 21 07/27/2024 06:00 AM    CREATININE 0.8 07/27/2024 06:00 AM    CREATININE 0.8 07/26/2024 08:30 AM    CREATININE 0.9 07/26/2024 12:41 AM    LABGLOM 71 07/27/2024 06:00 AM    LABGLOM >60 07/30/2023 11:19 PM    GLUCOSE 72 07/27/2024 06:00 AM    CALCIUM 7.4 07/27/2024 06:00 AM    BILITOT 0.3 07/25/2024 04:31 PM    ALKPHOS 85 07/25/2024 04:31 PM    AST 18 07/25/2024 04:31 PM    ALT 8 07/25/2024 04:31 PM     Lab Results   Component Value Date    CRP 5.0 07/22/2024    CRP 8.0 (H) 07/18/2024    CRP 11.0 (H) 06/09/2024     Lab Results   Component Value Date    SEDRATE 9 07/22/2024    SEDRATE 22 (H) 07/18/2024    SEDRATE 9 06/09/2024     Radiology:      Microbiology:   No results found for: \"BC\", \"ORG\"  No results found for: \"BLOODCULT2\", \"ORG\"  No results found for: \"WNDABS\"  No results found for: \"RESPSMEAR\"      Component Value Date/Time    MPNEUMO Not Detected 07/27/2024 1018     No results found for: \"CULTRESP\"  No results found for: \"CXCATHTIP\"  No results found for: \"BFCS\"  No results found for: \"CXSURG\"  No results found for: \"LABURIN\"  No results found for: \"MRSAC\"    ASSESSMENT:  Nausea & vomiting   Pseudomonas PPM Pocket Infection   PPM lead infection with Pseudomonas   H/O tachy Samuel syndrome      Plan:    Continue Zosyn 3.375 mg Q8 until 08/08   Agree with EP to continue

## 2024-07-28 VITALS
OXYGEN SATURATION: 93 % | TEMPERATURE: 97 F | DIASTOLIC BLOOD PRESSURE: 41 MMHG | HEIGHT: 60 IN | WEIGHT: 100.09 LBS | SYSTOLIC BLOOD PRESSURE: 117 MMHG | BODY MASS INDEX: 19.65 KG/M2 | RESPIRATION RATE: 16 BRPM | HEART RATE: 60 BPM

## 2024-07-28 PROCEDURE — 2700000000 HC OXYGEN THERAPY PER DAY

## 2024-07-28 PROCEDURE — 6360000002 HC RX W HCPCS: Performed by: INTERNAL MEDICINE

## 2024-07-28 PROCEDURE — 99239 HOSP IP/OBS DSCHRG MGMT >30: CPT | Performed by: INTERNAL MEDICINE

## 2024-07-28 RX ADMIN — MORPHINE SULFATE 4 MG: 4 INJECTION, SOLUTION INTRAMUSCULAR; INTRAVENOUS at 01:48

## 2024-07-28 RX ADMIN — PROCHLORPERAZINE EDISYLATE 10 MG: 5 INJECTION INTRAMUSCULAR; INTRAVENOUS at 14:46

## 2024-07-28 RX ADMIN — MORPHINE SULFATE 4 MG: 4 INJECTION, SOLUTION INTRAMUSCULAR; INTRAVENOUS at 14:46

## 2024-07-28 RX ADMIN — MORPHINE SULFATE 4 MG: 4 INJECTION, SOLUTION INTRAMUSCULAR; INTRAVENOUS at 09:41

## 2024-07-28 RX ADMIN — MORPHINE SULFATE 4 MG: 4 INJECTION, SOLUTION INTRAMUSCULAR; INTRAVENOUS at 04:26

## 2024-07-28 RX ADMIN — PROCHLORPERAZINE EDISYLATE 10 MG: 5 INJECTION INTRAMUSCULAR; INTRAVENOUS at 05:21

## 2024-07-28 ASSESSMENT — PAIN SCALES - GENERAL: PAINLEVEL_OUTOF10: 9

## 2024-07-28 NOTE — PROGRESS NOTES
[facility] Hospitalist Progress Note    Admitting Date and Time: 7/25/2024  3:49 PM  Admit Dx: Hypokalemia [E87.6]  Generalized weakness [R53.1]  Other fatigue [R53.83]    Synopsis: Patient is 89-year-old lady with past medical history of ALS, COPD, chronic hypoxic respiratory failure uses 3 L baseline oxygen, hyperlipidemia, hypertension, history of sick sinus syndrome s/p pacemaker placement and A-fib on anticoagulation with Eliquis.  She was recently admitted and discharged on 7/15, she was treated for pacemaker site infection, pacemaker was removed, pacemaker lead vegetation with Pseudomonas, pansensitive Pseudomonas and she was started on cefepime by ID and discharged with PICC line to continue cefepime for 4 weeks.  She was also started on amiodarone by EP.  On that admission patient reported nausea and poor appetite and amiodarone dose was adjusted with the plan of stopping amiodarone if patient continues to have nausea and vomiting.  Patient now reported increased fatigue and weakness over the last few days with nausea and poor oral intake.  She was brought to ER and was found to have significant hypokalemia with potassium of 2.9, she was given potassium supplementation in ED, IV fluid bolus and continued on cefepime.  She has been admitted under hospitalist service with consult to EP in setting of intractable nausea and poor oral intake likely due to amiodarone, amiodarone has been currently placed on hold.  Family has decided to transition to hospice care, waiting for arrangements to be made at home.    Subjective:  Patient is being followed for Hypokalemia [E87.6]  Generalized weakness [R53.1]  Other fatigue [R53.83]     Daughter at bedside.  Patient states she is not doing well though she appears much more comfortable than yesterday.  Oral intake per daughter at bedside has been poor, patient does ask for water and pudding.      ROS: denies fever, chills, cp, sob, n/v, HA unless stated above.

## 2024-07-28 NOTE — PROGRESS NOTES
[facility]  [facility]  Progress Note      Chief Complaint   Patient presents with    Fatigue     Patients  states she has had weakness since Friday patient is typically aox4 now aox3 she wears 3 L o2 at home is on blood thinners, patient is not sleeping or eating.  Family feels like health is declining, had a pace maker that became infected and was removed now on heart monitor       SUBJECTIVE:    Patient is tolerating medications. No reported adverse drug reactions.  No nausea, vomiting, diarrhea.    Review of systems:  As stated above in the chief complaint, otherwise negative.    Medications:  Scheduled Meds:      Continuous Infusions:  PRN Meds:polyethylene glycol, loperamide, morphine **OR** morphine, ondansetron, acetaminophen, LORazepam, prochlorperazine    OBJECTIVE:  BP (!) 117/41   Pulse 60   Temp 97 °F (36.1 °C) (Temporal)   Resp 16   Ht 1.524 m (5')   Wt 45.4 kg (100 lb 1.4 oz)   SpO2 93%   BMI 19.55 kg/m²   Temp  Av.3 °F (36.3 °C)  Min: 97 °F (36.1 °C)  Max: 97.6 °F (36.4 °C)  Constitutional: The patient is awake, alert, and oriented.   Skin: Warm and dry. No rashes were noted. No jaundice.  HEENT: Eyes show round, and reactive pupils. Moist mucous membranes, no ulcerations, no thrush.   Neck: Supple to movements. No lymphadenopathy.   Chest: No use of accessory muscles to breathe. Symmetrical expansion. Auscultation reveals no wheezing, crackles, or rhonchi.   Cardiovascular: S1 and S2 are rhythmic and regular. No murmurs appreciated.   Abdomen: Positive bowel sounds to auscultation. Benign to palpation. No masses felt. No hepatosplenomegaly.  Genitourinary: No pain in lower abdomen   Extremities: No clubbing, no cyanosis, no edema.  Musculoskeletal: No pain in range of motion of any joints  Neurological: Following commands, no focal neurodeficit  Lines: peripheral    Laboratory and Tests Review:  Lab Results   Component Value Date    WBC 6.8 2024  normal  Family decided to make hospice  ID will sign off    Daron Rogers MD  12:41 PM  7/28/2024

## 2024-07-28 NOTE — DISCHARGE SUMMARY
[facility] Hospitalist Physician Discharge Summary       No follow-up provider specified.    Activity level: As tolerated     Dispo: Hospice home    Condition on discharge: Terminal    Patient ID:  Shauna Gaines  [id number]  89 y.o.  1935    Admit date: 7/25/2024    Discharge date and time:  7/28/2024  9:58 AM    Admission Diagnoses: Principal Problem:    Hypokalemia  Active Problems:    Paroxysmal atrial fibrillation (HCC)    Sinus bradycardia    Failure to thrive in adult    Wound infection  Resolved Problems:    * No resolved hospital problems. *      Discharge Diagnoses: Principal Problem:    Hypokalemia  Active Problems:    Paroxysmal atrial fibrillation (HCC)    Sinus bradycardia    Failure to thrive in adult    Wound infection  Resolved Problems:    * No resolved hospital problems. *      Consults:  IP CONSULT TO INFECTIOUS DISEASES  IP CONSULT TO ELECTROPHYSIOLOGY  IP CONSULT TO HOSPICE    Hospital Course:      Patient is 89-year-old lady with past medical history of ALS, COPD, chronic hypoxic respiratory failure uses 3 L baseline oxygen, hyperlipidemia, hypertension, history of sick sinus syndrome s/p pacemaker placement and A-fib on anticoagulation with Eliquis.  She was recently admitted and discharged on 7/15, she was treated for pacemaker site infection, pacemaker was removed, pacemaker lead vegetation with Pseudomonas, pansensitive Pseudomonas and she was started on cefepime by ID and discharged with PICC line to continue cefepime for 4 weeks.  She was also started on amiodarone by EP.  On that admission patient reported nausea and poor appetite and amiodarone dose was adjusted with the plan of stopping amiodarone if patient continues to have nausea and vomiting.  Patient now reported increased fatigue and weakness over the last few days with nausea and poor oral intake.  She was brought to ER and was found to have significant hypokalemia with potassium of 2.9 along with CORDELL, she was given    CALCIUM 8.2* 8.5* 7.4*       Recent Labs     07/25/24  1631 07/27/24  0600   WBC 7.5 6.8   RBC 4.06 3.35*   HGB 11.1* 9.4*   HCT 36.6 31.0*   MCV 90.1 92.5   MCH 27.3 28.1   MCHC 30.3* 30.3*   RDW 15.1* 15.5*    165   MPV 10.6 11.4       No results for input(s): \"POCGLU\" in the last 72 hours.    Imaging:  XR CHEST PORTABLE    Result Date: 7/25/2024  EXAMINATION: ONE XRAY VIEW OF THE CHEST 7/25/2024 5:57 pm COMPARISON: July 14, 2024 HISTORY: ORDERING SYSTEM PROVIDED HISTORY: fatigue TECHNOLOGIST PROVIDED HISTORY: Reason for exam:->fatigue FINDINGS: Right PICC line is present with distal tip at level of SVC.  No pneumothorax. Chronic irregular interstitial opacities are present bilaterally.  No focal airspace opacity or pleural effusion.  The heart is normal size.  No pneumothorax.  Improved aeration in lung bases compared to prior.  Electronic device overlies the right hemithorax.     1. Redemonstration of chronic irregular opacities bilaterally. 2. No focal pneumonia or pleural effusion. 3. Improved aeration in the lung bases compared to prior from July 14th.       Patient Instructions:      Medication List        STOP taking these medications      albuterol (2.5 MG/3ML) 0.083% nebulizer solution  Commonly known as: PROVENTIL     amiodarone 200 MG tablet  Commonly known as: CORDARONE     cefepime  infusion  Commonly known as: MAXIPIME     docusate sodium 100 MG capsule  Commonly known as: COLACE     Eliquis 2.5 MG Tabs tablet  Generic drug: apixaban     furosemide 20 MG tablet  Commonly known as: LASIX     meloxicam 7.5 MG tablet  Commonly known as: MOBIC     ondansetron 4 MG disintegrating tablet  Commonly known as: ZOFRAN-ODT     pantoprazole 40 MG tablet  Commonly known as: PROTONIX     Prosight Tabs     simvastatin 20 MG tablet  Commonly known as: ZOCOR     Symbicort 80-4.5 MCG/ACT Aero  Generic drug: budesonide-formoterol     TYLENOL/CODEINE #3 300-30 MG per tablet  Generic drug:

## 2024-07-28 NOTE — PROGRESS NOTES
Hospice consult received and chart reviewed. Visit made to bedside, pt was resting in bed, with family at bedside.  Met with justine in family waiting room with lloyd on speaker phone for conversation.   The hospice benefit and philosophy were explained including that hospice is end of life care in which, per Medicare, a patient has a terminal diagnosis that life expectancy would be 6 months or less. Explained that once in hospice care, all aggressive treatments would be stopped and allow nature to takes its course with focus on comfort care for the patient.  Explained hospice services at home, at [facility] with room/board private pay unless patient has Medicaid and the Hospice House for short-term symptom management and respite.  Spoke with Dr. Alonzo who accepted pt for GIP level of care at the hospice house.    PAS set for 4p-5p  N2N given to hospice house   Transport docs in soft chart   Please leave IV in place and medicate prior to transport    Thank you!  Electronically signed by Madai Eduardo RN on 7/28/2024 at 1:30 PM  [phone]

## 2024-07-28 NOTE — PLAN OF CARE
Problem: ABCDS Injury Assessment  Goal: Absence of physical injury  7/28/2024 1037 by Roseann Steele RN  Outcome: Adequate for Discharge     Problem: Skin/Tissue Integrity  Goal: Absence of new skin breakdown  Description: 1.  Monitor for areas of redness and/or skin breakdown  2.  Assess vascular access sites hourly  3.  Every 4-6 hours minimum:  Change oxygen saturation probe site  4.  Every 4-6 hours:  If on nasal continuous positive airway pressure, respiratory therapy assess nares and determine need for appliance change or resting period.  7/28/2024 1037 by Roseann Steele, RN  Outcome: Adequate for Discharge     Problem: Safety - Adult  Goal: Free from fall injury  7/28/2024 1037 by Roseann Steele, RN  Outcome: Adequate for Discharge     Problem: Pain  Goal: Verbalizes/displays adequate comfort level or baseline comfort level  7/28/2024 1037 by Roseann Steele, RN  Outcome: Adequate for Discharge

## 2024-07-29 LAB
EKG ATRIAL RATE: 73 BPM
EKG P AXIS: 47 DEGREES
EKG P-R INTERVAL: 164 MS
EKG Q-T INTERVAL: 440 MS
EKG QRS DURATION: 70 MS
EKG QTC CALCULATION (BAZETT): 484 MS
EKG R AXIS: -43 DEGREES
EKG T AXIS: 69 DEGREES
EKG VENTRICULAR RATE: 73 BPM

## 2024-07-30 LAB
EKG ATRIAL RATE: 64 BPM
EKG P-R INTERVAL: 144 MS
EKG Q-T INTERVAL: 384 MS
EKG QRS DURATION: 70 MS
EKG QTC CALCULATION (BAZETT): 396 MS
EKG R AXIS: -6 DEGREES
EKG T AXIS: 74 DEGREES
EKG VENTRICULAR RATE: 64 BPM

## 2024-07-30 PROCEDURE — 93010 ELECTROCARDIOGRAM REPORT: CPT | Performed by: INTERNAL MEDICINE

## 2024-08-06 NOTE — PROGRESS NOTES
Physician Progress Note      PATIENT:               MARLA THOMPSON  CSN #:                  [id number]  :                       1935  ADMIT DATE:       2024 3:49 PM  DISCH DATE:        2024 5:57 PM  RESPONDING  PROVIDER #:        Melanie Montgomery MD          QUERY TEXT:    Patient admitted with hypokalemia. Per Op note dated   documentation of   Excisional debridement to subcutaneous tissue.  The patient was then   devitalized tissue debrided post excisional debridement. To accurately reflect   the post procedure performed please document if debridement was excisional or   nonexcisional and the deepest depth of tissue removed as down to and   including:    The medical record reflects the following:  Risk Factors: ALS, chronic right lower extremity wound S/P surgery.  Clinical Indicators: Post debridment \" Devitalized Tissue Debrided:  fibrin,   biofilm, slough, and exudate to stimulate bleeding to  promote healing, post debridement good bleeding base and wound edges noted\".  Treatment: Surgery, ALGINATE AG to wound bed and cover with ABD pad and-   adhere with tape, change daily.    Thank you  Maryam Rice RN, BSN, [facility]  [phone]  Options provided:  -- Nonexcisional debridement of skin  -- Excisional debridement of skin  -- Nonexcisional debridement of subcutaneous tissue  -- Excisional debridement of subcutaneous tissue  -- Nonexcisional debridement of fascia  -- Excisional debridement of fascia  -- Nonexcisional debridement of muscle  -- Excisional debridement of muscle  -- Nonexcisional debridement of bone  -- Excisional debridement of bone  -- Other - I will add my own diagnosis  -- Disagree - Not applicable / Not valid  -- Disagree - Clinically unable to determine / Unknown  -- Refer to Clinical Documentation Reviewer    PROVIDER RESPONSE TEXT:    Non-excisional debridement of skin was performed of *** (site) during   procedure on *** (date).    Query created by: Maryam Rice on 2024

## 2024-08-07 NOTE — PROGRESS NOTES
Physician Progress Note      PATIENT:               MARLA THOMPSON  CSN #:                  [id number]  :                       1935  ADMIT DATE:       2024 3:49 PM  DISCH DATE:        2024 5:57 PM  RESPONDING  PROVIDER #:        Melanie Montgomery MD          QUERY TEXT:    Patient admitted with hypokalemia. Per Op note dated   documentation of   Excisional debridement to subcutaneous tissue.  The patient was then   devitalized tissue debrided post excisional debridement. To accurately reflect   the post procedure performed please document if debridement was excisional or   nonexcisional and the deepest depth of tissue removed as down to and   including:    The medical record reflects the following:  Risk Factors: ALS, chronic right lower extremity wound S/P surgery.  Clinical Indicators: Post debridment \" Devitalized Tissue Debrided:  fibrin,   biofilm, slough, and exudate to stimulate bleeding to  promote healing, post debridement good bleeding base and wound edges noted\".  Treatment: Surgery, ALGINATE AG to wound bed and cover with ABD pad and-   adhere with tape, change daily.    Thank you  Maryam Rice RN, BSN, [facility]  [phone]  Options provided:  -- Nonexcisional debridement of skin  -- Excisional debridement of skin  -- Other - I will add my own diagnosis  -- Disagree - Not applicable / Not valid  -- Disagree - Clinically unable to determine / Unknown  -- Refer to Clinical Documentation Reviewer    PROVIDER RESPONSE TEXT:    Non-excisional debridement of skin left upper chest was performed of during   procedure on .    Query created by: Maryam Rice on 2024 9:06 AM      Electronically signed by:  Melanie Montgomery MD 2024 11:06 AM

## 2024-08-09 DIAGNOSIS — I48.0 PAROXYSMAL ATRIAL FIBRILLATION (HCC): Primary | ICD-10-CM

## 2024-08-09 DIAGNOSIS — I48.0 PAROXYSMAL ATRIAL FIBRILLATION (HCC): ICD-10-CM

## 2024-08-10 LAB
EKG ATRIAL RATE: 56 BPM
EKG P-R INTERVAL: 152 MS
EKG Q-T INTERVAL: 460 MS
EKG QRS DURATION: 74 MS
EKG QTC CALCULATION (BAZETT): 443 MS
EKG R AXIS: -20 DEGREES
EKG T AXIS: 58 DEGREES
EKG VENTRICULAR RATE: 56 BPM

## 2024-12-21 NOTE — PROGRESS NOTES
history on file. REVIEW OF SYSTEMS:    CONSTITUTIONAL:  negative for  fevers, chills, sweats and fatigue  EYES: negative for dipolpia or acute vision loss. RESPIRATORY:  negative for  dry cough, cough with sputum, dyspnea, wheezing and chest pain  HENT:negative for pain, headache, difficulty swallowing or nose bleeds. CARDIOVASCULAR:  negative for  chest pain, dyspnea, palpitations, syncope  GASTROINTESTINAL:  negative for nausea, vomiting, change in bowel habits, diarrhea, constipation and abdominal pain  EXTREMITIES: negative for edema  MUSCULOSKELETAL: negative for muscle weakness  SKIN: positive for lesion negative for itching or rashes. HEME: negative for easy brusing or bleeding  BEHAVIOR/PSYCH:  negative for poor appetite, increased appetite, decreased sleep and poor concentration  All other systems negative      PHYSICAL EXAM:    VITALS:  Ht 5' 1\" (1.549 m)   Wt 100 lb (45.4 kg)   BMI 18.89 kg/m²   CONSTITUTIONAL:  awake, alert, cooperative, no apparent distress, and appears stated age  EYES: PERRLA, EOMI, no signs of occular infection  LUNGS:  No increased work of breathing, good air exchange,   CARDIOVASCULAR:  Normal apical impulse, regular rate and rhythm,   EXTREMITIES: no signs of clubbing or cyanosis. MUSCULOSKELETAL: negative for flaccid muscle tone or spastic movements. NEURO: Cranial nerves II-XII grossly intact. No signs of agitated mood. SKIN: Squamous Cell Cancer of left forearm-  15mm x 15mm,  pink in color, irregular border, notraised, no signs of bleeding,drainage or infection. Non tender to palpation.      DATA:    Labs: CBC: No results found for: WBC, RBC, HGB, HCT, MCV, MCH, MCHC, RDW, PLT, MPV  BMP:  No results found for: NA, K, CL, CO2, BUN, LABALBU, CREATININE, CALCIUM, GFRAA, LABGLOM, GLUCOSE, GLU    Radiology Review:  No radiology needed at this time  Pathology Review:  Pathology reviewed   SCC left forearm    IMPRESSION/RECOMMENDATIONS:        Diagnosis  -) Squamous Cell Cancer of left forearm      -The patient was counseled on their pathology results and the need for surgical   intervention.   -We will plan to proceed and have the lesion formely excised with margins in the OFFICe. -The patient will not  require frozen sections in the operating room  -The patient will not require pre-op clearance from their PCP. -Excision of left forearm Squamous Cell Cancer with possible local tissue rearrangement, possible full thickness skin graft.    -The risks, benefits and options were discussed with the pt. The risks included but not limited to pain, bleeding, infection, heavy scarring, damage to surrounding structures, fluid collections, asymmetry, and need for further procedures. All of Her questions were answered to their satisfaction and She agrees to proceed with the procedure. Photos obtained    Follow up day of procedure. Attending Physician Statement  I have discussed the case, including pertinent history and exam findings with the resident. I have seen and examined the patient and the key elements of all parts of the encounter have been performed by me. I agree with the assessment, exam, plan and orders as documented by the resident. I attest that the patient was seen and examined by me, and concur with the documentation above. I agree with the assessment and the plan outlined. This document is generated, in part, by voice recognition software and thus  syntax and grammatical errors are possible.     Filipe Ramos PAST MEDICAL HISTORY:  Degenerative disc disease, thoracic     Gastroesophageal reflux disease, esophagitis presence not specified     Intractable headache, unspecified chronicity pattern, unspecified headache type     Major depressive disorder with single episode, in full remission previously treated with zyprexa, celexa and lexapro    Neuropathy right lower extremity, vaginal    Spinal stenosis     Tachycardia     Tremor of right hand     Type 1 diabetes     Urinary tract infection, recurrent

## (undated) DEVICE — SYRINGE MED 10ML LUERLOCK TIP W/O SFTY DISP

## (undated) DEVICE — NEEDLE HYPO 25GA L1.5IN BLU POLYPR HUB S STL REG BVL STR

## (undated) DEVICE — PAD, DEFIB, ADULT, RADIOTRAN, PHYSIO, LO: Brand: MEDLINE

## (undated) DEVICE — BLADE,STAINLESS-STEEL,10,STRL,DISPOSABLE: Brand: MEDLINE

## (undated) DEVICE — SWAB CULT SGL AMIES W/O CHAR FOR THRT VAG SKIN HRT CULTSWAB

## (undated) DEVICE — PLASMABLADE PS210-030S 3.0S LOCK: Brand: PLASMABLADE™

## (undated) DEVICE — YANKAUER,BULB TIP,W/O VENT,RIGID,STERILE: Brand: MEDLINE

## (undated) DEVICE — DRESSING GZ W1XL8IN COT XRFRM N ADH OVERWRAP CURAD

## (undated) DEVICE — TUBING, SUCTION, 1/4" X 10', STRAIGHT: Brand: MEDLINE

## (undated) DEVICE — PADDING,UNDERCAST,COTTON, 4"X4YD STERILE: Brand: MEDLINE

## (undated) DEVICE — TOWEL,OR,DSP,ST,BLUE,STD,6/PK,12PK/CS: Brand: MEDLINE

## (undated) DEVICE — ELECTRODE PT RET AD L9FT HI MOIST COND ADH HYDRGEL CORDED

## (undated) DEVICE — GOWN,SIRUS,NONRNF,SETINSLV,XL,20/CS: Brand: MEDLINE

## (undated) DEVICE — BANDAGE,GAUZE,4.5"X4.1YD,STERILE,LF: Brand: MEDLINE

## (undated) DEVICE — PACK,EXTREMITY I: Brand: MEDLINE

## (undated) DEVICE — BASIC DOUBLE BASIN 2-LF: Brand: MEDLINE INDUSTRIES, INC.

## (undated) DEVICE — PREMIUM WET SKIN PREP TRAY: Brand: MEDLINE INDUSTRIES, INC.

## (undated) DEVICE — DRESSING NEG PRSS L W15XH3.3XL26CM BLK POLYUR DRP PD

## (undated) DEVICE — SPECIMEN CUP W/LID: Brand: DEROYAL

## (undated) DEVICE — DRESSING WND VAC XLG GRANUFOAM SENSATRAC

## (undated) DEVICE — SYRINGE MED 10ML TRNSLUC BRL PLUNG BLK MRK POLYPR CTRL

## (undated) DEVICE — BANDAGE COMPR M W4INXL10YD WHT BGE VELC E MTRX HK AND LOOP

## (undated) DEVICE — MARKER,SKIN,WI/RULER AND LABELS: Brand: MEDLINE

## (undated) DEVICE — COVER,LIGHT HANDLE,FLX,1/PK: Brand: MEDLINE INDUSTRIES, INC.

## (undated) DEVICE — GARMENT,MEDLINE,DVT,INT,CALF,MED, GEN2: Brand: MEDLINE

## (undated) DEVICE — GLOVE ORTHO 7   MSG9470

## (undated) DEVICE — NDL CNTR 40CT FM MAG: Brand: MEDLINE INDUSTRIES, INC.

## (undated) DEVICE — NEEDLE FLTR 18GA L1.5IN MEM THK5UM BLNT DISP

## (undated) DEVICE — ZIMMER® STERILE DISPOSABLE TOURNIQUET CUFF WITH PROTECTIVE SLEEVE AND PLC, DUAL PORT, SINGLE BLADDER, 18 IN. (46 CM)

## (undated) DEVICE — SYSTEM CANSTR 1000ML VAC DISP

## (undated) DEVICE — RENTAL WOUND VAC  ATS DAILY

## (undated) DEVICE — GAUZE,SPONGE,4"X4",16PLY,STRL,LF,10/TRAY: Brand: MEDLINE

## (undated) DEVICE — WIPES SKIN CLOTH READYPREP 9 X 10.5 IN 2% CHLORHEX GLUCONATE CHG PREOP

## (undated) DEVICE — SPONGE LAP W18XL18IN WHT COT 4 PLY FLD STRUNG RADPQ DISP ST 2 PER PACK

## (undated) DEVICE — SYRINGE IRRIG 60ML SFT PLIABLE BLB EZ TO GRP 1 HND USE W/

## (undated) DEVICE — 4-PORT MANIFOLD: Brand: NEPTUNE 2